# Patient Record
Sex: FEMALE | Race: WHITE | HISPANIC OR LATINO | Employment: FULL TIME | URBAN - METROPOLITAN AREA
[De-identification: names, ages, dates, MRNs, and addresses within clinical notes are randomized per-mention and may not be internally consistent; named-entity substitution may affect disease eponyms.]

---

## 2019-06-07 ENCOUNTER — HOSPITAL ENCOUNTER (INPATIENT)
Facility: HOSPITAL | Age: 24
LOS: 4 days | Discharge: HOME | End: 2019-06-12
Attending: EMERGENCY MEDICINE | Admitting: INTERNAL MEDICINE

## 2019-06-07 DIAGNOSIS — J45.902 SEVERE ASTHMA WITH STATUS ASTHMATICUS, UNSPECIFIED WHETHER PERSISTENT: Primary | ICD-10-CM

## 2019-06-07 DIAGNOSIS — J96.01 ACUTE RESPIRATORY FAILURE WITH HYPOXIA (HCC): ICD-10-CM

## 2019-06-07 DIAGNOSIS — R09.02 HYPOXIA: ICD-10-CM

## 2019-06-07 PROCEDURE — 99285 EMERGENCY DEPT VISIT HI MDM: CPT

## 2019-06-07 RX ORDER — SODIUM CHLORIDE 0.9 % (FLUSH) 0.9 %
10 SYRINGE (ML) INJECTION AS NEEDED
Status: DISCONTINUED | OUTPATIENT
Start: 2019-06-07 | End: 2019-06-12 | Stop reason: HOSPADM

## 2019-06-08 ENCOUNTER — APPOINTMENT (OUTPATIENT)
Dept: GENERAL RADIOLOGY | Facility: HOSPITAL | Age: 24
End: 2019-06-08

## 2019-06-08 PROBLEM — J45.902 SEVERE ASTHMA WITH STATUS ASTHMATICUS: Status: ACTIVE | Noted: 2019-06-08

## 2019-06-08 PROBLEM — Z91.09 ENVIRONMENTAL ALLERGIES: Status: ACTIVE | Noted: 2019-06-08

## 2019-06-08 PROBLEM — J96.01 ACUTE RESPIRATORY FAILURE WITH HYPOXIA: Status: ACTIVE | Noted: 2019-06-08

## 2019-06-08 PROBLEM — J45.901 ASTHMA EXACERBATION: Status: ACTIVE | Noted: 2019-06-08

## 2019-06-08 LAB
ALBUMIN SERPL-MCNC: 4.4 G/DL (ref 3.5–5.2)
ALBUMIN/GLOB SERPL: 1.5 G/DL
ALP SERPL-CCNC: 59 U/L (ref 39–117)
ALT SERPL W P-5'-P-CCNC: 35 U/L (ref 1–33)
ANION GAP SERPL CALCULATED.3IONS-SCNC: 13 MMOL/L
ANION GAP SERPL CALCULATED.3IONS-SCNC: 14 MMOL/L
ARTERIAL PATENCY WRIST A: POSITIVE
AST SERPL-CCNC: 35 U/L (ref 1–32)
ATMOSPHERIC PRESS: ABNORMAL MMHG
BASE EXCESS BLDA CALC-SCNC: -0.2 MMOL/L (ref 0–2)
BASOPHILS # BLD AUTO: 0.09 10*3/MM3 (ref 0–0.2)
BASOPHILS NFR BLD AUTO: 1.1 % (ref 0–1.5)
BDY SITE: ABNORMAL
BILIRUB SERPL-MCNC: 0.7 MG/DL (ref 0.2–1.2)
BODY TEMPERATURE: 37 C
BUN BLD-MCNC: 6 MG/DL (ref 6–20)
BUN BLD-MCNC: 8 MG/DL (ref 6–20)
BUN/CREAT SERPL: 10.8 (ref 7–25)
BUN/CREAT SERPL: 9.1 (ref 7–25)
CALCIUM SPEC-SCNC: 8.7 MG/DL (ref 8.6–10.5)
CALCIUM SPEC-SCNC: 9.2 MG/DL (ref 8.6–10.5)
CHLORIDE SERPL-SCNC: 104 MMOL/L (ref 98–107)
CHLORIDE SERPL-SCNC: 104 MMOL/L (ref 98–107)
CO2 BLDA-SCNC: 23.7 MMOL/L (ref 23–27)
CO2 SERPL-SCNC: 22 MMOL/L (ref 22–29)
CO2 SERPL-SCNC: 24 MMOL/L (ref 22–29)
COHGB MFR BLD: 1.2 % (ref 0–2)
CREAT BLD-MCNC: 0.66 MG/DL (ref 0.57–1)
CREAT BLD-MCNC: 0.74 MG/DL (ref 0.57–1)
D DIMER PPP FEU-MCNC: 0.55 MCGFEU/ML (ref 0–0.56)
D-LACTATE SERPL-SCNC: 1.1 MMOL/L (ref 0.5–2)
DEPRECATED RDW RBC AUTO: 39.4 FL (ref 37–54)
DEPRECATED RDW RBC AUTO: 40.8 FL (ref 37–54)
EOSINOPHIL # BLD AUTO: 0.29 10*3/MM3 (ref 0–0.4)
EOSINOPHIL NFR BLD AUTO: 3.6 % (ref 0.3–6.2)
ERYTHROCYTE [DISTWIDTH] IN BLOOD BY AUTOMATED COUNT: 15.1 % (ref 12.3–15.4)
ERYTHROCYTE [DISTWIDTH] IN BLOOD BY AUTOMATED COUNT: 15.3 % (ref 12.3–15.4)
GFR SERPL CREATININE-BSD FRML MDRD: 111 ML/MIN/1.73
GFR SERPL CREATININE-BSD FRML MDRD: 97 ML/MIN/1.73
GLOBULIN UR ELPH-MCNC: 3 GM/DL
GLUCOSE BLD-MCNC: 208 MG/DL (ref 65–99)
GLUCOSE BLD-MCNC: 94 MG/DL (ref 65–99)
HCO3 BLDA-SCNC: 22.8 MMOL/L (ref 20–26)
HCT VFR BLD AUTO: 45.2 % (ref 34–46.6)
HCT VFR BLD AUTO: 46.8 % (ref 34–46.6)
HCT VFR BLD CALC: 43.1 %
HGB BLD-MCNC: 13.6 G/DL (ref 12–15.9)
HGB BLD-MCNC: 14.2 G/DL (ref 12–15.9)
HGB BLDA-MCNC: 14.1 G/DL (ref 14–18)
HOLD SPECIMEN: NORMAL
HOLD SPECIMEN: NORMAL
HOROWITZ INDEX BLD+IHG-RTO: 28 %
IMM GRANULOCYTES # BLD AUTO: 0.03 10*3/MM3 (ref 0–0.05)
IMM GRANULOCYTES NFR BLD AUTO: 0.4 % (ref 0–0.5)
LYMPHOCYTES # BLD AUTO: 1.62 10*3/MM3 (ref 0.7–3.1)
LYMPHOCYTES NFR BLD AUTO: 20 % (ref 19.6–45.3)
MCH RBC QN AUTO: 22.7 PG (ref 26.6–33)
MCH RBC QN AUTO: 22.8 PG (ref 26.6–33)
MCHC RBC AUTO-ENTMCNC: 30.1 G/DL (ref 31.5–35.7)
MCHC RBC AUTO-ENTMCNC: 30.3 G/DL (ref 31.5–35.7)
MCV RBC AUTO: 75 FL (ref 79–97)
MCV RBC AUTO: 75.5 FL (ref 79–97)
METHGB BLD QL: 0.8 % (ref 0–1.5)
MODALITY: ABNORMAL
MONOCYTES # BLD AUTO: 0.91 10*3/MM3 (ref 0.1–0.9)
MONOCYTES NFR BLD AUTO: 11.2 % (ref 5–12)
NEUTROPHILS # BLD AUTO: 5.17 10*3/MM3 (ref 1.7–7)
NEUTROPHILS NFR BLD AUTO: 63.7 % (ref 42.7–76)
NOTE: ABNORMAL
NRBC BLD AUTO-RTO: 0 /100 WBC (ref 0–0.2)
OXYHGB MFR BLDV: 94.4 % (ref 94–99)
PCO2 BLDA: 31.6 MM HG (ref 35–45)
PCO2 TEMP ADJ BLD: 31.6 MM HG (ref 35–45)
PH BLDA: 7.46 PH UNITS (ref 7.35–7.45)
PH, TEMP CORRECTED: 7.46 PH UNITS
PLATELET # BLD AUTO: 311 10*3/MM3 (ref 140–450)
PLATELET # BLD AUTO: 316 10*3/MM3 (ref 140–450)
PMV BLD AUTO: 11.2 FL (ref 6–12)
PMV BLD AUTO: 11.3 FL (ref 6–12)
PO2 BLDA: 70.7 MM HG (ref 83–108)
PO2 TEMP ADJ BLD: 70.7 MM HG (ref 83–108)
POTASSIUM BLD-SCNC: 3.8 MMOL/L (ref 3.5–5.2)
POTASSIUM BLD-SCNC: 4.5 MMOL/L (ref 3.5–5.2)
PROT SERPL-MCNC: 7.4 G/DL (ref 6–8.5)
RBC # BLD AUTO: 5.99 10*6/MM3 (ref 3.77–5.28)
RBC # BLD AUTO: 6.24 10*6/MM3 (ref 3.77–5.28)
SODIUM BLD-SCNC: 140 MMOL/L (ref 136–145)
SODIUM BLD-SCNC: 141 MMOL/L (ref 136–145)
VENTILATOR MODE: ABNORMAL
WBC NRBC COR # BLD: 6.72 10*3/MM3 (ref 3.4–10.8)
WBC NRBC COR # BLD: 8.11 10*3/MM3 (ref 3.4–10.8)
WHOLE BLOOD HOLD SPECIMEN: NORMAL
WHOLE BLOOD HOLD SPECIMEN: NORMAL

## 2019-06-08 PROCEDURE — 87150 DNA/RNA AMPLIFIED PROBE: CPT | Performed by: EMERGENCY MEDICINE

## 2019-06-08 PROCEDURE — 87147 CULTURE TYPE IMMUNOLOGIC: CPT | Performed by: EMERGENCY MEDICINE

## 2019-06-08 PROCEDURE — 94799 UNLISTED PULMONARY SVC/PX: CPT

## 2019-06-08 PROCEDURE — 25010000002 METHYLPREDNISOLONE PER 125 MG: Performed by: EMERGENCY MEDICINE

## 2019-06-08 PROCEDURE — 25010000002 CEFTRIAXONE PER 250 MG: Performed by: EMERGENCY MEDICINE

## 2019-06-08 PROCEDURE — 85025 COMPLETE CBC W/AUTO DIFF WBC: CPT | Performed by: EMERGENCY MEDICINE

## 2019-06-08 PROCEDURE — 36600 WITHDRAWAL OF ARTERIAL BLOOD: CPT

## 2019-06-08 PROCEDURE — 82805 BLOOD GASES W/O2 SATURATION: CPT

## 2019-06-08 PROCEDURE — 93005 ELECTROCARDIOGRAM TRACING: CPT | Performed by: EMERGENCY MEDICINE

## 2019-06-08 PROCEDURE — 85379 FIBRIN DEGRADATION QUANT: CPT | Performed by: EMERGENCY MEDICINE

## 2019-06-08 PROCEDURE — 25010000002 HEPARIN (PORCINE) PER 1000 UNITS: Performed by: PHYSICIAN ASSISTANT

## 2019-06-08 PROCEDURE — 87040 BLOOD CULTURE FOR BACTERIA: CPT | Performed by: EMERGENCY MEDICINE

## 2019-06-08 PROCEDURE — 25010000002 METHYLPREDNISOLONE PER 125 MG: Performed by: PHYSICIAN ASSISTANT

## 2019-06-08 PROCEDURE — 83605 ASSAY OF LACTIC ACID: CPT | Performed by: EMERGENCY MEDICINE

## 2019-06-08 PROCEDURE — 85027 COMPLETE CBC AUTOMATED: CPT | Performed by: PHYSICIAN ASSISTANT

## 2019-06-08 PROCEDURE — 94640 AIRWAY INHALATION TREATMENT: CPT

## 2019-06-08 PROCEDURE — 99223 1ST HOSP IP/OBS HIGH 75: CPT | Performed by: INTERNAL MEDICINE

## 2019-06-08 PROCEDURE — 80053 COMPREHEN METABOLIC PANEL: CPT | Performed by: EMERGENCY MEDICINE

## 2019-06-08 PROCEDURE — 25010000002 ONDANSETRON PER 1 MG

## 2019-06-08 PROCEDURE — 71045 X-RAY EXAM CHEST 1 VIEW: CPT

## 2019-06-08 PROCEDURE — 25010000002 AZITHROMYCIN PER 500 MG: Performed by: EMERGENCY MEDICINE

## 2019-06-08 RX ORDER — MONTELUKAST SODIUM 10 MG/1
10 TABLET ORAL NIGHTLY
COMMUNITY
End: 2021-05-24 | Stop reason: SDUPTHER

## 2019-06-08 RX ORDER — HEPARIN SODIUM 5000 [USP'U]/ML
5000 INJECTION, SOLUTION INTRAVENOUS; SUBCUTANEOUS EVERY 8 HOURS SCHEDULED
Status: DISCONTINUED | OUTPATIENT
Start: 2019-06-08 | End: 2019-06-12 | Stop reason: HOSPADM

## 2019-06-08 RX ORDER — METHYLPREDNISOLONE SODIUM SUCCINATE 125 MG/2ML
125 INJECTION, POWDER, LYOPHILIZED, FOR SOLUTION INTRAMUSCULAR; INTRAVENOUS ONCE
Status: COMPLETED | OUTPATIENT
Start: 2019-06-08 | End: 2019-06-08

## 2019-06-08 RX ORDER — ONDANSETRON 2 MG/ML
INJECTION INTRAMUSCULAR; INTRAVENOUS
Status: COMPLETED
Start: 2019-06-08 | End: 2019-06-08

## 2019-06-08 RX ORDER — METHYLPREDNISOLONE SODIUM SUCCINATE 125 MG/2ML
60 INJECTION, POWDER, LYOPHILIZED, FOR SOLUTION INTRAMUSCULAR; INTRAVENOUS EVERY 8 HOURS
Status: DISCONTINUED | OUTPATIENT
Start: 2019-06-08 | End: 2019-06-10

## 2019-06-08 RX ORDER — IPRATROPIUM BROMIDE AND ALBUTEROL SULFATE 2.5; .5 MG/3ML; MG/3ML
3 SOLUTION RESPIRATORY (INHALATION)
Status: DISCONTINUED | OUTPATIENT
Start: 2019-06-08 | End: 2019-06-12 | Stop reason: HOSPADM

## 2019-06-08 RX ORDER — IPRATROPIUM BROMIDE AND ALBUTEROL SULFATE 2.5; .5 MG/3ML; MG/3ML
3 SOLUTION RESPIRATORY (INHALATION) ONCE
Status: COMPLETED | OUTPATIENT
Start: 2019-06-08 | End: 2019-06-08

## 2019-06-08 RX ORDER — CEFTRIAXONE SODIUM 1 G/50ML
1 INJECTION, SOLUTION INTRAVENOUS ONCE
Status: DISCONTINUED | OUTPATIENT
Start: 2019-06-08 | End: 2019-06-08 | Stop reason: SDUPTHER

## 2019-06-08 RX ORDER — ALBUTEROL SULFATE 2.5 MG/3ML
2.5 SOLUTION RESPIRATORY (INHALATION) EVERY 4 HOURS PRN
COMMUNITY
End: 2019-06-12 | Stop reason: HOSPADM

## 2019-06-08 RX ORDER — CHOLECALCIFEROL (VITAMIN D3) 125 MCG
5 CAPSULE ORAL NIGHTLY PRN
Status: DISCONTINUED | OUTPATIENT
Start: 2019-06-08 | End: 2019-06-12 | Stop reason: HOSPADM

## 2019-06-08 RX ORDER — ONDANSETRON 2 MG/ML
4 INJECTION INTRAMUSCULAR; INTRAVENOUS ONCE
Status: COMPLETED | OUTPATIENT
Start: 2019-06-08 | End: 2019-06-08

## 2019-06-08 RX ORDER — SODIUM CHLORIDE 0.9 % (FLUSH) 0.9 %
3-10 SYRINGE (ML) INJECTION AS NEEDED
Status: DISCONTINUED | OUTPATIENT
Start: 2019-06-08 | End: 2019-06-12 | Stop reason: HOSPADM

## 2019-06-08 RX ORDER — BUDESONIDE AND FORMOTEROL FUMARATE DIHYDRATE 160; 4.5 UG/1; UG/1
2 AEROSOL RESPIRATORY (INHALATION)
Status: DISCONTINUED | OUTPATIENT
Start: 2019-06-08 | End: 2019-06-12 | Stop reason: HOSPADM

## 2019-06-08 RX ORDER — BENZONATATE 100 MG/1
200 CAPSULE ORAL 3 TIMES DAILY PRN
Status: DISCONTINUED | OUTPATIENT
Start: 2019-06-08 | End: 2019-06-12 | Stop reason: HOSPADM

## 2019-06-08 RX ORDER — ACETAMINOPHEN 325 MG/1
650 TABLET ORAL EVERY 4 HOURS PRN
Status: DISCONTINUED | OUTPATIENT
Start: 2019-06-08 | End: 2019-06-12 | Stop reason: HOSPADM

## 2019-06-08 RX ORDER — SODIUM CHLORIDE 0.9 % (FLUSH) 0.9 %
3 SYRINGE (ML) INJECTION EVERY 12 HOURS SCHEDULED
Status: DISCONTINUED | OUTPATIENT
Start: 2019-06-08 | End: 2019-06-12 | Stop reason: HOSPADM

## 2019-06-08 RX ORDER — BENZONATATE 200 MG/1
200 CAPSULE ORAL 3 TIMES DAILY PRN
COMMUNITY
End: 2019-06-18

## 2019-06-08 RX ORDER — MONTELUKAST SODIUM 10 MG/1
10 TABLET ORAL NIGHTLY
Status: DISCONTINUED | OUTPATIENT
Start: 2019-06-08 | End: 2019-06-12 | Stop reason: HOSPADM

## 2019-06-08 RX ADMIN — METHYLPREDNISOLONE SODIUM SUCCINATE 125 MG: 125 INJECTION, POWDER, FOR SOLUTION INTRAMUSCULAR; INTRAVENOUS at 00:09

## 2019-06-08 RX ADMIN — BUDESONIDE AND FORMOTEROL FUMARATE DIHYDRATE 2 PUFF: 160; 4.5 AEROSOL RESPIRATORY (INHALATION) at 07:36

## 2019-06-08 RX ADMIN — SODIUM CHLORIDE, PRESERVATIVE FREE 3 ML: 5 INJECTION INTRAVENOUS at 19:34

## 2019-06-08 RX ADMIN — MONTELUKAST SODIUM 10 MG: 10 TABLET, COATED ORAL at 19:33

## 2019-06-08 RX ADMIN — CEFTRIAXONE SODIUM 1 G: 1 INJECTION, SOLUTION INTRAVENOUS at 03:10

## 2019-06-08 RX ADMIN — IPRATROPIUM BROMIDE AND ALBUTEROL SULFATE 3 ML: 2.5; .5 SOLUTION RESPIRATORY (INHALATION) at 01:57

## 2019-06-08 RX ADMIN — IPRATROPIUM BROMIDE AND ALBUTEROL SULFATE 3 ML: 2.5; .5 SOLUTION RESPIRATORY (INHALATION) at 07:05

## 2019-06-08 RX ADMIN — ONDANSETRON 4 MG: 2 INJECTION INTRAMUSCULAR; INTRAVENOUS at 02:28

## 2019-06-08 RX ADMIN — IPRATROPIUM BROMIDE AND ALBUTEROL SULFATE 3 ML: 2.5; .5 SOLUTION RESPIRATORY (INHALATION) at 00:25

## 2019-06-08 RX ADMIN — AZITHROMYCIN MONOHYDRATE 500 MG: 500 INJECTION, POWDER, LYOPHILIZED, FOR SOLUTION INTRAVENOUS at 02:09

## 2019-06-08 RX ADMIN — IPRATROPIUM BROMIDE AND ALBUTEROL SULFATE 3 ML: 2.5; .5 SOLUTION RESPIRATORY (INHALATION) at 19:05

## 2019-06-08 RX ADMIN — BUDESONIDE AND FORMOTEROL FUMARATE DIHYDRATE 2 PUFF: 160; 4.5 AEROSOL RESPIRATORY (INHALATION) at 19:05

## 2019-06-08 RX ADMIN — ACETAMINOPHEN 650 MG: 325 TABLET ORAL at 19:33

## 2019-06-08 RX ADMIN — METHYLPREDNISOLONE SODIUM SUCCINATE 60 MG: 125 INJECTION, POWDER, FOR SOLUTION INTRAMUSCULAR; INTRAVENOUS at 16:15

## 2019-06-08 RX ADMIN — METHYLPREDNISOLONE SODIUM SUCCINATE 60 MG: 125 INJECTION, POWDER, FOR SOLUTION INTRAMUSCULAR; INTRAVENOUS at 08:10

## 2019-06-08 RX ADMIN — BENZONATATE 200 MG: 100 CAPSULE ORAL at 05:06

## 2019-06-08 RX ADMIN — HEPARIN SODIUM 5000 UNITS: 5000 INJECTION INTRAVENOUS; SUBCUTANEOUS at 14:49

## 2019-06-08 RX ADMIN — IPRATROPIUM BROMIDE AND ALBUTEROL SULFATE 3 ML: 2.5; .5 SOLUTION RESPIRATORY (INHALATION) at 13:48

## 2019-06-08 RX ADMIN — ACETAMINOPHEN 650 MG: 325 TABLET ORAL at 08:10

## 2019-06-08 RX ADMIN — HEPARIN SODIUM 5000 UNITS: 5000 INJECTION INTRAVENOUS; SUBCUTANEOUS at 21:34

## 2019-06-08 RX ADMIN — HEPARIN SODIUM 5000 UNITS: 5000 INJECTION INTRAVENOUS; SUBCUTANEOUS at 05:07

## 2019-06-08 NOTE — PROGRESS NOTES
Patient seen and examined at the bedside.  Patient resting in bed in no acute distress but still has very vigorous cough and has pleuritic chest pain because of the cough.  Overall she feels better compared to when she came in.  No fever or chills.  No nausea, vomiting, diarrhea, abdominal pain.  -We will continue neb treatment, oxygen treatment, steroids  -Medicine will follow.

## 2019-06-08 NOTE — H&P
CARIE/ Medicine History and Physical    Primary Care Physician: Provider, No Known    CHIEF COMPLAIN :    Dyspnea.    History of Present Illness  24-year-old obese female presented to ED with a chief complaint of dyspnea/cough-going on for past 3 weeks.  Approximately 2 weeks back patient went to [facility]- was treated for asthma exacerbation with steroids/antibiotics- in spite of that she continued to have symptoms, 2 days back she went to [facility]-better she tested positive for rhinovirus/parainfluenza virus, as per patient CT PE was done-which was negative.  Again she was diagnosed with asthma exacerbation, was treated symptomatically and sent home.  In spite of continued use of her nebulizer, cough syrup, Dulera- she had worsening of dyspnea now at rest, associated with cough/clear sputum and chest pain whenever she tries to take deep breath sharp in nature.  No fever, but felt warm.  Did complain of nausea but no vomiting.  Patient does work in the hospital as .  States over last 3 years has poorly controlled asthma-last admission was in 2018 at  for 2 weeks-never intubated.  Asthma has been managed by primary care.    Review of Systems   Complete ROS done, which is negative except as above and HPI.  Old records reviewed and summarized in PM hx         Past Medical History:   Diagnosis Date   • Asthma-since childhood        Past Surgical History:   Procedure Laterality Date   • CHOLECYSTECTOMY         Family History:   Reviewed with patient-no family history of CAD.    Social History:    reports that she has never smoked. She has never used smokeless tobacco. She reports that she does not drink alcohol or use drugs.    Medications:    (Not in a hospital admission)  No Known Allergies      PHYSICAL EXAM :    Vitals:    06/08/19 0200   BP: 127/74   Pulse: 96   Resp:  22   Temp:  Afebrile   SpO2: 98% on 2 L nasal cannula.     VITALS-   AS ABOVE.  GENERAL- Not distressed, well nourished,  obese.  RS-decreased air entry at the bases, could not take a deep breath will start coughing.  CVS- s1s2 Regular, tachycardic, no murmur.  ABD- soft, non tender, distended, no organomegaly. BS good.  EXT- no edema. Pulses + .  NEURO- AAO-3, power 5/5 in all ext, no gross sensory deficit, cranial nerves intact.  EYES- Conjunctivae are normal. Pupils are equal, round, and reactive to light. No scleral icterus.   ENT- no external ear nose lesions, mucosa moist.  NECK-  No tracheal deviation present. No thyromegaly present,No cervical lymphadenopathy.  JOINTS/MSK- no deformity, no swelling.  SKIN- no rash , warm to touch.  PSYCHIATRIC-  has a normal mood and affect.Thought content normal.           RESULTS REVIEWED :   Data.    Results from last 7 days   Lab Units 06/08/19  0005   WBC 10*3/mm3 8.11   HEMOGLOBIN g/dL 14.2   HEMATOCRIT % 46.8*   PLATELETS 10*3/mm3 316     Results from last 7 days   Lab Units 06/08/19  0006   SODIUM mmol/L 140   POTASSIUM mmol/L 3.8   CHLORIDE mmol/L 104   CO2 mmol/L 22.0   BUN mg/dL 6   CREATININE mg/dL 0.66   GLUCOSE mg/dL 94   CALCIUM mg/dL 9.2   ALT (SGPT) U/L 35*   AST (SGOT) U/L 35*       Brief Urine Lab Results     None          pH, Arterial   Date Value Ref Range Status   06/08/2019 7.465 (H) 7.350 - 7.450 pH units Final     Comment:     83 Value above reference range     ABG-7.46/PCO2 of 31/PO2 of 70% on 2 L FiO2.  Ekg-sinus rhythm at 106 bpm, QTC of 456, Q waves in lead III with T wave inversion.  Cx-?  Obscuring of right heart border atelectasis versus pneumonia  WBC- 8, hemoglobin of 14, neutrophils of 63, platelet of 316.  D-dimer of 0.55, lactic acid of 1.1, BUN/creatinine 6/0.6.  I have personally reviewed current lab, radiology, and ekg .      Active Hospital Problems    Diagnosis POA   • **Acute respiratory failure with hypoxia (CMS/HCC) [J96.01] Yes   • Asthma exacerbation [J45.901] Yes   • Environmental allergies [Z91.09] Yes         Assessment / Plan     Asthma  exacerbation with hypoxic respiratory failure  -Continued albuterol nebulizer every 6 hourly, IV Solu-Medrol, for now we will continue Rocephin/Zithromax probably can taper the antibiotics soon.  - Peak expiratory flow measurements.  -May need pulmonary outpatient appointment upon discharge.  -We will get records from recent [facility] work-up.    DVT PXL  -michael's/scds  -lovenox-not initiated.      I discussed the patients findings and my recommendations with: patient/family.    I believe this patient meets INPATIENT status due to the need for care which can only be reasonably provided in an hospital setting such as aggressive/expedited ancillary services and/or consultation services, the necessity for IV medications, close physician monitoring and/or the possible need for procedures.  In such, I feel patient’s risk for adverse outcomes and need for care warrant INPATIENT evaluation and predict the patient’s care encounter to likely last beyond 2 midnights.        Denzel Riddle MD  06/08/19  2:08 AM

## 2019-06-08 NOTE — ED PROVIDER NOTES
Subjective   24-year-old female presents for evaluation of asthma exacerbation.  Of note, the patient has a long history of asthma and states that she has had to be hospitalized multiple times in the past.  She has never been intubated.  She states that she has not felt well for the past 3 weeks.  Over that span, she has been to both urgent care and an outside emergency department for her symptoms.  She has completed 2 outpatient courses of oral steroids.  She was diagnosed with rhinovirus by PCR last week.  However, she has continued to be symptomatic and states that over the past 24 hours she has had significantly increased dyspnea when compared to baseline despite using her nebs as directed.  She endorses a nonproductive cough as well as persistent wheezing.  She is unsure as to what may have triggered her symptoms.  No sick contacts.        History provided by:  Patient  Shortness of Breath   Severity:  Severe  Onset quality:  Gradual  Duration:  1 day  Timing:  Constant  Progression:  Worsening  Chronicity:  Recurrent  Relieved by:  Nothing  Worsened by:  Nothing  Ineffective treatments: Steroids.  Associated symptoms: cough and wheezing    Associated symptoms: no fever        Review of Systems   Constitutional: Negative for fever.   Respiratory: Positive for cough, shortness of breath and wheezing.    All other systems reviewed and are negative.      No past medical history on file.    No Known Allergies    No past surgical history on file.    No family history on file.    Social History     Socioeconomic History   • Marital status: Single     Spouse name: Not on file   • Number of children: Not on file   • Years of education: Not on file   • Highest education level: Not on file         Objective   Physical Exam   Constitutional: She is oriented to person, place, and time. She appears well-developed and well-nourished. No distress.   Nontoxic-appearing female, labored breathing   HENT:   Head: Normocephalic  and atraumatic.   Mouth/Throat: Oropharynx is clear and moist.   Neck: Neck supple. No JVD present.   Cardiovascular: Regular rhythm and normal heart sounds. Exam reveals no gallop and no friction rub.   No murmur heard.  Tachycardic   Pulmonary/Chest: She is in respiratory distress. She has wheezes. She has no rales.   Labored breathing, poor air movement noted bilaterally, expiratory wheezing noted bilaterally, speaking in short sentences, no retractions   Abdominal: Soft. Bowel sounds are normal. She exhibits no distension and no mass. There is no tenderness. There is no rebound and no guarding.   Musculoskeletal: Normal range of motion.   Neurological: She is alert and oriented to person, place, and time.   Skin: Skin is warm and dry. No rash noted. She is not diaphoretic. No erythema.   Psychiatric: She has a normal mood and affect. Judgment and thought content normal.   Nursing note and vitals reviewed.      Critical Care  Performed by: Jose G Roberson MD  Authorized by: Jose G Roberson MD     Critical care provider statement:     Critical care time (minutes):  35    Critical care was necessary to treat or prevent imminent or life-threatening deterioration of the following conditions:  Respiratory failure    Critical care was time spent personally by me on the following activities:  Development of treatment plan with patient or surrogate, evaluation of patient's response to treatment, examination of patient, obtaining history from patient or surrogate, ordering and performing treatments and interventions, ordering and review of laboratory studies, ordering and review of radiographic studies, pulse oximetry and re-evaluation of patient's condition               ED Course  ED Course as of Jun 08 0224   Sat Jun 08, 2019   0128 D-Dimer, Quant: 0.55 [DD]   0222 24-year-old female presents for evaluation of asthma exacerbation.  Of note, the patient states that she has been hospitalized for asthma multiple  "times in the past but has never been intubated.  On arrival to the ED, patient with labored breathing and room air oxygen saturations of 90%.  Supplemental oxygen given.  Nebs and steroids given.  Exam remarkable for poor air movement bilaterally, audible wheezing, and moderate respiratory distress.  Chest x-ray somewhat equivocal but suggestive of potential pneumonia which does fit the patient's clinical picture.  Rocephin and azithromycin given.  Blood cultures pending.  Low risk Wells and d-dimer negative.  Following multiple nebs, patient with persistently labored breathing and oxygen saturations in the upper 80s/low 90s.  At this point, I feel that she warrants admission to the hospital for further evaluation and treatment.  I discussed her case with Dr. Riddle, and she will be admitted under her care.    [DD]      ED Course User Index  [DD] Jose G Roberson MD      No results found for this or any previous visit (from the past 24 hour(s)).  Note: In addition to lab results from this visit, the labs listed above may include labs taken at another facility or during a different encounter within the last 24 hours. Please correlate lab times with ED admission and discharge times for further clarification of the services performed during this visit.    XR Chest 1 View    (Results Pending)     Vitals:    06/07/19 2349   BP: 135/71   BP Location: Left arm   Patient Position: Sitting   Pulse: 120   Resp: 26   Temp: 98.8 °F (37.1 °C)   TempSrc: Oral   SpO2: 90%   Weight: 111 kg (245 lb)   Height: 152.4 cm (60\")     Medications   sodium chloride 0.9 % flush 10 mL (not administered)   ipratropium-albuterol (DUO-NEB) nebulizer solution 3 mL (not administered)   methylPREDNISolone sodium succinate (SOLU-Medrol) injection 125 mg (not administered)     ECG/EMG Results (last 24 hours)     ** No results found for the last 24 hours. **        No orders to display                     Recent Results (from the past 24 hour(s)) "   Lactic Acid, Plasma    Collection Time: 06/08/19 12:05 AM   Result Value Ref Range    Lactate 1.1 0.5 - 2.0 mmol/L   CBC Auto Differential    Collection Time: 06/08/19 12:05 AM   Result Value Ref Range    WBC 8.11 3.40 - 10.80 10*3/mm3    RBC 6.24 (H) 3.77 - 5.28 10*6/mm3    Hemoglobin 14.2 12.0 - 15.9 g/dL    Hematocrit 46.8 (H) 34.0 - 46.6 %    MCV 75.0 (L) 79.0 - 97.0 fL    MCH 22.8 (L) 26.6 - 33.0 pg    MCHC 30.3 (L) 31.5 - 35.7 g/dL    RDW 15.1 12.3 - 15.4 %    RDW-SD 39.4 37.0 - 54.0 fl    MPV 11.2 6.0 - 12.0 fL    Platelets 316 140 - 450 10*3/mm3    Neutrophil % 63.7 42.7 - 76.0 %    Lymphocyte % 20.0 19.6 - 45.3 %    Monocyte % 11.2 5.0 - 12.0 %    Eosinophil % 3.6 0.3 - 6.2 %    Basophil % 1.1 0.0 - 1.5 %    Immature Grans % 0.4 0.0 - 0.5 %    Neutrophils, Absolute 5.17 1.70 - 7.00 10*3/mm3    Lymphocytes, Absolute 1.62 0.70 - 3.10 10*3/mm3    Monocytes, Absolute 0.91 (H) 0.10 - 0.90 10*3/mm3    Eosinophils, Absolute 0.29 0.00 - 0.40 10*3/mm3    Basophils, Absolute 0.09 0.00 - 0.20 10*3/mm3    Immature Grans, Absolute 0.03 0.00 - 0.05 10*3/mm3    nRBC 0.0 0.0 - 0.2 /100 WBC   Lavender Top    Collection Time: 06/08/19 12:05 AM   Result Value Ref Range    Extra Tube hold for add-on    Comprehensive Metabolic Panel    Collection Time: 06/08/19 12:06 AM   Result Value Ref Range    Glucose 94 65 - 99 mg/dL    BUN 6 6 - 20 mg/dL    Creatinine 0.66 0.57 - 1.00 mg/dL    Sodium 140 136 - 145 mmol/L    Potassium 3.8 3.5 - 5.2 mmol/L    Chloride 104 98 - 107 mmol/L    CO2 22.0 22.0 - 29.0 mmol/L    Calcium 9.2 8.6 - 10.5 mg/dL    Total Protein 7.4 6.0 - 8.5 g/dL    Albumin 4.40 3.50 - 5.20 g/dL    ALT (SGPT) 35 (H) 1 - 33 U/L    AST (SGOT) 35 (H) 1 - 32 U/L    Alkaline Phosphatase 59 39 - 117 U/L    Total Bilirubin 0.7 0.2 - 1.2 mg/dL    eGFR Non African Amer 111 >60 mL/min/1.73    Globulin 3.0 gm/dL    A/G Ratio 1.5 g/dL    BUN/Creatinine Ratio 9.1 7.0 - 25.0    Anion Gap 14.0 mmol/L   Light Blue Top    Collection  Time: 06/08/19 12:06 AM   Result Value Ref Range    Extra Tube hold for add-on    Green Top (Gel)    Collection Time: 06/08/19 12:06 AM   Result Value Ref Range    Extra Tube Hold for add-ons.    Gold Top - SST    Collection Time: 06/08/19 12:06 AM   Result Value Ref Range    Extra Tube Hold for add-ons.    D-dimer, Quantitative    Collection Time: 06/08/19 12:06 AM   Result Value Ref Range    D-Dimer, Quantitative 0.55 0.00 - 0.56 MCGFEU/mL   Blood Gas, Arterial With Co-Ox    Collection Time: 06/08/19  1:56 AM   Result Value Ref Range    Site Right Radial     Ismael's Test Positive     pH, Arterial 7.465 (H) 7.350 - 7.450 pH units    pCO2, Arterial 31.6 (L) 35.0 - 45.0 mm Hg    pO2, Arterial 70.7 (L) 83.0 - 108.0 mm Hg    HCO3, Arterial 22.8 20.0 - 26.0 mmol/L    Base Excess, Arterial -0.2 (L) 0.0 - 2.0 mmol/L    Hemoglobin, Blood Gas 14.1 14 - 18 g/dL    Hematocrit, Blood Gas 43.1 %    Oxyhemoglobin 94.4 94 - 99 %    Methemoglobin 0.80 0.00 - 1.50 %    Carboxyhemoglobin 1.2 0 - 2 %    CO2 Content 23.7 23 - 27 mmol/L    Temperature 37.0 C    Barometric Pressure for Blood Gas  mmHg    Modality Nasal Cannula     FIO2 28 %    Ventilator Mode       Note      pH, Temp Corrected 7.465 pH Units    pCO2, Temperature Corrected 31.6 (L) 35 - 45 mm Hg    pO2, Temperature Corrected 70.7 (L) 83 - 108 mm Hg     Note: In addition to lab results from this visit, the labs listed above may include labs taken at another facility or during a different encounter within the last 24 hours. Please correlate lab times with ED admission and discharge times for further clarification of the services performed during this visit.    XR Chest 1 View   Final Result      1.  Opacity obscuring the right heart border most likely reflective of atelectasis in light of patient's clinical history though pneumonia not excluded.      Signer Name: Porfirio Maharaj MD    Signed: 6/8/2019 12:35 AM    Workstation Name: [id number]            Vitals:     06/08/19 0100 06/08/19 0130 06/08/19 0156 06/08/19 0200   BP: 131/82 132/58  127/74   Pulse: 110 98 88 96   Resp:       Temp:       TempSrc:       SpO2: 92% 94% 97% 98%   Weight:       Height:         Medications   sodium chloride 0.9 % flush 10 mL (not administered)   cefTRIAXone (ROCEPHIN) IVPB 1 g (not administered)   azithromycin 500 MG/250 ML 0.9% NS IVPB (MBP) (500 mg Intravenous New Bag 6/8/19 0209)   ipratropium-albuterol (DUO-NEB) nebulizer solution 3 mL (3 mL Nebulization Given 6/8/19 0025)   methylPREDNISolone sodium succinate (SOLU-Medrol) injection 125 mg (125 mg Intravenous Given 6/8/19 0009)   ipratropium-albuterol (DUO-NEB) nebulizer solution 3 mL (3 mL Nebulization Given 6/8/19 0157)     ECG/EMG Results (last 24 hours)     Procedure Component Value Units Date/Time    ECG 12 Lead [[id number]] Collected:  06/08/19 0036     Updated:  06/08/19 0034        ECG 12 Lead                 MDM    Final diagnoses:   Severe asthma with status asthmaticus, unspecified whether persistent   Hypoxia       Documentation assistance provided by rock Aguilar.  Information recorded by the scribe was done at my direction and has been verified and validated by me.     Edi Aguilar  06/08/19 0003       Jose G Roberson MD  06/08/19 0224

## 2019-06-08 NOTE — PLAN OF CARE
Problem: Patient Care Overview  Goal: Plan of Care Review  Outcome: Ongoing (interventions implemented as appropriate)   06/08/19 2996   Coping/Psychosocial   Plan of Care Reviewed With patient   Plan of Care Review   Progress improving   OTHER   Outcome Summary VSS, Abx dc'd, continuing to receive IV steroids.      Goal: Individualization and Mutuality  Outcome: Ongoing (interventions implemented as appropriate)    Goal: Discharge Needs Assessment  Outcome: Ongoing (interventions implemented as appropriate)    Goal: Interprofessional Rounds/Family Conf  Outcome: Ongoing (interventions implemented as appropriate)      Problem: ARDS (Acute Resp Distress Syndrome) (Adult)  Goal: Signs and Symptoms of Listed Potential Problems Will be Absent, Minimized or Managed (ARDS)  Outcome: Ongoing (interventions implemented as appropriate)

## 2019-06-08 NOTE — PLAN OF CARE
Problem: Patient Care Overview  Goal: Plan of Care Review  Outcome: Ongoing (interventions implemented as appropriate)   06/08/19 8925   Coping/Psychosocial   Plan of Care Reviewed With patient   Plan of Care Review   Progress improving   OTHER   Outcome Summary VSS. Pt recieving IV ABX, solu-medrol, and duo-nebs. On 2 L NC.

## 2019-06-08 NOTE — PAYOR COMM NOTE
"Yoli Regan (24 y.o. Female)  Initial notification and clinicals. Thanks. Corrine Severino, RN      Date of Birth Social Security Number Address Home Phone MRN    [id number]  [address] [address] [phone] [id number]    Bahai Marital Status          Sikh Single       Admission Date Admission Type Admitting Provider Attending Provider Department, Room/Bed    6/7/19 Emergency Denzel Riddle MD Kalantar, Masoud, MD [facility] 4G, S456/1    Discharge Date Discharge Disposition Discharge Destination                       Attending Provider:  Jason Pete MD    Allergies:  No Known Allergies    Isolation:  None   Infection:  None   Code Status:  CPR    Ht:  152.4 cm (60\")   Wt:  108 kg (238 lb)    Admission Cmt:  None   Principal Problem:  Acute respiratory failure with hypoxia (CMS/HCC) [J96.01]                 Active Insurance as of 6/7/2019     Primary Coverage     Payor Plan Insurance Group Employer/Plan Group    [employer] [place] [employer] [place]ICA[place]      Payor Plan Address Payor Plan Phone Number Payor Plan Fax Number Effective Dates    [address] [address] [phone]  6/7/2019 - None Entered    [facility] [id number]       Subscriber Name Subscriber Birth Date Member ID       YOLI REGAN 1995 [id number]                 Emergency Contacts      (Rel.) Home Phone Work Phone Mobile Phone    Rahat Regan (Father) [phone] -- --            Insurance Information                [facility]/WELLCARE MEDICAID Phone: [phone]    Subscriber: Yoli Regan Subscriber#: [id number]    Group#:  Precert#:           Problem List           Codes Noted - Resolved       Hospital    * (Principal) Acute respiratory failure with hypoxia (CMS/HCC) ICD-10-CM: J96.01  ICD-9-CM: 518.81 6/8/2019 - Present    Asthma exacerbation ICD-10-CM: J45.901  ICD-9-CM: 493.92 6/8/2019 - Present    Environmental allergies ICD-10-CM: Z91.09  ICD-9-CM: V15.09 6/8/2019 " - Present    Severe asthma with status asthmaticus ICD-10-CM: J45.902  ICD-9-CM: 493.91 6/8/2019 - Present             History & Physical      Denzel Riddle MD at 6/8/2019  2:08 AM          CARIE/ Medicine History and Physical    Primary Care Physician: Provider, No Known    CHIEF COMPLAIN :    Dyspnea.    History of Present Illness  24-year-old obese female presented to ED with a chief complaint of dyspnea/cough-going on for past 3 weeks.  Approximately 2 weeks back patient went to [facility]- was treated for asthma exacerbation with steroids/antibiotics- in spite of that she continued to have symptoms, 2 days back she went to [facility]-better she tested positive for rhinovirus/parainfluenza virus, as per patient CT PE was done-which was negative.  Again she was diagnosed with asthma exacerbation, was treated symptomatically and sent home.  In spite of continued use of her nebulizer, cough syrup, Dulera- she had worsening of dyspnea now at rest, associated with cough/clear sputum and chest pain whenever she tries to take deep breath sharp in nature.  No fever, but felt warm.  Did complain of nausea but no vomiting.  Patient does work in the hospital as .  States over last 3 years has poorly controlled asthma-last admission was in 2018 at  for 2 weeks-never intubated.  Asthma has been managed by primary care.    Review of Systems   Complete ROS done, which is negative except as above and HPI.  Old records reviewed and summarized in PM hx         Past Medical History:   Diagnosis Date   • Asthma-since childhood        Past Surgical History:   Procedure Laterality Date   • CHOLECYSTECTOMY         Family History:   Reviewed with patient-no family history of CAD.    Social History:    reports that she has never smoked. She has never used smokeless tobacco. She reports that she does not drink alcohol or use drugs.    Medications:    (Not in a hospital admission)  No Known Allergies      PHYSICAL EXAM  :    Vitals:    06/08/19 0200   BP: 127/74   Pulse: 96   Resp:  22   Temp:  Afebrile   SpO2: 98% on 2 L nasal cannula.     VITALS-   AS ABOVE.  GENERAL- Not distressed, well nourished, obese.  RS-decreased air entry at the bases, could not take a deep breath will start coughing.  CVS- s1s2 Regular, tachycardic, no murmur.  ABD- soft, non tender, distended, no organomegaly. BS good.  EXT- no edema. Pulses + .  NEURO- AAO-3, power 5/5 in all ext, no gross sensory deficit, cranial nerves intact.  EYES- Conjunctivae are normal. Pupils are equal, round, and reactive to light. No scleral icterus.   ENT- no external ear nose lesions, mucosa moist.  NECK-  No tracheal deviation present. No thyromegaly present,No cervical lymphadenopathy.  JOINTS/MSK- no deformity, no swelling.  SKIN- no rash , warm to touch.  PSYCHIATRIC-  has a normal mood and affect.Thought content normal.           RESULTS REVIEWED :   Data.    Results from last 7 days   Lab Units 06/08/19  0005   WBC 10*3/mm3 8.11   HEMOGLOBIN g/dL 14.2   HEMATOCRIT % 46.8*   PLATELETS 10*3/mm3 316     Results from last 7 days   Lab Units 06/08/19  0006   SODIUM mmol/L 140   POTASSIUM mmol/L 3.8   CHLORIDE mmol/L 104   CO2 mmol/L 22.0   BUN mg/dL 6   CREATININE mg/dL 0.66   GLUCOSE mg/dL 94   CALCIUM mg/dL 9.2   ALT (SGPT) U/L 35*   AST (SGOT) U/L 35*       Brief Urine Lab Results     None          pH, Arterial   Date Value Ref Range Status   06/08/2019 7.465 (H) 7.350 - 7.450 pH units Final     Comment:     83 Value above reference range     ABG-7.46/PCO2 of 31/PO2 of 70% on 2 L FiO2.  Ekg-sinus rhythm at 106 bpm, QTC of 456, Q waves in lead III with T wave inversion.  Cx-?  Obscuring of right heart border atelectasis versus pneumonia  WBC- 8, hemoglobin of 14, neutrophils of 63, platelet of 316.  D-dimer of 0.55, lactic acid of 1.1, BUN/creatinine 6/0.6.  I have personally reviewed current lab, radiology, and ekg .      Active Hospital Problems    Diagnosis POA   •  **Acute respiratory failure with hypoxia (CMS/[facility]) [J96.01] Yes   • Asthma exacerbation [J45.901] Yes   • Environmental allergies [Z91.09] Yes         Assessment / Plan     Asthma exacerbation with hypoxic respiratory failure  -Continued albuterol nebulizer every 6 hourly, IV Solu-Medrol, for now we will continue Rocephin/Zithromax probably can taper the antibiotics soon.  - Peak expiratory flow measurements.  -May need pulmonary outpatient appointment upon discharge.  -We will get records from recent [facility] work-up.    DVT PXL  -michael's/scds  -lovenox-not initiated.      I discussed the patients findings and my recommendations with: patient/family.    I believe this patient meets INPATIENT status due to the need for care which can only be reasonably provided in an hospital setting such as aggressive/expedited ancillary services and/or consultation services, the necessity for IV medications, close physician monitoring and/or the possible need for procedures.  In such, I feel patient’s risk for adverse outcomes and need for care warrant INPATIENT evaluation and predict the patient’s care encounter to likely last beyond 2 midnights.        Denzel Riddle MD  06/08/19  2:08 AM            Electronically signed by Denzel Riddle MD at 6/8/2019  2:24 AM          Emergency Department Notes      Jose G Roberson MD at 6/7/2019 11:58 PM      Procedure Orders    1. Critical Care [[id number]] ordered by Jose G Roberson MD at 06/08/19 0141                Subjective   24-year-old female presents for evaluation of asthma exacerbation.  Of note, the patient has a long history of asthma and states that she has had to be hospitalized multiple times in the past.  She has never been intubated.  She states that she has not felt well for the past 3 weeks.  Over that span, she has been to both urgent care and an outside emergency department for her symptoms.  She has completed 2 outpatient courses of oral steroids.  She was  diagnosed with rhinovirus by PCR last week.  However, she has continued to be symptomatic and states that over the past 24 hours she has had significantly increased dyspnea when compared to baseline despite using her nebs as directed.  She endorses a nonproductive cough as well as persistent wheezing.  She is unsure as to what may have triggered her symptoms.  No sick contacts.        History provided by:  Patient  Shortness of Breath   Severity:  Severe  Onset quality:  Gradual  Duration:  1 day  Timing:  Constant  Progression:  Worsening  Chronicity:  Recurrent  Relieved by:  Nothing  Worsened by:  Nothing  Ineffective treatments: Steroids.  Associated symptoms: cough and wheezing    Associated symptoms: no fever        Review of Systems   Constitutional: Negative for fever.   Respiratory: Positive for cough, shortness of breath and wheezing.    All other systems reviewed and are negative.      No past medical history on file.    No Known Allergies    No past surgical history on file.    No family history on file.    Social History     Socioeconomic History   • Marital status: Single     Spouse name: Not on file   • Number of children: Not on file   • Years of education: Not on file   • Highest education level: Not on file         Objective   Physical Exam   Constitutional: She is oriented to person, place, and time. She appears well-developed and well-nourished. No distress.   Nontoxic-appearing female, labored breathing   HENT:   Head: Normocephalic and atraumatic.   Mouth/Throat: Oropharynx is clear and moist.   Neck: Neck supple. No JVD present.   Cardiovascular: Regular rhythm and normal heart sounds. Exam reveals no gallop and no friction rub.   No murmur heard.  Tachycardic   Pulmonary/Chest: She is in respiratory distress. She has wheezes. She has no rales.   Labored breathing, poor air movement noted bilaterally, expiratory wheezing noted bilaterally, speaking in short sentences, no retractions    Abdominal: Soft. Bowel sounds are normal. She exhibits no distension and no mass. There is no tenderness. There is no rebound and no guarding.   Musculoskeletal: Normal range of motion.   Neurological: She is alert and oriented to person, place, and time.   Skin: Skin is warm and dry. No rash noted. She is not diaphoretic. No erythema.   Psychiatric: She has a normal mood and affect. Judgment and thought content normal.   Nursing note and vitals reviewed.      Critical Care  Performed by: Jose G Roberson MD  Authorized by: Jose G Roberson MD     Critical care provider statement:     Critical care time (minutes):  35    Critical care was necessary to treat or prevent imminent or life-threatening deterioration of the following conditions:  Respiratory failure    Critical care was time spent personally by me on the following activities:  Development of treatment plan with patient or surrogate, evaluation of patient's response to treatment, examination of patient, obtaining history from patient or surrogate, ordering and performing treatments and interventions, ordering and review of laboratory studies, ordering and review of radiographic studies, pulse oximetry and re-evaluation of patient's condition              ED Course  ED Course as of Jun 08 0224   Sat Jun 08, 2019   0128 D-Dimer, Quant: 0.55 [DD]   0222 24-year-old female presents for evaluation of asthma exacerbation.  Of note, the patient states that she has been hospitalized for asthma multiple times in the past but has never been intubated.  On arrival to the ED, patient with labored breathing and room air oxygen saturations of 90%.  Supplemental oxygen given.  Nebs and steroids given.  Exam remarkable for poor air movement bilaterally, audible wheezing, and moderate respiratory distress.  Chest x-ray somewhat equivocal but suggestive of potential pneumonia which does fit the patient's clinical picture.  Rocephin and azithromycin given.  Blood  "cultures pending.  Low risk Wells and d-dimer negative.  Following multiple nebs, patient with persistently labored breathing and oxygen saturations in the upper 80s/low 90s.  At this point, I feel that she warrants admission to the hospital for further evaluation and treatment.  I discussed her case with Dr. Riddle, and she will be admitted under her care.    [DD]      ED Course User Index  [DD] Jose G Roberson MD      No results found for this or any previous visit (from the past 24 hour(s)).  Note: In addition to lab results from this visit, the labs listed above may include labs taken at another facility or during a different encounter within the last 24 hours. Please correlate lab times with ED admission and discharge times for further clarification of the services performed during this visit.    XR Chest 1 View    (Results Pending)     Vitals:    06/07/19 2349   BP: 135/71   BP Location: Left arm   Patient Position: Sitting   Pulse: 120   Resp: 26   Temp: 98.8 °F (37.1 °C)   TempSrc: Oral   SpO2: 90%   Weight: 111 kg (245 lb)   Height: 152.4 cm (60\")     Medications   sodium chloride 0.9 % flush 10 mL (not administered)   ipratropium-albuterol (DUO-NEB) nebulizer solution 3 mL (not administered)   methylPREDNISolone sodium succinate (SOLU-Medrol) injection 125 mg (not administered)     ECG/EMG Results (last 24 hours)     ** No results found for the last 24 hours. **        No orders to display                     Recent Results (from the past 24 hour(s))   Lactic Acid, Plasma    Collection Time: 06/08/19 12:05 AM   Result Value Ref Range    Lactate 1.1 0.5 - 2.0 mmol/L   CBC Auto Differential    Collection Time: 06/08/19 12:05 AM   Result Value Ref Range    WBC 8.11 3.40 - 10.80 10*3/mm3    RBC 6.24 (H) 3.77 - 5.28 10*6/mm3    Hemoglobin 14.2 12.0 - 15.9 g/dL    Hematocrit 46.8 (H) 34.0 - 46.6 %    MCV 75.0 (L) 79.0 - 97.0 fL    MCH 22.8 (L) 26.6 - 33.0 pg    MCHC 30.3 (L) 31.5 - 35.7 g/dL    RDW 15.1 12.3 " - 15.4 %    RDW-SD 39.4 37.0 - 54.0 fl    MPV 11.2 6.0 - 12.0 fL    Platelets 316 140 - 450 10*3/mm3    Neutrophil % 63.7 42.7 - 76.0 %    Lymphocyte % 20.0 19.6 - 45.3 %    Monocyte % 11.2 5.0 - 12.0 %    Eosinophil % 3.6 0.3 - 6.2 %    Basophil % 1.1 0.0 - 1.5 %    Immature Grans % 0.4 0.0 - 0.5 %    Neutrophils, Absolute 5.17 1.70 - 7.00 10*3/mm3    Lymphocytes, Absolute 1.62 0.70 - 3.10 10*3/mm3    Monocytes, Absolute 0.91 (H) 0.10 - 0.90 10*3/mm3    Eosinophils, Absolute 0.29 0.00 - 0.40 10*3/mm3    Basophils, Absolute 0.09 0.00 - 0.20 10*3/mm3    Immature Grans, Absolute 0.03 0.00 - 0.05 10*3/mm3    nRBC 0.0 0.0 - 0.2 /100 WBC   Lavender Top    Collection Time: 06/08/19 12:05 AM   Result Value Ref Range    Extra Tube hold for add-on    Comprehensive Metabolic Panel    Collection Time: 06/08/19 12:06 AM   Result Value Ref Range    Glucose 94 65 - 99 mg/dL    BUN 6 6 - 20 mg/dL    Creatinine 0.66 0.57 - 1.00 mg/dL    Sodium 140 136 - 145 mmol/L    Potassium 3.8 3.5 - 5.2 mmol/L    Chloride 104 98 - 107 mmol/L    CO2 22.0 22.0 - 29.0 mmol/L    Calcium 9.2 8.6 - 10.5 mg/dL    Total Protein 7.4 6.0 - 8.5 g/dL    Albumin 4.40 3.50 - 5.20 g/dL    ALT (SGPT) 35 (H) 1 - 33 U/L    AST (SGOT) 35 (H) 1 - 32 U/L    Alkaline Phosphatase 59 39 - 117 U/L    Total Bilirubin 0.7 0.2 - 1.2 mg/dL    eGFR Non African Amer 111 >60 mL/min/1.73    Globulin 3.0 gm/dL    A/G Ratio 1.5 g/dL    BUN/Creatinine Ratio 9.1 7.0 - 25.0    Anion Gap 14.0 mmol/L   Light Blue Top    Collection Time: 06/08/19 12:06 AM   Result Value Ref Range    Extra Tube hold for add-on    Green Top (Gel)    Collection Time: 06/08/19 12:06 AM   Result Value Ref Range    Extra Tube Hold for add-ons.    Gold Top - SST    Collection Time: 06/08/19 12:06 AM   Result Value Ref Range    Extra Tube Hold for add-ons.    D-dimer, Quantitative    Collection Time: 06/08/19 12:06 AM   Result Value Ref Range    D-Dimer, Quantitative 0.55 0.00 - 0.56 MCGFEU/mL   Blood Gas,  Arterial With Co-Ox    Collection Time: 06/08/19  1:56 AM   Result Value Ref Range    Site Right Radial     Ismael's Test Positive     pH, Arterial 7.465 (H) 7.350 - 7.450 pH units    pCO2, Arterial 31.6 (L) 35.0 - 45.0 mm Hg    pO2, Arterial 70.7 (L) 83.0 - 108.0 mm Hg    HCO3, Arterial 22.8 20.0 - 26.0 mmol/L    Base Excess, Arterial -0.2 (L) 0.0 - 2.0 mmol/L    Hemoglobin, Blood Gas 14.1 14 - 18 g/dL    Hematocrit, Blood Gas 43.1 %    Oxyhemoglobin 94.4 94 - 99 %    Methemoglobin 0.80 0.00 - 1.50 %    Carboxyhemoglobin 1.2 0 - 2 %    CO2 Content 23.7 23 - 27 mmol/L    Temperature 37.0 C    Barometric Pressure for Blood Gas  mmHg    Modality Nasal Cannula     FIO2 28 %    Ventilator Mode       Note      pH, Temp Corrected 7.465 pH Units    pCO2, Temperature Corrected 31.6 (L) 35 - 45 mm Hg    pO2, Temperature Corrected 70.7 (L) 83 - 108 mm Hg     Note: In addition to lab results from this visit, the labs listed above may include labs taken at another facility or during a different encounter within the last 24 hours. Please correlate lab times with ED admission and discharge times for further clarification of the services performed during this visit.    XR Chest 1 View   Final Result      1.  Opacity obscuring the right heart border most likely reflective of atelectasis in light of patient's clinical history though pneumonia not excluded.      Signer Name: Porfirio Maharaj MD    Signed: 6/8/2019 12:35 AM    Workstation Name: [id number]            Vitals:    06/08/19 0100 06/08/19 0130 06/08/19 0156 06/08/19 0200   BP: 131/82 132/58  127/74   Pulse: 110 98 88 96   Resp:       Temp:       TempSrc:       SpO2: 92% 94% 97% 98%   Weight:       Height:         Medications   sodium chloride 0.9 % flush 10 mL (not administered)   cefTRIAXone (ROCEPHIN) IVPB 1 g (not administered)   azithromycin 500 MG/250 ML 0.9% NS IVPB (MBP) (500 mg Intravenous New Bag 6/8/19 0209)   ipratropium-albuterol (DUO-NEB) nebulizer solution 3  "mL (3 mL Nebulization Given 6/8/19 0025)   methylPREDNISolone sodium succinate (SOLU-Medrol) injection 125 mg (125 mg Intravenous Given 6/8/19 0009)   ipratropium-albuterol (DUO-NEB) nebulizer solution 3 mL (3 mL Nebulization Given 6/8/19 0157)     ECG/EMG Results (last 24 hours)     Procedure Component Value Units Date/Time    ECG 12 Lead [[id number]] Collected:  06/08/19 0036     Updated:  06/08/19 0034        ECG 12 Lead                 MDM    Final diagnoses:   Severe asthma with status asthmaticus, unspecified whether persistent   Hypoxia       Documentation assistance provided by scribe Edi Aguilar.  Information recorded by the scribe was done at my direction and has been verified and validated by me.     Edi Aguilar  06/08/19 0003       Jose G Roberson MD  06/08/19 0224      Electronically signed by Jose G Roberson MD at 6/8/2019  2:24 AM       ICU Vital Signs     Row Name 06/08/19 1348 06/08/19 1121 06/08/19 0800 06/08/19 0705 06/08/19 0600       Vitals    Temp  --  98 °F (36.7 °C)  --  --  --    Temp src  --  Oral  --  --  --    Pulse  102  98  --  88  --    Heart Rate Source  Monitor  Monitor  --  Monitor  --    Resp  16  18  --  18  --    Resp Rate Source  Visual  Visual  --  Visual  --    BP  --  118/69  --  --  --    BP Location  --  Right arm  --  --  --    BP Method  --  Automatic  --  --  --    Patient Position  --  Lying  --  --  --       Oxygen Therapy    SpO2  --  92 %  --  93 %  --    Pulse Oximetry Type  --  --  --  Continuous  --    Device (Oxygen Therapy)  humidified;nasal cannula  --  humidified;nasal cannula  humidified;nasal cannula  nasal cannula;humidified    Flow (L/min)  --  --  3  3  3    Row Name 06/08/19 0523 06/08/19 0319 06/08/19 0315 06/08/19 0300 06/08/19 0230       Height and Weight    Height  --  152.4 cm (60\")  --  --  --    Height Method  --  Stated  --  --  --    Weight  --  108 kg (238 lb)  --  --  --    Weight Method  --  Standing scale  --  --  --    " "Ideal Body Weight (IBW) (kg)  --  45.86  --  --  --    BSA (Calculated - sq m)  --  2.01 sq meters  --  --  --    BMI (Calculated)  --  46.5  --  --  --    Weight in (lb) to have BMI = 25  --  127.7  --  --  --       Vitals    Temp  --  98.2 °F (36.8 °C)  --  --  --    Temp src  --  Oral  --  --  --    Pulse  --  109  --  102  100    Heart Rate Source  --  Monitor  --  --  --    Resp  --  20  --  --  --    Resp Rate Source  --  Visual  --  --  --    BP  --  133/75  --  125/49  117/57    Noninvasive MAP (mmHg)  --  92  --  62  76    BP Location  --  Left arm  --  --  --    BP Method  --  Automatic  --  --  --    Patient Position  --  Lying  --  --  --       Oxygen Therapy    SpO2  --  96 %  --  92 %  95 %    Pulse Oximetry Type  Continuous  --  --  --  --    Device (Oxygen Therapy)  nasal cannula  --  nasal cannula  --  --    Flow (L/min)  3  --  2  --  --    Row Name 06/08/19 0200 06/08/19 0156 06/08/19 0130 06/08/19 0100 06/08/19 0043       Vitals    Pulse  96  88  98  110  107    BP  127/74  --  132/58  131/82  --    Noninvasive MAP (mmHg)  93  --  82  101  --       Oxygen Therapy    SpO2  98 %  97 %  94 %  92 %  88 %  (Abnormal)     Row Name 06/08/19 0038 06/08/19 0025 06/08/19 0002 06/08/19 0000 06/07/19 2349       Height and Weight    Height  --  --  --  --  152.4 cm (60\")    Height Method  --  --  --  --  Stated    Weight  --  --  --  --  111 kg (245 lb)    Weight Method  --  --  --  --  Stated    Ideal Body Weight (IBW) (kg)  --  --  --  --  45.86    BSA (Calculated - sq m)  --  --  --  --  2.03 sq meters    BMI (Calculated)  --  --  --  --  47.8    Weight in (lb) to have BMI = 25  --  --  --  --  127.7       Vitals    Temp  --  --  --  --  98.8 °F (37.1 °C)    Temp src  --  --  --  --  Oral    Pulse  110  96  108  --  120    Heart Rate Source  --  Monitor  --  --  Monitor    Resp  --  22  --  --  26    Resp Rate Source  --  Visual  --  --  Visual    BP  --  --  --  133/72  135/71    Noninvasive MAP (mmHg)  " --  --  --  100  --    BP Location  --  --  --  --  Left arm    BP Method  --  --  --  --  Automatic    Patient Position  --  --  --  --  Sitting       Oxygen Therapy    SpO2  87 %  (Abnormal)   95 %  93 %  95 %  90 % Simultaneous filing. User may be unaware of other data.    Pulse Oximetry Type  --  Continuous  --  --  Intermittent    Device (Oxygen Therapy)  --  room air  --  --  room air Simultaneous filing. User may be unaware of other data.        Hospital Medications (all)       Dose Frequency Start End    acetaminophen (TYLENOL) tablet 650 mg 650 mg Every 4 Hours PRN 6/8/2019     Sig - Route: Take 2 tablets by mouth Every 4 (Four) Hours As Needed for Mild Pain . - Oral    azithromycin 500 MG/250 ML 0.9% NS IVPB (MBP) 500 mg Once 6/8/2019 6/8/2019    Sig - Route: Infuse 250 mL into a venous catheter 1 (One) Time. - Intravenous    benzonatate (TESSALON) capsule 200 mg 200 mg 3 Times Daily PRN 6/8/2019     Sig - Route: Take 2 capsules by mouth 3 (Three) Times a Day As Needed for Cough. - Oral    budesonide-formoterol (SYMBICORT) 160-4.5 MCG/ACT inhaler 2 puff 2 puff 2 Times Daily - RT 6/8/2019     Sig - Route: Inhale 2 puffs 2 (Two) Times a Day. - Inhalation    heparin (porcine) 5000 UNIT/ML injection 5,000 Units 5,000 Units Every 8 Hours Scheduled 6/8/2019     Sig - Route: Inject 1 mL under the skin into the appropriate area as directed Every 8 (Eight) Hours. - Subcutaneous    ipratropium-albuterol (DUO-NEB) nebulizer solution 3 mL 3 mL Once 6/8/2019 6/8/2019    Sig - Route: Take 3 mL by nebulization 1 (One) Time. - Nebulization    ipratropium-albuterol (DUO-NEB) nebulizer solution 3 mL 3 mL Once 6/8/2019 6/8/2019    Sig - Route: Take 3 mL by nebulization 1 (One) Time. - Nebulization    ipratropium-albuterol (DUO-NEB) nebulizer solution 3 mL 3 mL Every 6 Hours - RT 6/8/2019     Sig - Route: Take 3 mL by nebulization Every 6 (Six) Hours. - Nebulization    melatonin tablet 5 mg 5 mg Nightly PRN 6/8/2019     Sig -  Route: Take 1 tablet by mouth At Night As Needed for Sleep. - Oral    methylPREDNISolone sodium succinate (SOLU-Medrol) injection 125 mg 125 mg Once 6/8/2019 6/8/2019    Sig - Route: Infuse 2 mL into a venous catheter 1 (One) Time. - Intravenous    methylPREDNISolone sodium succinate (SOLU-Medrol) injection 60 mg 60 mg Every 8 Hours 6/8/2019     Sig - Route: Infuse 0.96 mL into a venous catheter Every 8 (Eight) Hours. - Intravenous    montelukast (SINGULAIR) tablet 10 mg 10 mg Nightly 6/8/2019     Sig - Route: Take 1 tablet by mouth Every Night. - Oral    ondansetron (ZOFRAN) injection 4 mg 4 mg Once 6/8/2019 6/8/2019    Sig - Route: Infuse 2 mL into a venous catheter 1 (One) Time. - Intravenous    sodium chloride 0.9 % flush 10 mL 10 mL As Needed 6/7/2019     Sig - Route: Infuse 10 mL into a venous catheter As Needed for Line Care. - Intravenous    Cosign for Ordering: Accepted by Jose G Roberson MD on 6/8/2019  3:54 AM    sodium chloride 0.9 % flush 3 mL 3 mL Every 12 Hours Scheduled 6/8/2019     Sig - Route: Infuse 3 mL into a venous catheter Every 12 (Twelve) Hours. - Intravenous    sodium chloride 0.9 % flush 3-10 mL 3-10 mL As Needed 6/8/2019     Sig - Route: Infuse 3-10 mL into a venous catheter As Needed for Line Care. - Intravenous    azithromycin 500 MG/250 ML 0.9% NS IVPB (MBP) (Discontinued) 500 mg Every 24 Hours 6/9/2019 6/8/2019    Sig - Route: Infuse 250 mL into a venous catheter Daily. - Intravenous    cefTRIAXone (ROCEPHIN) 1 g/100 mL 0.9% NS (MBP) (Discontinued) 1 g Every 24 Hours Scheduled 6/8/2019 6/8/2019    Sig - Route: Infuse 100 mL into a venous catheter Daily. - Intravenous    cefTRIAXone (ROCEPHIN) 1 g/100 mL 0.9% NS (MBP) (Discontinued) 1 g Every 24 Hours Scheduled 6/8/2019 6/8/2019    Sig - Route: Infuse 100 mL into a venous catheter Daily. - Intravenous    cefTRIAXone (ROCEPHIN) IVPB 1 g (Discontinued) 1 g Once 6/8/2019 6/8/2019    Sig - Route: Infuse 50 mL into a venous catheter 1  (One) Time. - Intravenous    Reason for Discontinue: Duplicate order            Lab Results (last 24 hours)     Procedure Component Value Units Date/Time    [facility] [[id number]] Collected:  06/08/19 0005    Specimen:  Blood Updated:  06/08/19 0801    Narrative:       The following orders were created for panel order [facility].  Procedure                               Abnormality         Status                     ---------                               -----------         ------                     Light Blue Top[[id number]]                                   Final result               Green Top (Gel)[[id number]]                                  Final result               Lavender Top[[id number]]                                     Final result               Gold Top - SST[[id number]]                                   Final result               Green Top (No Gel)[[id number]]                                                            Please view results for these tests on the individual orders.    CBC (No Diff) [[id number]]  (Abnormal) Collected:  06/08/19 0609    Specimen:  Blood Updated:  06/08/19 0722     WBC 6.72 10*3/mm3      RBC 5.99 10*6/mm3      Hemoglobin 13.6 g/dL      Hematocrit 45.2 %      MCV 75.5 fL      MCH 22.7 pg      MCHC 30.1 g/dL      RDW 15.3 %      RDW-SD 40.8 fl      MPV 11.3 fL      Platelets 311 10*3/mm3     Basic Metabolic Panel [[id number]]  (Abnormal) Collected:  06/08/19 0609    Specimen:  Blood Updated:  06/08/19 0717     Glucose 208 mg/dL      BUN 8 mg/dL      Creatinine 0.74 mg/dL      Sodium 141 mmol/L      Potassium 4.5 mmol/L      Chloride 104 mmol/L      CO2 24.0 mmol/L      Calcium 8.7 mg/dL      eGFR Non African Amer 97 mL/min/1.73      BUN/Creatinine Ratio 10.8     Anion Gap 13.0 mmol/L     Narrative:       GFR Normal >60  Chronic Kidney Disease <60  Kidney Failure <15    Blood Culture - Blood, Arm, Left [[id number]] Collected:  06/08/19 0023    Specimen:  Blood from Arm, Left  Updated:  06/08/19 0508    Blood Culture - Blood, Hand, Right [[id number]] Collected:  06/08/19 0029    Specimen:  Blood from Hand, Right Updated:  06/08/19 0508    Blood Gas, Arterial With Co-Ox [[id number]]  (Abnormal) Collected:  06/08/19 0156    Specimen:  Arterial Blood Updated:  06/08/19 0203     Site Right Radial     Ismael's Test Positive     pH, Arterial 7.465 pH units      Comment: 83 Value above reference range        pCO2, Arterial 31.6 mm Hg      Comment: 84 Value below reference range        pO2, Arterial 70.7 mm Hg      Comment: 84 Value below reference range        HCO3, Arterial 22.8 mmol/L      Base Excess, Arterial -0.2 mmol/L      Hemoglobin, Blood Gas 14.1 g/dL      Hematocrit, Blood Gas 43.1 %      Oxyhemoglobin 94.4 %      Methemoglobin 0.80 %      Carboxyhemoglobin 1.2 %      CO2 Content 23.7 mmol/L      Temperature 37.0 C      Barometric Pressure for Blood Gas -- mmHg      Comment: N/A        Modality Nasal Cannula     FIO2 28 %      Ventilator Mode       Comment: Meter: [id number]     :  [id number]        Note --     pH, Temp Corrected 7.465 pH Units      pCO2, Temperature Corrected 31.6 mm Hg      pO2, Temperature Corrected 70.7 mm Hg     Light Blue Top [[id number]] Collected:  06/08/19 0006    Specimen:  Blood Updated:  06/08/19 0115     Extra Tube hold for add-on     Comment: Auto resulted       Green Top (Gel) [[id number]] Collected:  06/08/19 0006    Specimen:  Blood Updated:  06/08/19 0115     Extra Tube Hold for add-ons.     Comment: Auto resulted.       Lavender Top [[id number]] Collected:  06/08/19 0005    Specimen:  Blood Updated:  06/08/19 0115     Extra Tube hold for add-on     Comment: Auto resulted       Gold Top - SST [[id number]] Collected:  06/08/19 0006    Specimen:  Blood Updated:  06/08/19 0115     Extra Tube Hold for add-ons.     Comment: Auto resulted.       D-dimer, Quantitative [[id number]]  (Normal) Collected:  06/08/19 0006    Specimen:  Blood Updated:   06/08/19 0114     D-Dimer, Quantitative 0.55 MCGFEU/mL     Comprehensive Metabolic Panel [[id number]]  (Abnormal) Collected:  06/08/19 0006    Specimen:  Blood Updated:  06/08/19 0043     Glucose 94 mg/dL      BUN 6 mg/dL      Creatinine 0.66 mg/dL      Sodium 140 mmol/L      Potassium 3.8 mmol/L      Chloride 104 mmol/L      CO2 22.0 mmol/L      Calcium 9.2 mg/dL      Total Protein 7.4 g/dL      Albumin 4.40 g/dL      ALT (SGPT) 35 U/L      AST (SGOT) 35 U/L      Alkaline Phosphatase 59 U/L      Total Bilirubin 0.7 mg/dL      eGFR Non African Amer 111 mL/min/1.73      Globulin 3.0 gm/dL      A/G Ratio 1.5 g/dL      BUN/Creatinine Ratio 9.1     Anion Gap 14.0 mmol/L     Narrative:       GFR Normal >60  Chronic Kidney Disease <60  Kidney Failure <15    Lactic Acid, Plasma [[id number]]  (Normal) Collected:  06/08/19 0005    Specimen:  Blood Updated:  06/08/19 0039     Lactate 1.1 mmol/L      Comment: Falsely depressed results may occur on samples drawn from patients receiving N-Acetylcysteine (NAC) or Metamizole.       CBC & Differential [[id number]] Collected:  06/08/19 0005    Specimen:  Blood Updated:  06/08/19 0038    Narrative:       The following orders were created for panel order CBC & Differential.  Procedure                               Abnormality         Status                     ---------                               -----------         ------                     CBC Auto Differential[[id number]]        Abnormal            Final result                 Please view results for these tests on the individual orders.    CBC Auto Differential [[id number]]  (Abnormal) Collected:  06/08/19 0005    Specimen:  Blood Updated:  06/08/19 0038     WBC 8.11 10*3/mm3      RBC 6.24 10*6/mm3      Hemoglobin 14.2 g/dL      Hematocrit 46.8 %      MCV 75.0 fL      MCH 22.8 pg      MCHC 30.3 g/dL      RDW 15.1 %      RDW-SD 39.4 fl      MPV 11.2 fL      Platelets 316 10*3/mm3      Neutrophil % 63.7 %      Lymphocyte % 20.0 %       Monocyte % 11.2 %      Eosinophil % 3.6 %      Basophil % 1.1 %      Immature Grans % 0.4 %      Neutrophils, Absolute 5.17 10*3/mm3      Lymphocytes, Absolute 1.62 10*3/mm3      Monocytes, Absolute 0.91 10*3/mm3      Eosinophils, Absolute 0.29 10*3/mm3      Basophils, Absolute 0.09 10*3/mm3      Immature Grans, Absolute 0.03 10*3/mm3      nRBC 0.0 /100 WBC         Imaging Results (last 24 hours)     Procedure Component Value Units Date/Time    XR Chest 1 View [[id number]] Collected:  06/08/19 0035     Updated:  06/08/19 0037    Narrative:       CR Chest 1 Vw    SIGNS AND SYMPTOMS:  soa, cough Trouble breathing onset 1 day ago. Hx of asthma     COMPARISONS:  None    FINDINGS:    A portable AP view of the chest was obtained  Fully upright.    There is opacity within the right lung which obscures the right heart border. This raises the possibility of atelectasis given patient history though pneumonia could perhaps also have this appearance. The cardiomediastinal silhouette and pulmonary  vascularity are normal. No pneumothorax or pleural effusion is identified. The bones are normal for age.      Impression:         1.  Opacity obscuring the right heart border most likely reflective of atelectasis in light of patient's clinical history though pneumonia not excluded.    Signer Name: Porfirio Maharaj MD   Signed: 6/8/2019 12:35 AM   Workstation Name: [id number]           ECG/EMG Results (last 24 hours)     Procedure Component Value Units Date/Time    ECG 12 Lead [[id number]] Collected:  06/08/19 0036     Updated:  06/08/19 0034          Orders (last 24 hrs)     Start     Ordered    06/09/19 0600  azithromycin 500 MG/250 ML 0.9% NS IVPB (MBP)  Every 24 Hours,   Status:  Discontinued      06/08/19 0320 06/08/19 2100  montelukast (SINGULAIR) tablet 10 mg  Nightly      06/08/19 0320 06/08/19 2100  cefTRIAXone (ROCEPHIN) 1 g/100 mL 0.9% NS (MBP)  Every 24 Hours Scheduled,   Status:  Discontinued      06/08/19 0428     06/08/19 1442  Inpatient Admission  Once      06/08/19 1443    06/08/19 0930  budesonide-formoterol (SYMBICORT) 160-4.5 MCG/ACT inhaler 2 puff  2 Times Daily - RT      06/08/19 0320    06/08/19 0900  sodium chloride 0.9 % flush 3 mL  Every 12 Hours Scheduled      06/08/19 0320    06/08/19 0800  methylPREDNISolone sodium succinate (SOLU-Medrol) injection 60 mg  Every 8 Hours      06/08/19 0320    06/08/19 0700  Peak Flow  Every 8 Hours - RT      06/08/19 0142    06/08/19 0700  ipratropium-albuterol (DUO-NEB) nebulizer solution 3 mL  Every 6 Hours - RT      06/08/19 0320    06/08/19 0600  Incentive Spirometry  Every 4 Hours While Awake      06/08/19 0320    06/08/19 0600  heparin (porcine) 5000 UNIT/ML injection 5,000 Units  Every 8 Hours Scheduled      06/08/19 0320 06/08/19 0600  Basic Metabolic Panel  Morning Draw      06/08/19 0320    06/08/19 0600  CBC (No Diff)  Morning Draw      06/08/19 0320    06/08/19 0400  Vital Signs  Every 4 Hours      06/08/19 0320    06/08/19 0345  cefTRIAXone (ROCEPHIN) 1 g/100 mL 0.9% NS (MBP)  Every 24 Hours Scheduled,   Status:  Discontinued      06/08/19 0320    06/08/19 0321  Intake & Output  Every Shift      06/08/19 0320    06/08/19 0321  Weigh Patient  Once      06/08/19 0320    06/08/19 0321  Oxygen Therapy- Nasal Cannula; Titrate for SPO2: 90% - 95%  Continuous      06/08/19 0320    06/08/19 0321  Insert Peripheral IV  Once      06/08/19 0320    06/08/19 0321  Saline Lock & Maintain IV Access  Continuous      06/08/19 0320    06/08/19 0321  Diet Regular  Diet Effective Now      06/08/19 0320    06/08/19 0320  sodium chloride 0.9 % flush 3-10 mL  As Needed      06/08/19 0320    06/08/19 0320  acetaminophen (TYLENOL) tablet 650 mg  Every 4 Hours PRN      06/08/19 0320    06/08/19 0320  melatonin tablet 5 mg  Nightly PRN      06/08/19 0320    06/08/19 0320  benzonatate (TESSALON) capsule 200 mg  3 Times Daily PRN      06/08/19 0320    06/08/19 0234  ondansetron (ZOFRAN)  injection 4 mg  Once      06/08/19 0232    06/08/19 0224  Obtain Medical Records  Until Discontinued     Comments:  [facility] recent ed visit for asthma    06/08/19 0223    06/08/19 0204  Blood Gas, Arterial With Co-Ox  Once      06/08/19 0156    06/08/19 0143  Code Status and Medical Interventions:  Continuous      06/08/19 0145    06/08/19 0142  Critical Care  Once     Comments:  This order was created via procedure documentation    06/08/19 0141    06/08/19 0140  Blood Gas, Arterial  STAT      06/08/19 0139    06/08/19 0140  Tele Bed Request  Once      06/08/19 0139    06/08/19 0131  cefTRIAXone (ROCEPHIN) IVPB 1 g  Once,   Status:  Discontinued      06/08/19 0129    06/08/19 0131  azithromycin 500 MG/250 ML 0.9% NS IVPB (MBP)  Once      06/08/19 0129    06/08/19 0113  ipratropium-albuterol (DUO-NEB) nebulizer solution 3 mL  Once      06/08/19 0111    06/08/19 0005  ECG 12 Lead  Once      06/08/19 0004    06/08/19 0004  ipratropium-albuterol (DUO-NEB) nebulizer solution 3 mL  Once      06/08/19 0002    06/08/19 0004  methylPREDNISolone sodium succinate (SOLU-Medrol) injection 125 mg  Once      06/08/19 0002    06/08/19 0003  D-dimer, Quantitative  Once      06/08/19 0002    06/07/19 2353  XR Chest 1 View  1 Time Imaging      06/07/19 2352    06/07/19 2352  Undress and Gown  Once      06/07/19 2351    06/07/19 2352  Continuous Pulse Oximetry  Continuous      06/07/19 2351    06/07/19 2352  Vital Signs  Every 30 Minutes      06/07/19 2351    06/07/19 2352  Insert Peripheral IV  Once      06/07/19 2351    06/07/19 2352  CBC & Differential  Once      06/07/19 2351    06/07/19 2352  Comprehensive Metabolic Panel  Once      06/07/19 2351    06/07/19 2352  Lactic Acid, Plasma  Once      06/07/19 2351    06/07/19 2352  [place] [facility] Draw  Once      06/07/19 2351 06/07/19 2352  Blood Culture - Blood,  Once      06/07/19 2351    06/07/19 2352  Blood Culture - Blood,  Once      06/07/19 2351 06/07/19 2352  CBC Auto Differential   PROCEDURE ONCE      06/07/19 2351 06/07/19 2352  Light Blue Top  PROCEDURE ONCE      06/07/19 2351 06/07/19 2352  Green Top (Gel)  PROCEDURE ONCE      06/07/19 2351 06/07/19 2352  Lavender Top  PROCEDURE ONCE      06/07/19 2351 06/07/19 2352  Gold Top - SST  PROCEDURE ONCE      06/07/19 2351 06/07/19 2352  Green Top (No Gel)  PROCEDURE ONCE,   Status:  Canceled      06/07/19 2351 06/07/19 2351  Oxygen Therapy- Nasal Cannula; 2 LPM; Titrate for SPO2: 92%, Greater Than or Equal To  Continuous PRN,   Status:  Canceled      06/07/19 2351 06/07/19 2351  sodium chloride 0.9 % flush 10 mL  As Needed      06/07/19 2351    --  albuterol (PROVENTIL) (2.5 MG/3ML) 0.083% nebulizer solution  Every 4 Hours PRN      06/08/19 0012    --  mometasone-formoterol (DULERA 200) 200-5 MCG/ACT inhaler  2 Times Daily - RT      06/08/19 0012    --  montelukast (SINGULAIR) 10 MG tablet  Nightly      06/08/19 0012    --  benzonatate (TESSALON) 200 MG capsule  3 Times Daily PRN      06/08/19 0012               Physician Progress Notes (last 24 hours) (Notes from 6/7/2019  2:47 PM through 6/8/2019  2:47 PM)      Jason Pete MD at 6/8/2019  2:43 PM        Patient seen and examined at the bedside.  Patient resting in bed in no acute distress but still has very vigorous cough and has pleuritic chest pain because of the cough.  Overall she feels better compared to when she came in.  No fever or chills.  No nausea, vomiting, diarrhea, abdominal pain.  -We will continue neb treatment, oxygen treatment, steroids  -Medicine will follow.    Electronically signed by Jason Pete MD at 6/8/2019  2:44 PM       Consult Notes (last 24 hours) (Notes from 6/7/2019  2:47 PM through 6/8/2019  2:47 PM)     No notes of this type exist for this encounter.        Respiratory Therapy Notes (last 24 hours) (Notes from 6/7/2019  2:47 PM through 6/8/2019  2:47 PM)     No notes of this type exist for this encounter.           Nursing  Assessments (last 24 hours)      Adult PCS Body System     Row Name 06/08/19 1000 06/08/19 0800 06/08/19 0600 06/08/19 0400 06/08/19 0315       Pain/Comfort/Sleep    Presence of Pain  complains of pain/discomfort  complains of pain/discomfort  non-verbal indicators absent  --  complains of pain/discomfort    Preferred Pain Scale  number (Numeric Rating Pain Scale)  number (Numeric Rating Pain Scale)  --  --  number (Numeric Rating Pain Scale)    Pain Body Location - Orientation  generalized  generalized  --  --  generalized    Pain Rating (0-10): Rest  --  0  --  --  6    Pain Rating (0-10): Activity  --  0  --  --  --    Sleep/Rest/Relaxation  awake  awake  appears asleep  --  awake       Coping/Psychosocial    Observed Emotional State  accepting;calm;cooperative  accepting;calm;cooperative  --  --  accepting;calm;cooperative    Verbalized Emotional State  acceptance  acceptance  --  --  acceptance    Plan of Care Reviewed With  --  --  --  --  patient       Psychosocial Support    Trust Relationship/Rapport  --  --  --  --  care explained;choices provided;questions answered;questions encouraged;thoughts/feelings acknowledged    Diversional Activities  --  --  --  --  smartphone    Family/Support System Care  --  --  --  --  self-care encouraged       Involvement in Care    Family/Support System, Persons  family  family  family  --  family    Involvement in Care  not present at bedside  not present at bedside  not present at bedside  --  not present at bedside       HEENT    HEENT WDL  WDL  WDL  --  --  WDL       Mouth/Teeth WDL    Mouth/Teeth WDL  WDL  WDL  --  --  WDL       Cognitive    Cognitive/Neuro/Behavioral WDL  WDL  WDL  WDL sleeping   --  WDL    Level of Consciousness  Alert  Alert  --  --  Alert    Orientation  oriented x 4  oriented x 4  --  --  oriented x 4    Mood/Behavior  calm;cooperative  calm;cooperative  --  --  calm;cooperative       Respiratory    Respiratory WDL  WDL except;breath  sounds;effort/expansion;cough;rhythm/pattern  WDL except;breath sounds;effort/expansion;cough;rhythm/pattern  WDL except;cough;rhythm/pattern  --  WDL except;breath sounds;effort/expansion;cough;rhythm/pattern    Rhythm/Pattern, Respiratory  tachypneic;shortness of breath  tachypneic;shortness of breath  depth regular;pattern regular;unlabored  --  tachypneic;shortness of breath    Expansion/Accessory Muscles/Retractions  expansion symmetric;no retractions;no use of accessory muscles  expansion symmetric;no retractions;no use of accessory muscles  expansion symmetric;no retractions;no use of accessory muscles  --  expansion symmetric;no retractions;no use of accessory muscles    Cough Frequency  frequent  frequent  frequent  --  frequent    Cough Type  good;productive  good;productive  good;productive  --  good;productive       Breath Sounds    Breath Sounds  All Fields  All Fields  --  --  All Fields    All Lung Fields Breath Sounds  Anterior:;Lateral:;diminished  Anterior:;Lateral:;diminished  --  --  Anterior:;Lateral:;diminished       Oxygen Therapy    Flow (L/min)  --  3  3  --  2    Device (Oxygen Therapy)  --  humidified;nasal cannula  nasal cannula;humidified  --  nasal cannula       Cardiac    Cardiac WDL  WDL  WDL  WDL  --  WDL    Additional Documentation  --  --  --  --  ECG (Group)       ECG    Lead Monitored  Lead II  Lead II  Lead II  --  Lead II    Rhythm  normal sinus rhythm  normal sinus rhythm  normal sinus rhythm  --  normal sinus rhythm       Peripheral Neurovascular    Peripheral Neurovascular WDL  WDL  WDL  --  --  WDL    VTE Prevention/Management  --  --  --  --  bilateral;dorsiflexion/plantar flexion performed       Neurovascular Assessment    Neurovascular Assessment  General  General  --  --  General    All Extremities Temperature  warm  warm  --  --  warm    All Extremities Color  no discoloration  no discoloration  --  --  no discoloration    All Extremities Sensation  tingling  present;numbness present In fingers and toes   tingling present;numbness present In fingers and toes   --  --  tingling present;numbness present In fingers and toes        Gastrointestinal    GI WDL  WDL  WDL  --  --  WDL    Last Bowel Movement  --  --  --  --  06/07/19       Genitourinary    Genitourinary WDL  WDL  WDL  --  --  WDL       Skin    Skin WDL  WDL;all  WDL;all  --  --  WDL;all    Skin Color/Characteristics  without discoloration  without discoloration  --  --  without discoloration    Skin Temperature  warm  warm  --  --  warm    Skin Moisture  dry;flaky  dry;flaky  --  --  dry;flaky    Skin Elasticity  quick return to original state  quick return to original state  --  --  quick return to original state    Skin Integrity  rash;intact  rash;intact  --  --  rash;intact    Additional Documentation  --  --  --  --  Rash (LDA)       Osman Risk Assessment (If Osman score </= 18, add the appropriate CPG to the care plan)    Sensory Perception  --  4-->no impairment  --  --  4-->no impairment    Moisture  --  4-->rarely moist  --  --  4-->rarely moist    Activity  --  3-->walks occasionally  --  --  4-->walks frequently    Mobility  --  3-->slightly limited  --  --  4-->no limitation    Nutrition  --  3-->adequate  --  --  3-->adequate    Friction and Shear  --  3-->no apparent problem  --  --  3-->no apparent problem    Osman Score  --  20  --  --  22       Rash 06/08/19 0315 Left wrist plaque    Rash - Properties Group Date first assessed: 06/08/19 Time first assessed: 0315 Side: Left Location: wrist Type: plaque    Distribution  localized  localized  --  --  localized    Characteristics  dry;itching;scaly  dry;itching;scaly  --  --  dry;itching;scaly    Color  white;pink  white;pink  --  --  white;pink    Care, Rash  --  --  --  --  open to air       Musculoskeletal    Musculoskeletal WDL  WDL  WDL  --  --  WDL       Functional Screen (every 3 days/change)    Ambulation  --  --  --  --  0 - independent     Transferring  --  --  --  --  0 - independent    Toileting  --  --  --  --  0 - independent    Bathing  --  --  --  --  0 - independent    Dressing  --  --  --  --  0 - independent    Eating  --  --  --  --  0 - independent    Communication  --  --  --  --  0 - understands/communicates without difficulty    Swallowing  --  --  --  --  0 - swallows foods/liquids without difficulty       Nutrition    Diet/Nutrition Received  regular  regular  --  --  regular    Fluid Intake  --  --  --  --  adequate       Peripheral IV 06/08/19 0400 Left Hand    IV Properties Placement Date: 06/08/19 Placement Time: 0400 Hand Hygiene Completed: Yes IV Change Due: 06/12/19 Size (Gauge): 20 G Orientation: Left Location: Hand Technique: Anatomical landmarks Inserted by: Lorena Miguel RN Total insertion attempts: 2 Patient Tolerance: Tolerated well    Site Assessment  --  --  --  Clean;Dry;Intact  --    Dressing Type  --  --  --  Transparent  --    Line Status  --  --  --  Blood return noted;Flushed;Saline locked  --    Dressing Status  --  --  --  Clean;Dry;Intact  --    Reason Not Rotated  --  --  --  Not due  --    Phlebitis  --  --  --  0-->no symptoms  --       Adult Sepsis Screening Tool    Previous adult screen positive?  --  no  --  --  yes: do not screen    Are 2 or > of the above criteria present?  --  no: STOP/negative screen  --  --  --       Safety    Safety WDL  WDL;safety factors  WDL;safety factors  WDL  --  WDL    Safety Factors  bed in low position;wheels locked;call light in reach;ID band on  bed in low position;wheels locked;call light in reach;ID band on  ID band on;upper side rails raised x 2;call light in reach;wheels locked;bed in low position  --  ID band on;upper side rails raised x 2;call light in reach;wheels locked;bed in low position    All Alarms  --  --  none present  --  none present       [facility] High Risk Falls Assessment (If Fall score is >/=13, add the Fall Risk CPG to the care plan)      Fallen in past 6 months  --  0--> No  --  --  0--> No    Mental Status  --  0--> no mental status change  --  --  0--> no mental status change    Elimination  --  0--> No elimination issues  --  --  0--> No elimination issues    Mobility  --  2--> Requires assistance- transfer, walker, etc.  --  --  0--> No mobility issues    Medications  --  0--> No meds  --  --  0--> No meds    Nurses' Clinical Judgement  --  3  --  --  3    Total Fall Risk Score  --  5  --  --  3       Safety Management    Safety Promotion/Fall Prevention  safety round/check completed;toileting scheduled  safety round/check completed;toileting scheduled  fall prevention program maintained;activity supervised;nonskid shoes/slippers when out of bed;safety round/check completed  --  activity supervised;fall prevention program maintained;nonskid shoes/slippers when out of bed;safety round/check completed    Medication Review/Management  --  --  --  --  medications reviewed    Environmental Safety Modification  --  --  assistive device/personal items within reach;clutter free environment maintained;lighting adjusted;room near unit station;room organization consistent  --  assistive device/personal items within reach;clutter free environment maintained;lighting adjusted;room near unit station;room organization consistent       Daily Care    Activity Management  --  --  activity adjusted per tolerance  --  activity adjusted per tolerance    Activity Assistance Provided  --  --  independent  --  independent    Highest level of mobility  --  --  --  --  7 --> Walked 25 feet or more    Additional Documentation  --  --  --  --  AM-PAC 6 clicks (Group);Highest Level of mobility (Row)       Positioning    Body Position  --  --  side-lying, right  --  supine    Head of Bed (HOB)  --  --  HOB at 30-45 degrees  --  HOB at 60-90 degrees    Positioning/Transfer Devices  --  --  pillows;in use  --  pillows;in use       How much help from another person do you  currently need...    Turning from your back to your side while in flat bed without using bedrails?  --  none  --  --  none    Moving from lying on back to sitting on the side of a flat bed without bedrails?  --  none  --  --  none    Moving to and from a bed to a chair (including a wheelchair)?  --  none  --  --  none    Standing up from a chair using your arms (e.g., wheelchair, bedside chair)?  --  none  --  --  none    Climbing 3-5 steps with a railing?  --  none  --  --  none    To walk in hospital room?  --  none  --  --  none    AM-PAC 6 Clicks Score  --  24  --  --  24

## 2019-06-09 LAB
ANION GAP SERPL CALCULATED.3IONS-SCNC: 10 MMOL/L
BACTERIA BLD CULT: ABNORMAL
BASOPHILS # BLD AUTO: 0.01 10*3/MM3 (ref 0–0.2)
BASOPHILS NFR BLD AUTO: 0.1 % (ref 0–1.5)
BUN BLD-MCNC: 11 MG/DL (ref 6–20)
BUN/CREAT SERPL: 22 (ref 7–25)
CALCIUM SPEC-SCNC: 9.2 MG/DL (ref 8.6–10.5)
CHLORIDE SERPL-SCNC: 105 MMOL/L (ref 98–107)
CO2 SERPL-SCNC: 25 MMOL/L (ref 22–29)
CREAT BLD-MCNC: 0.5 MG/DL (ref 0.57–1)
DEPRECATED RDW RBC AUTO: 41.6 FL (ref 37–54)
EOSINOPHIL # BLD AUTO: 0 10*3/MM3 (ref 0–0.4)
EOSINOPHIL NFR BLD AUTO: 0 % (ref 0.3–6.2)
ERYTHROCYTE [DISTWIDTH] IN BLOOD BY AUTOMATED COUNT: 15.4 % (ref 12.3–15.4)
GFR SERPL CREATININE-BSD FRML MDRD: >150 ML/MIN/1.73
GLUCOSE BLD-MCNC: 229 MG/DL (ref 65–99)
HCT VFR BLD AUTO: 44.1 % (ref 34–46.6)
HGB BLD-MCNC: 13.2 G/DL (ref 12–15.9)
IMM GRANULOCYTES # BLD AUTO: 0.25 10*3/MM3 (ref 0–0.05)
IMM GRANULOCYTES NFR BLD AUTO: 1.9 % (ref 0–0.5)
LYMPHOCYTES # BLD AUTO: 1.45 10*3/MM3 (ref 0.7–3.1)
LYMPHOCYTES NFR BLD AUTO: 11.2 % (ref 19.6–45.3)
MCH RBC QN AUTO: 22.8 PG (ref 26.6–33)
MCHC RBC AUTO-ENTMCNC: 29.9 G/DL (ref 31.5–35.7)
MCV RBC AUTO: 76.2 FL (ref 79–97)
MONOCYTES # BLD AUTO: 0.92 10*3/MM3 (ref 0.1–0.9)
MONOCYTES NFR BLD AUTO: 7.1 % (ref 5–12)
NEUTROPHILS # BLD AUTO: 10.26 10*3/MM3 (ref 1.7–7)
NEUTROPHILS NFR BLD AUTO: 79.7 % (ref 42.7–76)
NRBC BLD AUTO-RTO: 0 /100 WBC (ref 0–0.2)
PLATELET # BLD AUTO: 329 10*3/MM3 (ref 140–450)
PMV BLD AUTO: 11.7 FL (ref 6–12)
POTASSIUM BLD-SCNC: 4.1 MMOL/L (ref 3.5–5.2)
RBC # BLD AUTO: 5.79 10*6/MM3 (ref 3.77–5.28)
SODIUM BLD-SCNC: 140 MMOL/L (ref 136–145)
WBC NRBC COR # BLD: 12.89 10*3/MM3 (ref 3.4–10.8)

## 2019-06-09 PROCEDURE — 85025 COMPLETE CBC W/AUTO DIFF WBC: CPT | Performed by: INTERNAL MEDICINE

## 2019-06-09 PROCEDURE — 25010000002 VANCOMYCIN

## 2019-06-09 PROCEDURE — 25010000002 METHYLPREDNISOLONE PER 125 MG: Performed by: PHYSICIAN ASSISTANT

## 2019-06-09 PROCEDURE — 25010000002 VANCOMYCIN PER 500 MG

## 2019-06-09 PROCEDURE — 94799 UNLISTED PULMONARY SVC/PX: CPT

## 2019-06-09 PROCEDURE — 25010000002 HEPARIN (PORCINE) PER 1000 UNITS: Performed by: PHYSICIAN ASSISTANT

## 2019-06-09 PROCEDURE — 80048 BASIC METABOLIC PNL TOTAL CA: CPT | Performed by: INTERNAL MEDICINE

## 2019-06-09 PROCEDURE — 87040 BLOOD CULTURE FOR BACTERIA: CPT | Performed by: INTERNAL MEDICINE

## 2019-06-09 PROCEDURE — 99233 SBSQ HOSP IP/OBS HIGH 50: CPT | Performed by: INTERNAL MEDICINE

## 2019-06-09 RX ORDER — VANCOMYCIN HYDROCHLORIDE 1 G/200ML
10 INJECTION, SOLUTION INTRAVENOUS EVERY 8 HOURS SCHEDULED
Status: DISCONTINUED | OUTPATIENT
Start: 2019-06-09 | End: 2019-06-10

## 2019-06-09 RX ADMIN — IPRATROPIUM BROMIDE AND ALBUTEROL SULFATE 3 ML: 2.5; .5 SOLUTION RESPIRATORY (INHALATION) at 19:31

## 2019-06-09 RX ADMIN — METHYLPREDNISOLONE SODIUM SUCCINATE 60 MG: 125 INJECTION, POWDER, FOR SOLUTION INTRAMUSCULAR; INTRAVENOUS at 09:14

## 2019-06-09 RX ADMIN — ACETAMINOPHEN 650 MG: 325 TABLET ORAL at 19:55

## 2019-06-09 RX ADMIN — BUDESONIDE AND FORMOTEROL FUMARATE DIHYDRATE 2 PUFF: 160; 4.5 AEROSOL RESPIRATORY (INHALATION) at 07:17

## 2019-06-09 RX ADMIN — METHYLPREDNISOLONE SODIUM SUCCINATE 60 MG: 125 INJECTION, POWDER, FOR SOLUTION INTRAMUSCULAR; INTRAVENOUS at 00:04

## 2019-06-09 RX ADMIN — IPRATROPIUM BROMIDE AND ALBUTEROL SULFATE 3 ML: 2.5; .5 SOLUTION RESPIRATORY (INHALATION) at 07:17

## 2019-06-09 RX ADMIN — SODIUM CHLORIDE, PRESERVATIVE FREE 3 ML: 5 INJECTION INTRAVENOUS at 09:14

## 2019-06-09 RX ADMIN — BENZONATATE 200 MG: 100 CAPSULE ORAL at 14:29

## 2019-06-09 RX ADMIN — METHYLPREDNISOLONE SODIUM SUCCINATE 60 MG: 125 INJECTION, POWDER, FOR SOLUTION INTRAMUSCULAR; INTRAVENOUS at 16:21

## 2019-06-09 RX ADMIN — VANCOMYCIN HYDROCHLORIDE 2750 MG: 1 INJECTION, POWDER, LYOPHILIZED, FOR SOLUTION INTRAVENOUS at 09:15

## 2019-06-09 RX ADMIN — ACETAMINOPHEN 650 MG: 325 TABLET ORAL at 14:29

## 2019-06-09 RX ADMIN — HEPARIN SODIUM 5000 UNITS: 5000 INJECTION INTRAVENOUS; SUBCUTANEOUS at 06:08

## 2019-06-09 RX ADMIN — BUDESONIDE AND FORMOTEROL FUMARATE DIHYDRATE 2 PUFF: 160; 4.5 AEROSOL RESPIRATORY (INHALATION) at 19:31

## 2019-06-09 RX ADMIN — MONTELUKAST SODIUM 10 MG: 10 TABLET, COATED ORAL at 19:56

## 2019-06-09 RX ADMIN — IPRATROPIUM BROMIDE AND ALBUTEROL SULFATE 3 ML: 2.5; .5 SOLUTION RESPIRATORY (INHALATION) at 00:49

## 2019-06-09 RX ADMIN — BENZONATATE 200 MG: 100 CAPSULE ORAL at 00:04

## 2019-06-09 RX ADMIN — ACETAMINOPHEN 650 MG: 325 TABLET ORAL at 09:20

## 2019-06-09 RX ADMIN — ACETAMINOPHEN 650 MG: 325 TABLET ORAL at 03:42

## 2019-06-09 RX ADMIN — IPRATROPIUM BROMIDE AND ALBUTEROL SULFATE 3 ML: 2.5; .5 SOLUTION RESPIRATORY (INHALATION) at 13:45

## 2019-06-09 RX ADMIN — HEPARIN SODIUM 5000 UNITS: 5000 INJECTION INTRAVENOUS; SUBCUTANEOUS at 14:29

## 2019-06-09 RX ADMIN — BENZONATATE 200 MG: 100 CAPSULE ORAL at 06:11

## 2019-06-09 RX ADMIN — ACETAMINOPHEN 650 MG: 325 TABLET ORAL at 00:03

## 2019-06-09 RX ADMIN — HEPARIN SODIUM 5000 UNITS: 5000 INJECTION INTRAVENOUS; SUBCUTANEOUS at 19:56

## 2019-06-09 RX ADMIN — VANCOMYCIN HYDROCHLORIDE 1000 MG: 1 INJECTION, SOLUTION INTRAVENOUS at 16:22

## 2019-06-09 NOTE — PLAN OF CARE
Problem: Patient Care Overview  Goal: Plan of Care Review  Outcome: Ongoing (interventions implemented as appropriate)   06/09/19 0640   Coping/Psychosocial   Plan of Care Reviewed With patient   Plan of Care Review   Progress improving   OTHER   Outcome Summary Resting in bed. VSS. ST. 3L NC. C/o of generalized abdominal, headaches from persistant cough. Prn tessalon pearls administered with tylenol. IV steriods. Breathing tx. Plan of care reviewed with pt. Verbalized understanding. Voiced no questions or concerns at this time.       Problem: ARDS (Acute Resp Distress Syndrome) (Adult)  Goal: Signs and Symptoms of Listed Potential Problems Will be Absent, Minimized or Managed (ARDS)  Outcome: Ongoing (interventions implemented as appropriate)   06/09/19 0640   Goal/Outcome Evaluation   Problems Assessed (Acute Respiratory Distress Syndrome) all   Problems Present (ARDS) hypoxia/hypoxemia;situational response

## 2019-06-09 NOTE — PROGRESS NOTES
[facility] Medicine Services  PROGRESS NOTE    Patient Name: Yoli Regan  : 1995  MRN: [id number]    Date of Admission: 2019  Length of Stay: 1  Primary Care Physician: Provider, No Known    Subjective   Subjective     CC:  SOB, severe cough    HPI:  Resting in bed in no acute distress and feels better than yesterday.  However, still has vigorous cough which causes subcostal pleuritic pain and also back pain.  Denies any fever or chills.  No chest pain or palpitation.  No nausea, vomiting, diarrhea, abdominal pain.    Review of Systems      Otherwise ROS is negative except as mentioned in the HPI.    Objective   Objective     Vital Signs:   Temp:  [97.9 °F (36.6 °C)-98.6 °F (37 °C)] 97.9 °F (36.6 °C)  Heart Rate:  [] 91  Resp:  [16-22] 18  BP: (117-125)/(68-81) 117/75        Physical Exam:  Constitutional: Awake, alert  Eyes: PERRLA, sclerae anicteric, no conjunctival injection  HENT: NCAT, mucous membranes moist  Neck: Supple, no thyromegaly, no lymphadenopathy, trachea midline  Respiratory: Clear to auscultation bilaterally, severe cough on deep inspiration, nonlabored respirations   Cardiovascular: RRR, no murmurs, rubs, or gallops, palpable pedal pulses bilaterally  Gastrointestinal: Obese abdomen.  Positive bowel sounds, soft, nontender, nondistended  Musculoskeletal: Morbidly obese.  No bilateral ankle edema, no clubbing or cyanosis to extremities  Psychiatric: Appropriate affect, cooperative  Neurologic: Oriented x 3, strength symmetric in all extremities, Cranial Nerves grossly intact to confrontation, speech clear  Skin: No rashes    Results Reviewed:  I have personally reviewed current lab, radiology, and data and agree.    Results from last 7 days   Lab Units 19  0802 19  0609 19  0005   WBC 10*3/mm3 12.89* 6.72 8.11   HEMOGLOBIN g/dL 13.2 13.6 14.2   HEMATOCRIT % 44.1 45.2 46.8*   PLATELETS 10*3/mm3 329 311 316     Results from last 7 days    Lab Units 06/09/19  0802 06/08/19  0609 06/08/19  0006   SODIUM mmol/L 140 141 140   POTASSIUM mmol/L 4.1 4.5 3.8   CHLORIDE mmol/L 105 104 104   CO2 mmol/L 25.0 24.0 22.0   BUN mg/dL 11 8 6   CREATININE mg/dL 0.50* 0.74 0.66   GLUCOSE mg/dL 229* 208* 94   CALCIUM mg/dL 9.2 8.7 9.2   ALT (SGPT) U/L  --   --  35*   AST (SGOT) U/L  --   --  35*     Estimated Creatinine Clearance: 194.8 mL/min (A) (by C-G formula based on SCr of 0.5 mg/dL (L)).    Microbiology Results Abnormal     Procedure Component Value - Date/Time    Blood Culture ID, PCR - Blood, Arm, Left [[id number]]  (Abnormal) Collected:  06/08/19 0023    Lab Status:  Final result Specimen:  Blood from Arm, Left Updated:  06/09/19 0839     BCID, PCR Staphylococcus spp, not aureus. Identification by BCID PCR.    Blood Culture - Blood, Arm, Left [[id number]]  (Abnormal) Collected:  06/08/19 0023    Lab Status:  Preliminary result Specimen:  Blood from Arm, Left Updated:  06/09/19 0725     Blood Culture Abnormal Stain     Gram Stain Anaerobic Bottle Gram positive cocci in pairs and clusters    Blood Culture - Blood, Hand, Right [[id number]] Collected:  06/08/19 0029    Lab Status:  Preliminary result Specimen:  Blood from Hand, Right Updated:  06/09/19 0516     Blood Culture No growth at 24 hours          Imaging Results (last 24 hours)     ** No results found for the last 24 hours. **               I have reviewed the medications:  Scheduled Meds:  budesonide-formoterol 2 puff Inhalation BID - RT   heparin (porcine) 5,000 Units Subcutaneous Q8H   ipratropium-albuterol 3 mL Nebulization Q6H - RT   methylPREDNISolone sodium succinate 60 mg Intravenous Q8H   montelukast 10 mg Oral Nightly   sodium chloride 3 mL Intravenous Q12H   vancomycin 10 mg/kg Intravenous Q8H     Continuous Infusions:  Pharmacy to dose vancomycin      PRN Meds:.•  acetaminophen  •  benzonatate  •  melatonin  •  Pharmacy to dose vancomycin  •  sodium chloride  •  sodium  chloride      Assessment/Plan   Assessment / Plan     Active Hospital Problems    Diagnosis POA   • **Acute respiratory failure with hypoxia (CMS/[facility]) [J96.01] Yes   • Asthma exacerbation [J45.901] Yes   • Environmental allergies [Z91.09] Yes   • Severe asthma with status asthmaticus [J45.902] Yes          Brief Hospital Course to date:  Yoli Regan is a 24 y.o. female with past medical history of asthma, morbid obesity.  Patient has been treated for respiratory symptoms including cough and severe shortness of breath with antibiotics recently.  As symptoms got worse patient was brought to the emergency room here at [facility].    Assessment and plan:  *Severe asthma exacerbation with expiratory wheezing and severe cough.  Patient also may have upper respiratory tract viral infection.  Improving with Solu-Medrol and neb treatment.  Patient required 4 L of oxygen but today she has improved and is saturating around 90% on room air.    *Acute hypoxic respiratory failure secondary to asthma exacerbation, improving.    *1 set of blood culture tending positive for gram-positive cocci.  Quite possibly this is contamination however patient was started on vancomycin.    *Morbid obesity.    PLAN:  -Start on vancomycin and follow-up blood cultures.  Also get a new set of blood cultures.  -Ambulate  -Continue current medication.    DVT Prophylaxis:      Disposition: I expect the patient to be discharged home in a few days.    CODE STATUS:   Code Status and Medical Interventions:   Ordered at: 06/08/19 0145     Level Of Support Discussed With:    Patient     Code Status:    CPR     Medical Interventions (Level of Support Prior to Arrest):    Full         Electronically signed by Jason Pete MD, 06/09/19, 1:42 PM.

## 2019-06-09 NOTE — PROGRESS NOTES
"Pharmacy Consult-Vancomycin Dosing  Yoli Regan is a  24 y.o. female receiving vancomycin therapy.     Indication: r/o Bacteremia  Consulting Provider: Dr Pete  ID Consult: no  Goal Trough: 15-20    Current Antimicrobial Therapy  Anti-Infectives (From admission, onward)      Ordered     Dose/Rate Route Frequency Start Stop    06/09/19 0749  vancomycin (VANCOCIN) in iso-osmotic dextrose IVPB 1 g (premix) 200 mL     Ordering Provider:  Alysha Claros, RPH    10 mg/kg × 108 kg Intravenous Every 8 Hours Scheduled 06/09/19 1700 06/23/19 1559    06/09/19 0746  vancomycin 2750 mg/500 mL 0.9% NS IVPB ([facility])     Ordering Provider:  Alysha Claros RPH    25 mg/kg × 108 kg  over 210 Minutes Intravenous Once 06/09/19 0845      06/09/19 0740  Pharmacy to dose vancomycin     Ordering Provider:  Jason Pete MD     Does not apply Continuous PRN 06/09/19 0740 06/16/19 0739    06/08/19 0129  azithromycin 500 MG/250 ML 0.9% NS IVPB ([facility])     Ordering Provider:  Jose G Roberson MD    500 mg  over 60 Minutes Intravenous Once 06/08/19 0131 06/08/19 0309          Allergies  Allergies as of 06/07/2019   • (No Known Allergies)     Labs  Results from last 7 days   Lab Units 06/08/19  0609 06/08/19  0006   BUN mg/dL 8 6   CREATININE mg/dL 0.74 0.66   Serum creatinine: 0.74 mg/dL 06/08/19 0609  Estimated creatinine clearance: 131.6 mL/min  Results from last 7 days   Lab Units 06/08/19  0609 06/08/19  0005   WBC 10*3/mm3 6.72 8.11     Evaluation of Dosing     Last Dose Received in the ED/Outside Facility: no  Is Patient on Dialysis or Renal Replacement: no    Ht - 152.4 cm (60\")  Wt - 108 kg (238 lb)    Estimated Creatinine Clearance: 131.6 mL/min (by C-G formula based on SCr of 0.74 mg/dL).    I/O last 3 completed shifts:  In: 960 [P.O.:960]  Out: 500 [Urine:500] + UNMEASURED    Microbiology and Radiology  Microbiology Results (last 10 days)       Procedure Component Value - Date/Time    Blood Culture - Blood, Hand, Right " [[id number]] Collected:  06/08/19 0029    Lab Status:  Preliminary result Specimen:  Blood from Hand, Right Updated:  06/09/19 0516     Blood Culture No growth at 24 hours    Blood Culture - Blood, Arm, Left [[id number]]  (Abnormal) Collected:  06/08/19 0023    Lab Status:  Preliminary result Specimen:  Blood from Arm, Left Updated:  06/09/19 0725     Blood Culture Abnormal Stain     Gram Stain Anaerobic Bottle Gram positive cocci in pairs and clusters          Evaluation of Level    6/10 0700     Assessment/Plan:    1. Pharmacy consulted to dose vancomycin for possible bacteremia.        Dw Dr. Pete, will cover for now, r/o contaminant  2. Recommend 25 mg/kg IV load, then 10 mg/kg IV q8h  3. Check vancomycin trough 6/10 at 0700 prior to 4th dose  4. Pharmacy will continue to follow and adjust dose based on renal function, drug levels, and clinical status.      Thank you for consult,  Alysha Claros, PharmD

## 2019-06-09 NOTE — PLAN OF CARE
Problem: Patient Care Overview  Goal: Plan of Care Review  Outcome: Ongoing (interventions implemented as appropriate)   06/09/19 1520   Coping/Psychosocial   Plan of Care Reviewed With patient   Plan of Care Review   Progress improving   OTHER   Outcome Summary O2 weaned from Pt, tolerating RA with sats >90%. Pt reports some abdominal and back soreness thought to be d/t frequent coughing with headaches.Tylenol given with Tessalon pearls. Pt also complains of some fatigue with activity. VSS. PT receiving IV abx or IV steroids. Will cont to monitor.

## 2019-06-10 LAB
BACTERIA SPEC AEROBE CULT: ABNORMAL
GRAM STN SPEC: ABNORMAL
VANCOMYCIN TROUGH SERPL-MCNC: 9 MCG/ML (ref 5–20)

## 2019-06-10 PROCEDURE — 25010000002 METHYLPREDNISOLONE PER 125 MG: Performed by: PHYSICIAN ASSISTANT

## 2019-06-10 PROCEDURE — 99233 SBSQ HOSP IP/OBS HIGH 50: CPT | Performed by: INTERNAL MEDICINE

## 2019-06-10 PROCEDURE — 80202 ASSAY OF VANCOMYCIN: CPT

## 2019-06-10 PROCEDURE — 25010000002 VANCOMYCIN 10 G RECONSTITUTED SOLUTION

## 2019-06-10 PROCEDURE — 25010000002 HEPARIN (PORCINE) PER 1000 UNITS: Performed by: PHYSICIAN ASSISTANT

## 2019-06-10 PROCEDURE — 25010000002 VANCOMYCIN PER 500 MG

## 2019-06-10 PROCEDURE — 94799 UNLISTED PULMONARY SVC/PX: CPT

## 2019-06-10 RX ORDER — CALCIUM CARBONATE 200(500)MG
2 TABLET,CHEWABLE ORAL 3 TIMES DAILY PRN
Status: DISCONTINUED | OUTPATIENT
Start: 2019-06-10 | End: 2019-06-12 | Stop reason: HOSPADM

## 2019-06-10 RX ADMIN — ACETAMINOPHEN 650 MG: 325 TABLET ORAL at 08:57

## 2019-06-10 RX ADMIN — IPRATROPIUM BROMIDE AND ALBUTEROL SULFATE 3 ML: 2.5; .5 SOLUTION RESPIRATORY (INHALATION) at 18:58

## 2019-06-10 RX ADMIN — METHYLPREDNISOLONE SODIUM SUCCINATE 60 MG: 125 INJECTION, POWDER, FOR SOLUTION INTRAMUSCULAR; INTRAVENOUS at 08:56

## 2019-06-10 RX ADMIN — BUDESONIDE AND FORMOTEROL FUMARATE DIHYDRATE 2 PUFF: 160; 4.5 AEROSOL RESPIRATORY (INHALATION) at 18:58

## 2019-06-10 RX ADMIN — HEPARIN SODIUM 5000 UNITS: 5000 INJECTION INTRAVENOUS; SUBCUTANEOUS at 13:10

## 2019-06-10 RX ADMIN — BENZONATATE 200 MG: 100 CAPSULE ORAL at 21:02

## 2019-06-10 RX ADMIN — IPRATROPIUM BROMIDE AND ALBUTEROL SULFATE 3 ML: 2.5; .5 SOLUTION RESPIRATORY (INHALATION) at 13:26

## 2019-06-10 RX ADMIN — METHYLPREDNISOLONE SODIUM SUCCINATE 60 MG: 125 INJECTION, POWDER, FOR SOLUTION INTRAMUSCULAR; INTRAVENOUS at 15:44

## 2019-06-10 RX ADMIN — VANCOMYCIN HYDROCHLORIDE 1000 MG: 1 INJECTION, SOLUTION INTRAVENOUS at 09:54

## 2019-06-10 RX ADMIN — IPRATROPIUM BROMIDE AND ALBUTEROL SULFATE 3 ML: 2.5; .5 SOLUTION RESPIRATORY (INHALATION) at 07:09

## 2019-06-10 RX ADMIN — ACETAMINOPHEN 650 MG: 325 TABLET ORAL at 21:02

## 2019-06-10 RX ADMIN — BENZONATATE 200 MG: 100 CAPSULE ORAL at 00:30

## 2019-06-10 RX ADMIN — HEPARIN SODIUM 5000 UNITS: 5000 INJECTION INTRAVENOUS; SUBCUTANEOUS at 21:02

## 2019-06-10 RX ADMIN — BUDESONIDE AND FORMOTEROL FUMARATE DIHYDRATE 2 PUFF: 160; 4.5 AEROSOL RESPIRATORY (INHALATION) at 07:19

## 2019-06-10 RX ADMIN — MONTELUKAST SODIUM 10 MG: 10 TABLET, COATED ORAL at 21:02

## 2019-06-10 RX ADMIN — METHYLPREDNISOLONE SODIUM SUCCINATE 60 MG: 125 INJECTION, POWDER, FOR SOLUTION INTRAMUSCULAR; INTRAVENOUS at 00:29

## 2019-06-10 RX ADMIN — VANCOMYCIN HYDROCHLORIDE 1250 MG: 10 INJECTION, POWDER, LYOPHILIZED, FOR SOLUTION INTRAVENOUS at 23:50

## 2019-06-10 RX ADMIN — SODIUM CHLORIDE, PRESERVATIVE FREE 3 ML: 5 INJECTION INTRAVENOUS at 08:57

## 2019-06-10 RX ADMIN — VANCOMYCIN HYDROCHLORIDE 1250 MG: 10 INJECTION, POWDER, LYOPHILIZED, FOR SOLUTION INTRAVENOUS at 15:45

## 2019-06-10 RX ADMIN — MELATONIN TAB 5 MG 5 MG: 5 TAB at 00:30

## 2019-06-10 RX ADMIN — MELATONIN TAB 5 MG 5 MG: 5 TAB at 21:02

## 2019-06-10 RX ADMIN — HEPARIN SODIUM 5000 UNITS: 5000 INJECTION INTRAVENOUS; SUBCUTANEOUS at 07:04

## 2019-06-10 RX ADMIN — IPRATROPIUM BROMIDE AND ALBUTEROL SULFATE 3 ML: 2.5; .5 SOLUTION RESPIRATORY (INHALATION) at 01:59

## 2019-06-10 RX ADMIN — Medication: at 08:58

## 2019-06-10 RX ADMIN — IPRATROPIUM BROMIDE AND ALBUTEROL SULFATE 3 ML: 2.5; .5 SOLUTION RESPIRATORY (INHALATION) at 23:59

## 2019-06-10 RX ADMIN — VANCOMYCIN HYDROCHLORIDE 1000 MG: 1 INJECTION, SOLUTION INTRAVENOUS at 00:30

## 2019-06-10 NOTE — PROGRESS NOTES
"Pharmacy Consult-Vancomycin Dosing  Yoli Regan is a  24 y.o. female receiving vancomycin therapy.     Indication: r/o Bacteremia  Consulting Provider: Dr Pete  ID Consult: no  Goal Trough: 15-20    Current Antimicrobial Therapy  Anti-Infectives (From admission, onward)      Ordered     Dose/Rate Route Frequency Start Stop    06/10/19 1132  vancomycin 1250 mg/250 mL 0.9% NS IVPB ([facility])     Ordering Provider:  Ruth Roach RPH    1,250 mg  over 90 Minutes Intravenous Every 8 Hours Scheduled 06/10/19 1600 06/23/19 1559    06/09/19 0746  vancomycin 2750 mg/500 mL 0.9% NS IVPB ([facility])     Ordering Provider:  Alysha Claros RPH    25 mg/kg × 108 kg  over 210 Minutes Intravenous Once 06/09/19 0845 06/09/19 1245    06/09/19 0740  Pharmacy to dose vancomycin     Ordering Provider:  Jason Pete MD     Does not apply Continuous PRN 06/09/19 0740 06/16/19 0739    06/08/19 0129  azithromycin 500 MG/250 ML 0.9% NS IVPB ([facility])     Ordering Provider:  Jose G Roberson MD    500 mg  over 60 Minutes Intravenous Once 06/08/19 0131 06/08/19 0309          Allergies  Allergies as of 06/07/2019   • (No Known Allergies)     Labs  Results from last 7 days   Lab Units 06/09/19  0802 06/08/19  0609 06/08/19  0006   BUN mg/dL 11 8 6   CREATININE mg/dL 0.50* 0.74 0.66   Serum creatinine: 0.5 mg/dL (L) 06/09/19 0802  Estimated creatinine clearance: 194.8 mL/min (A)  Results from last 7 days   Lab Units 06/09/19  0802 06/08/19  0609 06/08/19  0005   WBC 10*3/mm3 12.89* 6.72 8.11     Evaluation of Dosing     Last Dose Received in the ED/Outside Facility: no  Is Patient on Dialysis or Renal Replacement: no    Ht - 152.4 cm (60\")  Wt - 108 kg (238 lb)    Estimated Creatinine Clearance: 194.8 mL/min (A) (by C-G formula based on SCr of 0.5 mg/dL (L)).    I/O last 3 completed shifts:  In: 1380 [P.O.:480; IV Piggyback:900]  Out: 500 [Urine:500] + UNMEASURED    Microbiology and Radiology  Microbiology Results (last 10 days)       " Procedure Component Value - Date/Time    Blood Culture - Blood, Wrist, Left [[id number]] Collected:  06/09/19 0807    Lab Status:  Preliminary result Specimen:  Blood from Wrist, Left Updated:  06/10/19 0831     Blood Culture No growth at 24 hours    Blood Culture - Blood, Arm, Left [[id number]] Collected:  06/09/19 0802    Lab Status:  Preliminary result Specimen:  Blood from Arm, Left Updated:  06/10/19 0831     Blood Culture No growth at 24 hours    Blood Culture - Blood, Hand, Right [[id number]] Collected:  06/08/19 0029    Lab Status:  Preliminary result Specimen:  Blood from Hand, Right Updated:  06/10/19 0516     Blood Culture No growth at 2 days    Blood Culture - Blood, Arm, Left [[id number]]  (Abnormal) Collected:  06/08/19 0023    Lab Status:  Final result Specimen:  Blood from Arm, Left Updated:  06/10/19 1023     Blood Culture Staphylococcus, coagulase negative     Comment: Probable contaminant requires clinical correlation, susceptibility not performed unless requested by physician.          Gram Stain Anaerobic Bottle Gram positive cocci in pairs and clusters    Blood Culture ID, PCR - Blood, Arm, Left [[id number]]  (Abnormal) Collected:  06/08/19 0023    Lab Status:  Final result Specimen:  Blood from Arm, Left Updated:  06/09/19 0839     BCID, PCR Staphylococcus spp, not aureus. Identification by BCID PCR.          Evaluation of Level    Lab Results   Component Value Date    [facility] 9.00 06/10/2019        Assessment/Plan:    1. Pharmacy consulted to dose vancomycin for possible bacteremia, goal trough 15-20mcg/mL.        D/w Dr. Pete, will continue to cover for now, r/o contaminant  2. Vancomycin trough this morning prior the 4th dose was 9mcg/mL at 0859, previous dose administered at 0030. ~9 hour level  3. Will increase to Vancomycin 1250mg IV q8h, expect true trough to be slightly higher than 9mcg/mL as trough was drawn late and steady state has not yet been achieved. If vancomycin continued,  consider assessing a trough on 6/12.  4. Pharmacy will continue to follow and adjust dose based on renal function, drug levels, and clinical status.      Thank you for consult,    Ruth Roach, PharmD  6/10/2019  11:38 AM

## 2019-06-10 NOTE — PLAN OF CARE
Problem: Patient Care Overview  Goal: Plan of Care Review  Outcome: Ongoing (interventions implemented as appropriate)   06/10/19 1581   Coping/Psychosocial   Plan of Care Reviewed With patient   Plan of Care Review   Progress improving   OTHER   Outcome Summary Pt reports some improvement in breathing and abdominal/back soreness. Pt reports still feeling some fatigue and SOA with walking to the BR. O2 removed this AM and during the day and while sleeping in between care pt's O2 Sats have been >90% other VSS. Tylenol given PRN for soreness d/t to coughing. IV steroids switched to PO. Will continue to monitor.        Problem: Asthma (Adult)  Goal: Signs and Symptoms of Listed Potential Problems Will be Absent, Minimized or Managed (Asthma)  Outcome: Ongoing (interventions implemented as appropriate)

## 2019-06-10 NOTE — PAYOR COMM NOTE
"Sandra Ryan RN  Utilization Review  P: [phone]  F: [phone]    Pending inpatient auth # [id number]  Updated clinicals     Yoli Regan (24 y.o. Female)     Date of Birth Social Security Number Address Home Phone MRN    1995  [id number] [facility] [phone] [id number]    Shinto Marital Status          Gnosticism Single       Admission Date Admission Type Admitting Provider Attending Provider Department, Room/Bed    6/7/19 Emergency Denzel Riddle MD [place], [place], MD [facility] 4G, S456/1    Discharge Date Discharge Disposition Discharge Destination                       Attending Provider:  Jason Pete MD    Allergies:  No Known Allergies    Isolation:  None   Infection:  None   Code Status:  CPR    Ht:  152.4 cm (60\")   Wt:  108 kg (238 lb)    Admission Cmt:  None   Principal Problem:  Acute respiratory failure with hypoxia (CMS/HCC) [J96.01]                 Active Insurance as of 6/7/2019     Primary Coverage     Payor Plan Insurance Group Employer/Plan Group    [employer] [place] [employer] [place]ICA[place]      Payor Plan Address Payor Plan Phone Number Payor Plan Fax Number Effective Dates    [address] [address] [phone]  6/7/2019 - None Entered    [facility] [id number]       Subscriber Name Subscriber Birth Date Member ID       YOLI REGAN 1995 [id number]                 Emergency Contacts      (Rel.) Home Phone Work Phone Mobile Phone    Rahat Regan (Father) [phone] -- --               History & Physical      Denzel Riddle MD at 6/8/2019  2:08 AM          RIDDLE/ Medicine History and Physical    Primary Care Physician: Provider, No Known    CHIEF COMPLAIN :    Dyspnea.    History of Present Illness  24-year-old obese female presented to ED with a chief complaint of dyspnea/cough-going on for past 3 weeks.  Approximately 2 weeks back patient went to [facility]- was treated for asthma exacerbation with steroids/antibiotics- in " spite of that she continued to have symptoms, 2 days back she went to [facility]-better she tested positive for rhinovirus/parainfluenza virus, as per patient CT PE was done-which was negative.  Again she was diagnosed with asthma exacerbation, was treated symptomatically and sent home.  In spite of continued use of her nebulizer, cough syrup, Dulera- she had worsening of dyspnea now at rest, associated with cough/clear sputum and chest pain whenever she tries to take deep breath sharp in nature.  No fever, but felt warm.  Did complain of nausea but no vomiting.  Patient does work in the hospital as .  States over last 3 years has poorly controlled asthma-last admission was in 2018 at  for 2 weeks-never intubated.  Asthma has been managed by primary care.    Review of Systems   Complete ROS done, which is negative except as above and HPI.  Old records reviewed and summarized in PM hx         Past Medical History:   Diagnosis Date   • Asthma-since childhood        Past Surgical History:   Procedure Laterality Date   • CHOLECYSTECTOMY         Family History:   Reviewed with patient-no family history of CAD.    Social History:    reports that she has never smoked. She has never used smokeless tobacco. She reports that she does not drink alcohol or use drugs.    Medications:    (Not in a hospital admission)  No Known Allergies      PHYSICAL EXAM :    Vitals:    06/08/19 0200   BP: 127/74   Pulse: 96   Resp:  22   Temp:  Afebrile   SpO2: 98% on 2 L nasal cannula.     VITALS-   AS ABOVE.  GENERAL- Not distressed, well nourished, obese.  RS-decreased air entry at the bases, could not take a deep breath will start coughing.  CVS- s1s2 Regular, tachycardic, no murmur.  ABD- soft, non tender, distended, no organomegaly. BS good.  EXT- no edema. Pulses + .  NEURO- AAO-3, power 5/5 in all ext, no gross sensory deficit, cranial nerves intact.  EYES- Conjunctivae are normal. Pupils are equal, round, and reactive to  light. No scleral icterus.   ENT- no external ear nose lesions, mucosa moist.  NECK-  No tracheal deviation present. No thyromegaly present,No cervical lymphadenopathy.  JOINTS/MSK- no deformity, no swelling.  SKIN- no rash , warm to touch.  PSYCHIATRIC-  has a normal mood and affect.Thought content normal.           RESULTS REVIEWED :   Data.    Results from last 7 days   Lab Units 06/08/19  0005   WBC 10*3/mm3 8.11   HEMOGLOBIN g/dL 14.2   HEMATOCRIT % 46.8*   PLATELETS 10*3/mm3 316     Results from last 7 days   Lab Units 06/08/19  0006   SODIUM mmol/L 140   POTASSIUM mmol/L 3.8   CHLORIDE mmol/L 104   CO2 mmol/L 22.0   BUN mg/dL 6   CREATININE mg/dL 0.66   GLUCOSE mg/dL 94   CALCIUM mg/dL 9.2   ALT (SGPT) U/L 35*   AST (SGOT) U/L 35*       Brief Urine Lab Results     None          pH, Arterial   Date Value Ref Range Status   06/08/2019 7.465 (H) 7.350 - 7.450 pH units Final     Comment:     83 Value above reference range     ABG-7.46/PCO2 of 31/PO2 of 70% on 2 L FiO2.  Ekg-sinus rhythm at 106 bpm, QTC of 456, Q waves in lead III with T wave inversion.  Cx-?  Obscuring of right heart border atelectasis versus pneumonia  WBC- 8, hemoglobin of 14, neutrophils of 63, platelet of 316.  D-dimer of 0.55, lactic acid of 1.1, BUN/creatinine 6/0.6.  I have personally reviewed current lab, radiology, and ekg .      Active Hospital Problems    Diagnosis POA   • **Acute respiratory failure with hypoxia (CMS/[facility]) [J96.01] Yes   • Asthma exacerbation [J45.901] Yes   • Environmental allergies [Z91.09] Yes         Assessment / Plan     Asthma exacerbation with hypoxic respiratory failure  -Continued albuterol nebulizer every 6 hourly, IV Solu-Medrol, for now we will continue Rocephin/Zithromax probably can taper the antibiotics soon.  - Peak expiratory flow measurements.  -May need pulmonary outpatient appointment upon discharge.  -We will get records from recent [facility] work-up.    DVT PXL  -michael's/scds  -lovenox-not  initiated.      I discussed the patients findings and my recommendations with: patient/family.    I believe this patient meets INPATIENT status due to the need for care which can only be reasonably provided in an hospital setting such as aggressive/expedited ancillary services and/or consultation services, the necessity for IV medications, close physician monitoring and/or the possible need for procedures.  In such, I feel patient’s risk for adverse outcomes and need for care warrant INPATIENT evaluation and predict the patient’s care encounter to likely last beyond 2 midnights.        Denzel Riddle MD  06/08/19  2:08 AM            Electronically signed by Denzel Riddle MD at 6/8/2019  2:24 AM       ICU Vital Signs     Row Name 06/10/19 0856 06/10/19 0719 06/10/19 0709 06/10/19 0600 06/10/19 0400       Vitals    Temp  98.1 °F (36.7 °C)  --  --  --  --    Temp src  Oral  --  --  --  --    Pulse  112  69  70  --  --    Heart Rate Source  Monitor  Monitor  Monitor  --  --    Resp  18  16  16  --  --    Resp Rate Source  Visual  Visual  Visual  --  --    BP  124/56  --  --  --  --    Noninvasive MAP (mmHg)  74  --  --  --  --    BP Location  Left arm  --  --  --  --    BP Method  Automatic  --  --  --  --    Patient Position  Lying  --  --  --  --       Oxygen Therapy    SpO2  90 %  97 %  97 %  --  --    Device (Oxygen Therapy)  room air  room air  room air  room air  room air    Row Name 06/10/19 0000 06/09/19 2200 06/09/19 2000 06/09/19 1931 06/09/19 1600       Vitals    Pulse  --  --  --  103  --    Heart Rate Source  --  --  --  Monitor  --    Resp  --  --  --  16  --    Resp Rate Source  --  --  --  Visual  --       Oxygen Therapy    SpO2  --  --  --  92 %  --    Device (Oxygen Therapy)  room air  room air  room air  room air  room air    Row Name 06/09/19 1429 06/09/19 1345 06/09/19 1200 06/09/19 1000 06/09/19 0916       Vitals    Temp  --  --  --  --  97.9 °F (36.6 °C)    Temp src  --  --  --  --  Oral     Pulse  --  89  --  --  91    Heart Rate Source  --  Monitor  --  --  Monitor    Resp  --  18  --  --  18    Resp Rate Source  --  Visual  --  --  Visual    BP  --  --  --  --  117/75    Noninvasive MAP (mmHg)  --  --  --  --  90    BP Location  --  --  --  --  Left arm    BP Method  --  --  --  --  Automatic    Patient Position  --  --  --  --  Lying       Oxygen Therapy    SpO2  --  93 %  --  --  91 %    Pulse Oximetry Type  --  Continuous  --  --  Continuous    Device (Oxygen Therapy)  room air  room air  room air  room air  room air    Row Name 06/09/19 0800 06/09/19 0717 06/09/19 0610 06/09/19 0430 06/09/19 0409       Vitals    Temp  --  --  --  --  98.1 °F (36.7 °C)    Temp src  --  --  --  --  Oral    Pulse  --  77  --  --  79    Heart Rate Source  --  Monitor  --  --  Monitor    Resp  --  18  --  --  18    Resp Rate Source  --  Visual  --  --  Visual    BP  --  --  --  --  117/68    BP Location  --  --  --  --  Left arm    BP Method  --  --  --  --  Automatic    Patient Position  --  --  --  --  Lying       Oxygen Therapy    SpO2  --  94 %  --  --  --    Pulse Oximetry Type  --  Continuous  --  --  --    Device (Oxygen Therapy)  room air  humidified;nasal cannula  humidified;nasal cannula  humidified;nasal cannula  --    Flow (L/min)  --  3  3  3  --    Row Name 06/09/19 0200 06/09/19 0049 06/09/19 0003 06/08/19 2200 06/08/19 2030       Vitals    Pulse  --  75  --  --  --    Resp  --  18  --  --  --       Oxygen Therapy    SpO2  --  92 %  --  --  --    Device (Oxygen Therapy)  humidified;nasal cannula  humidified;nasal cannula  humidified;nasal cannula  humidified;nasal cannula  humidified;nasal cannula    Flow (L/min)  3  3  3  3  3    Row Name 06/08/19 1933 06/08/19 1905 06/08/19 1623 06/08/19 1348 06/08/19 1121       Vitals    Temp  --  --  98.6 °F (37 °C)  --  98 °F (36.7 °C)    Temp src  --  --  Oral  --  Oral    Pulse  --  117  114  102  98    Heart Rate Source  --  --  Monitor  Monitor  Monitor    Resp  " --  22 18  16  18    Resp Rate Source  --  --  Visual  Visual  Visual    BP  --  --  125/81  --  118/69    BP Location  --  --  Left arm  --  Right arm    BP Method  --  --  Automatic  --  Automatic    Patient Position  --  --  Sitting  --  Lying       Oxygen Therapy    SpO2  --  93 %  94 %  --  92 %    Device (Oxygen Therapy)  humidified;nasal cannula  humidified;nasal cannula  --  humidified;nasal cannula  --    Flow (L/min)  3  3  --  --  --    Row Name 06/08/19 0800 06/08/19 0705 06/08/19 0600 06/08/19 0523 06/08/19 0319       Height and Weight    Height  --  --  --  --  152.4 cm (60\")    Height Method  --  --  --  --  Stated    Weight  --  --  --  --  108 kg (238 lb)    Weight Method  --  --  --  --  Standing scale    Ideal Body Weight (IBW) (kg)  --  --  --  --  45.86    BSA (Calculated - sq m)  --  --  --  --  2.01 sq meters    BMI (Calculated)  --  --  --  --  46.5    Weight in (lb) to have BMI = 25  --  --  --  --  127.7       Vitals    Temp  --  --  --  --  98.2 °F (36.8 °C)    Temp src  --  --  --  --  Oral    Pulse  --  88  --  --  109    Heart Rate Source  --  Monitor  --  --  Monitor    Resp  --  18  --  --  20    Resp Rate Source  --  Visual  --  --  Visual    BP  --  --  --  --  133/75    Noninvasive MAP (mmHg)  --  --  --  --  92    BP Location  --  --  --  --  Left arm    BP Method  --  --  --  --  Automatic    Patient Position  --  --  --  --  Lying       Oxygen Therapy    SpO2  --  93 %  --  --  96 %    Pulse Oximetry Type  --  Continuous  --  Continuous  --    Device (Oxygen Therapy)  humidified;nasal cannula  humidified;nasal cannula  nasal cannula;humidified  nasal cannula  --    Flow (L/min)  3  3  3  3  --    Row Name 06/08/19 0315 06/08/19 0300 06/08/19 0230 06/08/19 0200 06/08/19 0156       Vitals    Pulse  --  102  100  96  88    BP  --  125/49  117/57  127/74  --    Noninvasive MAP (mmHg)  --  62  76  93  --       Oxygen Therapy    SpO2  --  92 %  95 %  98 %  97 %    Device (Oxygen " "Therapy)  nasal cannula  --  --  --  --    Flow (L/min)  2  --  --  --  --    Row Name 06/08/19 0130 06/08/19 0100 06/08/19 0043 06/08/19 0038 06/08/19 0025       Vitals    Pulse  98  110  107  110  96    Heart Rate Source  --  --  --  --  Monitor    Resp  --  --  --  --  22    Resp Rate Source  --  --  --  --  Visual    BP  132/58  131/82  --  --  --    Noninvasive MAP (mmHg)  82  101  --  --  --       Oxygen Therapy    SpO2  94 %  92 %  88 %  (Abnormal)   87 %  (Abnormal)   95 %    Pulse Oximetry Type  --  --  --  --  Continuous    Device (Oxygen Therapy)  --  --  --  --  room air    Row Name 06/08/19 0002 06/08/19 0000 06/07/19 2349             Height and Weight    Height  --  --  152.4 cm (60\")      Height Method  --  --  Stated      Weight  --  --  111 kg (245 lb)      Weight Method  --  --  Stated      Ideal Body Weight (IBW) (kg)  --  --  45.86      BSA (Calculated - sq m)  --  --  2.03 sq meters      BMI (Calculated)  --  --  47.8      Weight in (lb) to have BMI = 25  --  --  127.7         Vitals    Temp  --  --  98.8 °F (37.1 °C)      Temp src  --  --  Oral      Pulse  108  --  120      Heart Rate Source  --  --  Monitor      Resp  --  --  26      Resp Rate Source  --  --  Visual      BP  --  133/72  135/71      Noninvasive MAP (mmHg)  --  100  --      BP Location  --  --  Left arm      BP Method  --  --  Automatic      Patient Position  --  --  Sitting         Oxygen Therapy    SpO2  93 %  95 %  90 % Simultaneous filing. User may be unaware of other data.      Pulse Oximetry Type  --  --  Intermittent      Device (Oxygen Therapy)  --  --  room air Simultaneous filing. User may be unaware of other data.          Hospital Medications (all)       Dose Frequency Start End    ! Vancomycin trough on 6/10 at 0800. HOLD 0800 dose.  Once 6/10/2019 6/10/2019    Sig - Route: 1 (One) Time. - Does not apply    acetaminophen (TYLENOL) tablet 650 mg 650 mg Every 4 Hours PRN 6/8/2019     Sig - Route: Take 2 tablets by " mouth Every 4 (Four) Hours As Needed for Mild Pain . - Oral    azithromycin 500 MG/250 ML 0.9% NS IVPB (MBP) 500 mg Once 6/8/2019 6/8/2019    Sig - Route: Infuse 250 mL into a venous catheter 1 (One) Time. - Intravenous    benzonatate (TESSALON) capsule 200 mg 200 mg 3 Times Daily PRN 6/8/2019     Sig - Route: Take 2 capsules by mouth 3 (Three) Times a Day As Needed for Cough. - Oral    budesonide-formoterol (SYMBICORT) 160-4.5 MCG/ACT inhaler 2 puff 2 puff 2 Times Daily - RT 6/8/2019     Sig - Route: Inhale 2 puffs 2 (Two) Times a Day. - Inhalation    calcium carbonate (TUMS) chewable tablet 500 mg (200 mg elemental) 2 tablet 3 Times Daily PRN 6/10/2019     Sig - Route: Chew 1,000 mg 3 (Three) Times a Day As Needed for Indigestion or Heartburn. - Oral    heparin (porcine) 5000 UNIT/ML injection 5,000 Units 5,000 Units Every 8 Hours Scheduled 6/8/2019     Sig - Route: Inject 1 mL under the skin into the appropriate area as directed Every 8 (Eight) Hours. - Subcutaneous    ipratropium-albuterol (DUO-NEB) nebulizer solution 3 mL 3 mL Once 6/8/2019 6/8/2019    Sig - Route: Take 3 mL by nebulization 1 (One) Time. - Nebulization    ipratropium-albuterol (DUO-NEB) nebulizer solution 3 mL 3 mL Once 6/8/2019 6/8/2019    Sig - Route: Take 3 mL by nebulization 1 (One) Time. - Nebulization    ipratropium-albuterol (DUO-NEB) nebulizer solution 3 mL 3 mL Every 6 Hours - RT 6/8/2019     Sig - Route: Take 3 mL by nebulization Every 6 (Six) Hours. - Nebulization    melatonin tablet 5 mg 5 mg Nightly PRN 6/8/2019     Sig - Route: Take 1 tablet by mouth At Night As Needed for Sleep. - Oral    methylPREDNISolone sodium succinate (SOLU-Medrol) injection 125 mg 125 mg Once 6/8/2019 6/8/2019    Sig - Route: Infuse 2 mL into a venous catheter 1 (One) Time. - Intravenous    methylPREDNISolone sodium succinate (SOLU-Medrol) injection 60 mg 60 mg Every 8 Hours 6/8/2019     Sig - Route: Infuse 0.96 mL into a venous catheter Every 8 (Eight)  Hours. - Intravenous    montelukast (SINGULAIR) tablet 10 mg 10 mg Nightly 6/8/2019     Sig - Route: Take 1 tablet by mouth Every Night. - Oral    ondansetron (ZOFRAN) injection 4 mg 4 mg Once 6/8/2019 6/8/2019    Sig - Route: Infuse 2 mL into a venous catheter 1 (One) Time. - Intravenous    Pharmacy to dose vancomycin  Continuous PRN 6/9/2019 6/16/2019    Sig - Route: Continuous As Needed for Consult. - Does not apply    sodium chloride 0.9 % flush 10 mL 10 mL As Needed 6/7/2019     Sig - Route: Infuse 10 mL into a venous catheter As Needed for Line Care. - Intravenous    Cosign for Ordering: Accepted by Jose G Roberson MD on 6/8/2019  3:54 AM    sodium chloride 0.9 % flush 3 mL 3 mL Every 12 Hours Scheduled 6/8/2019     Sig - Route: Infuse 3 mL into a venous catheter Every 12 (Twelve) Hours. - Intravenous    sodium chloride 0.9 % flush 3-10 mL 3-10 mL As Needed 6/8/2019     Sig - Route: Infuse 3-10 mL into a venous catheter As Needed for Line Care. - Intravenous    vancomycin (VANCOCIN) in iso-osmotic dextrose IVPB 1 g (premix) 200 mL 10 mg/kg × 108 kg Every 8 Hours Scheduled 6/9/2019 6/23/2019    Sig - Route: Infuse 200 mL into a venous catheter Every 8 (Eight) Hours. - Intravenous    vancomycin 2750 mg/500 mL 0.9% NS IVPB (BHS) 25 mg/kg × 108 kg Once 6/9/2019 6/9/2019    Sig - Route: Infuse 500 mL into a venous catheter 1 (One) Time. - Intravenous    azithromycin 500 MG/250 ML 0.9% NS IVPB (MBP) (Discontinued) 500 mg Every 24 Hours 6/9/2019 6/8/2019    Sig - Route: Infuse 250 mL into a venous catheter Daily. - Intravenous    cefTRIAXone (ROCEPHIN) 1 g/100 mL 0.9% NS (MBP) (Discontinued) 1 g Every 24 Hours Scheduled 6/8/2019 6/8/2019    Sig - Route: Infuse 100 mL into a venous catheter Daily. - Intravenous    cefTRIAXone (ROCEPHIN) 1 g/100 mL 0.9% NS (MBP) (Discontinued) 1 g Every 24 Hours Scheduled 6/8/2019 6/8/2019    Sig - Route: Infuse 100 mL into a venous catheter Daily. - Intravenous    cefTRIAXone  (ROCEPHIN) IVPB 1 g (Discontinued) 1 g Once 6/8/2019 6/8/2019    Sig - Route: Infuse 50 mL into a venous catheter 1 (One) Time. - Intravenous    Reason for Discontinue: Duplicate order          Lab Results (all)     Procedure Component Value Units Date/Time    Blood Culture - Blood, Arm, Left [[id number]]  (Abnormal) Collected:  06/08/19 0023    Specimen:  Blood from Arm, Left Updated:  06/10/19 1023     Blood Culture Staphylococcus, coagulase negative     Comment: Probable contaminant requires clinical correlation, susceptibility not performed unless requested by physician.          Gram Stain Anaerobic Bottle Gram positive cocci in pairs and clusters    Vancomycin, Trough [[id number]]  (Normal) Collected:  06/10/19 0859    Specimen:  Blood Updated:  06/10/19 0942     Vancomycin Trough 9.00 mcg/mL     Blood Culture - Blood, Wrist, Left [[id number]] Collected:  06/09/19 0807    Specimen:  Blood from Wrist, Left Updated:  06/10/19 0831     Blood Culture No growth at 24 hours    Blood Culture - Blood, Arm, Left [[id number]] Collected:  06/09/19 0802    Specimen:  Blood from Arm, Left Updated:  06/10/19 0831     Blood Culture No growth at 24 hours    Blood Culture - Blood, Hand, Right [[id number]] Collected:  06/08/19 0029    Specimen:  Blood from Hand, Right Updated:  06/10/19 0516     Blood Culture No growth at 2 days    Basic Metabolic Panel [[id number]]  (Abnormal) Collected:  06/09/19 0802    Specimen:  Blood Updated:  06/09/19 0900     Glucose 229 mg/dL      BUN 11 mg/dL      Creatinine 0.50 mg/dL      Sodium 140 mmol/L      Potassium 4.1 mmol/L      Chloride 105 mmol/L      CO2 25.0 mmol/L      Calcium 9.2 mg/dL      eGFR Non African Amer >150 mL/min/1.73      BUN/Creatinine Ratio 22.0     Anion Gap 10.0 mmol/L     Narrative:       GFR Normal >60  Chronic Kidney Disease <60  Kidney Failure <15    CBC & Differential [[id number]] Collected:  06/09/19 0802    Specimen:  Blood Updated:  06/09/19 0843    Narrative:        The following orders were created for panel order CBC & Differential.  Procedure                               Abnormality         Status                     ---------                               -----------         ------                     CBC Auto Differential[[id number]]        Abnormal            Final result                 Please view results for these tests on the individual orders.    CBC Auto Differential [[id number]]  (Abnormal) Collected:  06/09/19 0802    Specimen:  Blood Updated:  06/09/19 0843     WBC 12.89 10*3/mm3      RBC 5.79 10*6/mm3      Hemoglobin 13.2 g/dL      Hematocrit 44.1 %      MCV 76.2 fL      MCH 22.8 pg      MCHC 29.9 g/dL      RDW 15.4 %      RDW-SD 41.6 fl      MPV 11.7 fL      Platelets 329 10*3/mm3      Neutrophil % 79.7 %      Lymphocyte % 11.2 %      Monocyte % 7.1 %      Eosinophil % 0.0 %      Basophil % 0.1 %      Immature Grans % 1.9 %      Neutrophils, Absolute 10.26 10*3/mm3      Lymphocytes, Absolute 1.45 10*3/mm3      Monocytes, Absolute 0.92 10*3/mm3      Eosinophils, Absolute 0.00 10*3/mm3      Basophils, Absolute 0.01 10*3/mm3      Immature Grans, Absolute 0.25 10*3/mm3      nRBC 0.0 /100 WBC     Blood Culture ID, PCR - Blood, Arm, Left [[id number]]  (Abnormal) Collected:  06/08/19 0023    Specimen:  Blood from Arm, Left Updated:  06/09/19 0839     BCID, PCR Staphylococcus spp, not aureus. Identification by BCID PCR.    [place] Draw [[id number]] Collected:  06/08/19 0005    Specimen:  Blood Updated:  06/08/19 0801    Narrative:       The following orders were created for panel order [place] Draw.  Procedure                               Abnormality         Status                     ---------                               -----------         ------                     Light Blue Top[[id number]]                                   Final result               Green Top (Gel)[[id number]]                                  Final result               Lavender Top[[id number]]                                      Final result               Gold Top - SST[[id number]]                                   Final result               Green Top (No Gel)[[id number]]                                                            Please view results for these tests on the individual orders.    CBC (No Diff) [[id number]]  (Abnormal) Collected:  06/08/19 0609    Specimen:  Blood Updated:  06/08/19 0722     WBC 6.72 10*3/mm3      RBC 5.99 10*6/mm3      Hemoglobin 13.6 g/dL      Hematocrit 45.2 %      MCV 75.5 fL      MCH 22.7 pg      MCHC 30.1 g/dL      RDW 15.3 %      RDW-SD 40.8 fl      MPV 11.3 fL      Platelets 311 10*3/mm3     Basic Metabolic Panel [[id number]]  (Abnormal) Collected:  06/08/19 0609    Specimen:  Blood Updated:  06/08/19 0717     Glucose 208 mg/dL      BUN 8 mg/dL      Creatinine 0.74 mg/dL      Sodium 141 mmol/L      Potassium 4.5 mmol/L      Chloride 104 mmol/L      CO2 24.0 mmol/L      Calcium 8.7 mg/dL      eGFR Non African Amer 97 mL/min/1.73      BUN/Creatinine Ratio 10.8     Anion Gap 13.0 mmol/L     Narrative:       GFR Normal >60  Chronic Kidney Disease <60  Kidney Failure <15    Blood Gas, Arterial With Co-Ox [[id number]]  (Abnormal) Collected:  06/08/19 0156    Specimen:  Arterial Blood Updated:  06/08/19 0203     Site Right Radial     Ismael's Test Positive     pH, Arterial 7.465 pH units      Comment: 83 Value above reference range        pCO2, Arterial 31.6 mm Hg      Comment: 84 Value below reference range        pO2, Arterial 70.7 mm Hg      Comment: 84 Value below reference range        HCO3, Arterial 22.8 mmol/L      Base Excess, Arterial -0.2 mmol/L      Hemoglobin, Blood Gas 14.1 g/dL      Hematocrit, Blood Gas 43.1 %      Oxyhemoglobin 94.4 %      Methemoglobin 0.80 %      Carboxyhemoglobin 1.2 %      CO2 Content 23.7 mmol/L      Temperature 37.0 C      Barometric Pressure for Blood Gas -- mmHg      Comment: N/A        Modality Nasal Cannula     FIO2 28 %      Ventilator Mode        Comment: Meter: [id number]     :  [id number]        Note --     pH, Temp Corrected 7.465 pH Units      pCO2, Temperature Corrected 31.6 mm Hg      pO2, Temperature Corrected 70.7 mm Hg     Light Blue Top [[id number]] Collected:  06/08/19 0006    Specimen:  Blood Updated:  06/08/19 0115     Extra Tube hold for add-on     Comment: Auto resulted       Green Top (Gel) [[id number]] Collected:  06/08/19 0006    Specimen:  Blood Updated:  06/08/19 0115     Extra Tube Hold for add-ons.     Comment: Auto resulted.       Lavender Top [[id number]] Collected:  06/08/19 0005    Specimen:  Blood Updated:  06/08/19 0115     Extra Tube hold for add-on     Comment: Auto resulted       Gold Top - SST [[id number]] Collected:  06/08/19 0006    Specimen:  Blood Updated:  06/08/19 0115     Extra Tube Hold for add-ons.     Comment: Auto resulted.       D-dimer, Quantitative [[id number]]  (Normal) Collected:  06/08/19 0006    Specimen:  Blood Updated:  06/08/19 0114     D-Dimer, Quantitative 0.55 MCGFEU/mL     Comprehensive Metabolic Panel [[id number]]  (Abnormal) Collected:  06/08/19 0006    Specimen:  Blood Updated:  06/08/19 0043     Glucose 94 mg/dL      BUN 6 mg/dL      Creatinine 0.66 mg/dL      Sodium 140 mmol/L      Potassium 3.8 mmol/L      Chloride 104 mmol/L      CO2 22.0 mmol/L      Calcium 9.2 mg/dL      Total Protein 7.4 g/dL      Albumin 4.40 g/dL      ALT (SGPT) 35 U/L      AST (SGOT) 35 U/L      Alkaline Phosphatase 59 U/L      Total Bilirubin 0.7 mg/dL      eGFR Non African Amer 111 mL/min/1.73      Globulin 3.0 gm/dL      A/G Ratio 1.5 g/dL      BUN/Creatinine Ratio 9.1     Anion Gap 14.0 mmol/L     Narrative:       GFR Normal >60  Chronic Kidney Disease <60  Kidney Failure <15    Lactic Acid, Plasma [[id number]]  (Normal) Collected:  06/08/19 0005    Specimen:  Blood Updated:  06/08/19 0039     Lactate 1.1 mmol/L      Comment: Falsely depressed results may occur on samples drawn from patients receiving  N-Acetylcysteine (NAC) or Metamizole.       CBC & Differential [[id number]] Collected:  06/08/19 0005    Specimen:  Blood Updated:  06/08/19 0038    Narrative:       The following orders were created for panel order CBC & Differential.  Procedure                               Abnormality         Status                     ---------                               -----------         ------                     CBC Auto Differential[[id number]]        Abnormal            Final result                 Please view results for these tests on the individual orders.    CBC Auto Differential [[id number]]  (Abnormal) Collected:  06/08/19 0005    Specimen:  Blood Updated:  06/08/19 0038     WBC 8.11 10*3/mm3      RBC 6.24 10*6/mm3      Hemoglobin 14.2 g/dL      Hematocrit 46.8 %      MCV 75.0 fL      MCH 22.8 pg      MCHC 30.3 g/dL      RDW 15.1 %      RDW-SD 39.4 fl      MPV 11.2 fL      Platelets 316 10*3/mm3      Neutrophil % 63.7 %      Lymphocyte % 20.0 %      Monocyte % 11.2 %      Eosinophil % 3.6 %      Basophil % 1.1 %      Immature Grans % 0.4 %      Neutrophils, Absolute 5.17 10*3/mm3      Lymphocytes, Absolute 1.62 10*3/mm3      Monocytes, Absolute 0.91 10*3/mm3      Eosinophils, Absolute 0.29 10*3/mm3      Basophils, Absolute 0.09 10*3/mm3      Immature Grans, Absolute 0.03 10*3/mm3      nRBC 0.0 /100 WBC           Imaging Results (all)     Procedure Component Value Units Date/Time    XR Chest 1 View [[id number]] Collected:  06/08/19 0035     Updated:  06/08/19 0037    Narrative:       CR Chest 1 Vw    SIGNS AND SYMPTOMS:  soa, cough Trouble breathing onset 1 day ago. Hx of asthma     COMPARISONS:  None    FINDINGS:    A portable AP view of the chest was obtained  Fully upright.    There is opacity within the right lung which obscures the right heart border. This raises the possibility of atelectasis given patient history though pneumonia could perhaps also have this appearance. The cardiomediastinal silhouette and  pulmonary  vascularity are normal. No pneumothorax or pleural effusion is identified. The bones are normal for age.      Impression:         1.  Opacity obscuring the right heart border most likely reflective of atelectasis in light of patient's clinical history though pneumonia not excluded.    Signer Name: Porfirio Maharaj MD   Signed: 2019 12:35 AM   Workstation Name: [id number]             ECG/EMG Results (all)     Procedure Component Value Units Date/Time    ECG 12 Lead [[id number]] Collected:  19 0036     Updated:  06/10/19 0652             Physician Progress Notes (all)      Jason Pete MD at 2019  1:42 PM              [facility] Medicine Services  PROGRESS NOTE    Patient Name: Yoli Regan  : 1995  MRN: [id number]    Date of Admission: 2019  Length of Stay: 1  Primary Care Physician: Provider, No Known    Subjective   Subjective     CC:  SOB, severe cough    HPI:  Resting in bed in no acute distress and feels better than yesterday.  However, still has vigorous cough which causes subcostal pleuritic pain and also back pain.  Denies any fever or chills.  No chest pain or palpitation.  No nausea, vomiting, diarrhea, abdominal pain.    Review of Systems      Otherwise ROS is negative except as mentioned in the HPI.    Objective   Objective     Vital Signs:   Temp:  [97.9 °F (36.6 °C)-98.6 °F (37 °C)] 97.9 °F (36.6 °C)  Heart Rate:  [] 91  Resp:  [16-22] 18  BP: (117-125)/(68-81) 117/75        Physical Exam:  Constitutional: Awake, alert  Eyes: PERRLA, sclerae anicteric, no conjunctival injection  HENT: NCAT, mucous membranes moist  Neck: Supple, no thyromegaly, no lymphadenopathy, trachea midline  Respiratory: Clear to auscultation bilaterally, severe cough on deep inspiration, nonlabored respirations   Cardiovascular: RRR, no murmurs, rubs, or gallops, palpable pedal pulses bilaterally  Gastrointestinal: Obese abdomen.  Positive bowel sounds, soft,  nontender, nondistended  Musculoskeletal: Morbidly obese.  No bilateral ankle edema, no clubbing or cyanosis to extremities  Psychiatric: Appropriate affect, cooperative  Neurologic: Oriented x 3, strength symmetric in all extremities, Cranial Nerves grossly intact to confrontation, speech clear  Skin: No rashes    Results Reviewed:  I have personally reviewed current lab, radiology, and data and agree.    Results from last 7 days   Lab Units 06/09/19  0802 06/08/19  0609 06/08/19  0005   WBC 10*3/mm3 12.89* 6.72 8.11   HEMOGLOBIN g/dL 13.2 13.6 14.2   HEMATOCRIT % 44.1 45.2 46.8*   PLATELETS 10*3/mm3 329 311 316     Results from last 7 days   Lab Units 06/09/19  0802 06/08/19  0609 06/08/19  0006   SODIUM mmol/L 140 141 140   POTASSIUM mmol/L 4.1 4.5 3.8   CHLORIDE mmol/L 105 104 104   CO2 mmol/L 25.0 24.0 22.0   BUN mg/dL 11 8 6   CREATININE mg/dL 0.50* 0.74 0.66   GLUCOSE mg/dL 229* 208* 94   CALCIUM mg/dL 9.2 8.7 9.2   ALT (SGPT) U/L  --   --  35*   AST (SGOT) U/L  --   --  35*     Estimated Creatinine Clearance: 194.8 mL/min (A) (by C-G formula based on SCr of 0.5 mg/dL (L)).    Microbiology Results Abnormal     Procedure Component Value - Date/Time    Blood Culture ID, PCR - Blood, Arm, Left [[id number]]  (Abnormal) Collected:  06/08/19 0023    Lab Status:  Final result Specimen:  Blood from Arm, Left Updated:  06/09/19 0839     BCID, PCR Staphylococcus spp, not aureus. Identification by BCID PCR.    Blood Culture - Blood, Arm, Left [[id number]]  (Abnormal) Collected:  06/08/19 0023    Lab Status:  Preliminary result Specimen:  Blood from Arm, Left Updated:  06/09/19 0725     Blood Culture Abnormal Stain     Gram Stain Anaerobic Bottle Gram positive cocci in pairs and clusters    Blood Culture - Blood, Hand, Right [[id number]] Collected:  06/08/19 0029    Lab Status:  Preliminary result Specimen:  Blood from Hand, Right Updated:  06/09/19 0516     Blood Culture No growth at 24 hours          Imaging Results (last  24 hours)     ** No results found for the last 24 hours. **               I have reviewed the medications:  Scheduled Meds:  budesonide-formoterol 2 puff Inhalation BID - RT   heparin (porcine) 5,000 Units Subcutaneous Q8H   ipratropium-albuterol 3 mL Nebulization Q6H - RT   methylPREDNISolone sodium succinate 60 mg Intravenous Q8H   montelukast 10 mg Oral Nightly   sodium chloride 3 mL Intravenous Q12H   vancomycin 10 mg/kg Intravenous Q8H     Continuous Infusions:  Pharmacy to dose vancomycin      PRN Meds:.•  acetaminophen  •  benzonatate  •  melatonin  •  Pharmacy to dose vancomycin  •  sodium chloride  •  sodium chloride      Assessment/Plan   Assessment / Plan     Active Hospital Problems    Diagnosis POA   • **Acute respiratory failure with hypoxia (CMS/[facility]) [J96.01] Yes   • Asthma exacerbation [J45.901] Yes   • Environmental allergies [Z91.09] Yes   • Severe asthma with status asthmaticus [J45.902] Yes          Brief Hospital Course to date:  Yoli Regan is a 24 y.o. female with past medical history of asthma, morbid obesity.  Patient has been treated for respiratory symptoms including cough and severe shortness of breath with antibiotics recently.  As symptoms got worse patient was brought to the emergency room here at [facility].    Assessment and plan:  *Severe asthma exacerbation with expiratory wheezing and severe cough.  Patient also may have upper respiratory tract viral infection.  Improving with Solu-Medrol and neb treatment.  Patient required 4 L of oxygen but today she has improved and is saturating around 90% on room air.    *Acute hypoxic respiratory failure secondary to asthma exacerbation, improving.    *1 set of blood culture tending positive for gram-positive cocci.  Quite possibly this is contamination however patient was started on vancomycin.    *Morbid obesity.    PLAN:  -Start on vancomycin and follow-up blood cultures.  Also get a new set of blood  cultures.  -Ambulate  -Continue current medication.    DVT Prophylaxis:      Disposition: I expect the patient to be discharged home in a few days.    CODE STATUS:   Code Status and Medical Interventions:   Ordered at: 06/08/19 0145     Level Of Support Discussed With:    Patient     Code Status:    CPR     Medical Interventions (Level of Support Prior to Arrest):    Full         Electronically signed by Jason Pete MD, 06/09/19, 1:42 PM.        Electronically signed by Jason Pete MD at 6/9/2019  1:48 PM     Jason Pete MD at 6/8/2019  2:43 PM        Patient seen and examined at the bedside.  Patient resting in bed in no acute distress but still has very vigorous cough and has pleuritic chest pain because of the cough.  Overall she feels better compared to when she came in.  No fever or chills.  No nausea, vomiting, diarrhea, abdominal pain.  -We will continue neb treatment, oxygen treatment, steroids  -Medicine will follow.    Electronically signed by Jason Pete MD at 6/8/2019  2:44 PM       Consult Notes (all)     No notes of this type exist for this encounter.

## 2019-06-10 NOTE — PROGRESS NOTES
Discharge Planning Assessment  Psychiatric     Patient Name: Yoli Regan  MRN: [id number]  Today's Date: 6/10/2019    Admit Date: 6/7/2019    Discharge Needs Assessment     Row Name 06/10/19 1156       Living Environment    Lives With  significant other;child(jimmie), dependent    Current Living Arrangements  home/apartment/condo    Primary Care Provided by  self    Provides Primary Care For  child(jimmie)    Family Caregiver if Needed  significant other    Quality of Family Relationships  supportive    Able to Return to Prior Arrangements  yes       Resource/Environmental Concerns    Transportation Concerns  car, none       Transition Planning    Patient/Family Anticipates Transition to  home with family    Patient/Family Anticipated Services at Transition      Transportation Anticipated  family or friend will provide       Discharge Needs Assessment    Readmission Within the Last 30 Days  no previous admission in last 30 days    Concerns to be Addressed  discharge planning    Equipment Currently Used at Home  nebulizer    Anticipated Changes Related to Illness  none    Equipment Needed After Discharge  nebulizer        Discharge Plan     Row Name 06/10/19 7572       Plan    Plan  HOME    Patient/Family in Agreement with Plan  yes    Plan Comments  Met with pt at bedside.  She resides with her s/o and her 3 y/o child in [facility].  She is independent with ADLs.  She uses a home nebulizer and has requested a new one as her's is > 6 y/o.  CM will arrange for delivery prior to DC.  Per request, CM will also arrange for a new PCP prior to DC.  No current  services.  Confirmed she has [facility] Medicaid with Rx coverage.  Goal is to return home upon DC.  No immediate needs identified/voiced.  CM will cont to follow.    Final Discharge Disposition Code  01 - home or self-care        Destination      No service coordination in this encounter.      Durable Medical Equipment      No service coordination in this  encounter.      Dialysis/Infusion      No service coordination in this encounter.      Home Medical Care      No service coordination in this encounter.      Therapy      No service coordination in this encounter.      Community Resources      No service coordination in this encounter.          Demographic Summary     Row Name 06/10/19 1156       General Information    Admission Type  inpatient    Referral Source  admission list    Reason for Consult  discharge planning    Preferred Language  English       Contact Information    Permission Granted to Share Info With      Contact Information Obtained for          Functional Status     Row Name 06/10/19 1156       Functional Status    Usual Activity Tolerance  good       Functional Status, IADL    Medications  independent    Meal Preparation  independent    Housekeeping  independent    Laundry  independent    Shopping  independent        Psychosocial    No documentation.       Abuse/Neglect    No documentation.       Legal    No documentation.       Substance Abuse    No documentation.       Patient Forms    No documentation.           Anabell Clarke

## 2019-06-10 NOTE — PROGRESS NOTES
[facility] Medicine Services  PROGRESS NOTE    Patient Name: Yoli Regan  : 1995  MRN: [id number]    Date of Admission: 2019  Length of Stay: 2  Primary Care Physician: Provider, No Known    Subjective   Subjective     CC:  SOB, severe cough    HPI:  Resting in bed in no acute distress and feels better than yesterday.  However, still has vigorous cough. Saturates ok on room air while awake but last night she desaturated and was placed on NC O2.  Denies any fever or chills.  No chest pain or palpitation.  No nausea, vomiting, diarrhea, abdominal pain.    Review of Systems      Otherwise ROS is negative except as mentioned in the HPI.    Objective   Objective     Vital Signs:   Temp:  [98.1 °F (36.7 °C)] 98.1 °F (36.7 °C)  Heart Rate:  [] 108  Resp:  [16-20] 20  BP: (124)/(56) 124/56        Physical Exam:  Constitutional: Awake, alert  Eyes: PERRLA, sclerae anicteric, no conjunctival injection  HENT: NCAT, mucous membranes moist  Neck: Supple, no thyromegaly, no lymphadenopathy, trachea midline  Respiratory: some expiratory wheezing,  cough on deep inspiration, nonlabored respirations   Cardiovascular: RRR, no murmurs, rubs, or gallops, palpable pedal pulses bilaterally  Gastrointestinal: Obese abdomen.  Positive bowel sounds, soft, nontender, nondistended  Musculoskeletal: Morbidly obese.  No bilateral ankle edema, no clubbing or cyanosis to extremities  Psychiatric: Appropriate affect, cooperative  Neurologic: Oriented x 3, strength symmetric in all extremities, Cranial Nerves grossly intact to confrontation, speech clear  Skin: No rashes    Results Reviewed:  I have personally reviewed current lab, radiology, and data and agree.    Results from last 7 days   Lab Units 19  0802 19  0609 19  0005   WBC 10*3/mm3 12.89* 6.72 8.11   HEMOGLOBIN g/dL 13.2 13.6 14.2   HEMATOCRIT % 44.1 45.2 46.8*   PLATELETS 10*3/mm3 329 311 316     Results from last 7 days    Lab Units 06/09/19  0802 06/08/19  0609 06/08/19  0006   SODIUM mmol/L 140 141 140   POTASSIUM mmol/L 4.1 4.5 3.8   CHLORIDE mmol/L 105 104 104   CO2 mmol/L 25.0 24.0 22.0   BUN mg/dL 11 8 6   CREATININE mg/dL 0.50* 0.74 0.66   GLUCOSE mg/dL 229* 208* 94   CALCIUM mg/dL 9.2 8.7 9.2   ALT (SGPT) U/L  --   --  35*   AST (SGOT) U/L  --   --  35*     Estimated Creatinine Clearance: 194.8 mL/min (A) (by C-G formula based on SCr of 0.5 mg/dL (L)).    Microbiology Results Abnormal     Procedure Component Value - Date/Time    Blood Culture - Blood, Arm, Left [[id number]]  (Abnormal) Collected:  06/08/19 0023    Lab Status:  Final result Specimen:  Blood from Arm, Left Updated:  06/10/19 1023     Blood Culture Staphylococcus, coagulase negative     Comment: Probable contaminant requires clinical correlation, susceptibility not performed unless requested by physician.          Gram Stain Anaerobic Bottle Gram positive cocci in pairs and clusters    Blood Culture - Blood, Wrist, Left [[id number]] Collected:  06/09/19 0807    Lab Status:  Preliminary result Specimen:  Blood from Wrist, Left Updated:  06/10/19 0831     Blood Culture No growth at 24 hours    Blood Culture - Blood, Arm, Left [[id number]] Collected:  06/09/19 0802    Lab Status:  Preliminary result Specimen:  Blood from Arm, Left Updated:  06/10/19 0831     Blood Culture No growth at 24 hours    Blood Culture - Blood, Hand, Right [[id number]] Collected:  06/08/19 0029    Lab Status:  Preliminary result Specimen:  Blood from Hand, Right Updated:  06/10/19 0516     Blood Culture No growth at 2 days    Blood Culture ID, PCR - Blood, Arm, Left [[id number]]  (Abnormal) Collected:  06/08/19 0023    Lab Status:  Final result Specimen:  Blood from Arm, Left Updated:  06/09/19 0839     BCID, PCR Staphylococcus spp, not aureus. Identification by BCID PCR.          Imaging Results (last 24 hours)     ** No results found for the last 24 hours. **               I have reviewed the  medications:  Scheduled Meds:    budesonide-formoterol 2 puff Inhalation BID - RT   heparin (porcine) 5,000 Units Subcutaneous Q8H   ipratropium-albuterol 3 mL Nebulization Q6H - RT   methylPREDNISolone sodium succinate 60 mg Intravenous Q8H   montelukast 10 mg Oral Nightly   sodium chloride 3 mL Intravenous Q12H   vancomycin 1,250 mg Intravenous Q8H     Continuous Infusions:    Pharmacy to dose vancomycin      PRN Meds:.•  acetaminophen  •  benzonatate  •  calcium carbonate  •  melatonin  •  Pharmacy to dose vancomycin  •  sodium chloride  •  sodium chloride      Assessment/Plan   Assessment / Plan     Active Hospital Problems    Diagnosis POA   • **Acute respiratory failure with hypoxia (CMS/[facility]) [J96.01] Yes   • Asthma exacerbation [J45.901] Yes   • Environmental allergies [Z91.09] Yes   • Severe asthma with status asthmaticus [J45.902] Yes          Brief Hospital Course to date:  Yoli Regan is a 24 y.o. female with past medical history of asthma, morbid obesity.  Patient has been treated for respiratory symptoms including cough and severe shortness of breath with antibiotics recently.  As symptoms got worse patient was brought to the emergency room here at [facility].    Assessment and plan:  *Severe asthma exacerbation with expiratory wheezing and severe cough.  Patient also may have upper respiratory tract viral infection.  Improving with Solu-Medrol and neb treatment.  Patient required 4 L of oxygen but today she has improved and is saturating around 90% on room air.    *Acute hypoxic respiratory failure secondary to asthma exacerbation, improving.    *1 set of blood culture tending positive for gram-positive cocci.  Quite possibly this is contamination however patient was started on vancomycin.    *Morbid obesity.    PLAN:  -change steroid to oral.  - ambulate  - home when more stable.    DVT Prophylaxis:      Disposition: I expect the patient to be discharged home in a few days.    CODE  STATUS:   Code Status and Medical Interventions:   Ordered at: 06/08/19 0145     Level Of Support Discussed With:    Patient     Code Status:    CPR     Medical Interventions (Level of Support Prior to Arrest):    Full         Electronically signed by Jason Pete MD, 06/10/19, 4:05 PM.

## 2019-06-10 NOTE — PROGRESS NOTES
"Adult Nutrition  Assessment/PES    Patient Name:  Yoli Regan  YOB: 1995  MRN: [id number]  Admit Date:  6/7/2019    Assessment Date:  6/10/2019    Comments:      Reason for Assessment     Row Name 06/10/19 0759          Reason for Assessment    Reason For Assessment  follow-up protocol 15\"                   Nutrition Prescription Ordered     Row Name 06/10/19 0759          Nutrition Prescription PO    Current PO Diet  Regular                 Problem/Interventions:  Problem 1     Row Name 06/10/19 0800          Nutrition Diagnoses Problem 1    Problem 1  Unintended Weight Loss         Problem 2     Row Name 06/10/19 0800          Nutrition Diagnoses Problem 2    Problem 2  Nutrition Appropriate for Condition at this Time     Signs/Symptoms (evidenced by)  PO Intake     Percent (%) intake recorded  67 %     Over number of meals  3                     Education/Evaluation     Row Name 06/10/19 0800          Monitor/Evaluation    Monitor  Per protocol           Electronically signed by:  Janina Peck RD  06/10/19 8:00 AM  "

## 2019-06-11 LAB
ANION GAP SERPL CALCULATED.3IONS-SCNC: 12 MMOL/L
BUN BLD-MCNC: 12 MG/DL (ref 6–20)
BUN/CREAT SERPL: 22.2 (ref 7–25)
CALCIUM SPEC-SCNC: 9 MG/DL (ref 8.6–10.5)
CHLORIDE SERPL-SCNC: 102 MMOL/L (ref 98–107)
CO2 SERPL-SCNC: 24 MMOL/L (ref 22–29)
CREAT BLD-MCNC: 0.54 MG/DL (ref 0.57–1)
GFR SERPL CREATININE-BSD FRML MDRD: 140 ML/MIN/1.73
GLUCOSE BLD-MCNC: 238 MG/DL (ref 65–99)
GLUCOSE BLDC GLUCOMTR-MCNC: 218 MG/DL (ref 70–130)
POTASSIUM BLD-SCNC: 4.1 MMOL/L (ref 3.5–5.2)
SODIUM BLD-SCNC: 138 MMOL/L (ref 136–145)

## 2019-06-11 PROCEDURE — 25010000002 HEPARIN (PORCINE) PER 1000 UNITS: Performed by: PHYSICIAN ASSISTANT

## 2019-06-11 PROCEDURE — 63710000001 PREDNISONE PER 5 MG: Performed by: INTERNAL MEDICINE

## 2019-06-11 PROCEDURE — 99232 SBSQ HOSP IP/OBS MODERATE 35: CPT | Performed by: INTERNAL MEDICINE

## 2019-06-11 PROCEDURE — 25010000002 VANCOMYCIN 10 G RECONSTITUTED SOLUTION

## 2019-06-11 PROCEDURE — 82962 GLUCOSE BLOOD TEST: CPT

## 2019-06-11 PROCEDURE — 80048 BASIC METABOLIC PNL TOTAL CA: CPT

## 2019-06-11 PROCEDURE — 94799 UNLISTED PULMONARY SVC/PX: CPT

## 2019-06-11 RX ORDER — PREDNISONE 50 MG/1
50 TABLET ORAL
Status: DISCONTINUED | OUTPATIENT
Start: 2019-06-11 | End: 2019-06-12 | Stop reason: HOSPADM

## 2019-06-11 RX ADMIN — SODIUM CHLORIDE, PRESERVATIVE FREE 3 ML: 5 INJECTION INTRAVENOUS at 21:10

## 2019-06-11 RX ADMIN — PREDNISONE 50 MG: 50 TABLET ORAL at 08:09

## 2019-06-11 RX ADMIN — HEPARIN SODIUM 5000 UNITS: 5000 INJECTION INTRAVENOUS; SUBCUTANEOUS at 06:11

## 2019-06-11 RX ADMIN — BUDESONIDE AND FORMOTEROL FUMARATE DIHYDRATE 2 PUFF: 160; 4.5 AEROSOL RESPIRATORY (INHALATION) at 20:05

## 2019-06-11 RX ADMIN — CALCIUM CARBONATE 2 TABLET: 500 TABLET ORAL at 07:58

## 2019-06-11 RX ADMIN — BUDESONIDE AND FORMOTEROL FUMARATE DIHYDRATE 2 PUFF: 160; 4.5 AEROSOL RESPIRATORY (INHALATION) at 06:46

## 2019-06-11 RX ADMIN — HEPARIN SODIUM 5000 UNITS: 5000 INJECTION INTRAVENOUS; SUBCUTANEOUS at 15:29

## 2019-06-11 RX ADMIN — VANCOMYCIN HYDROCHLORIDE 1250 MG: 10 INJECTION, POWDER, LYOPHILIZED, FOR SOLUTION INTRAVENOUS at 07:58

## 2019-06-11 RX ADMIN — SODIUM CHLORIDE, PRESERVATIVE FREE 3 ML: 5 INJECTION INTRAVENOUS at 08:05

## 2019-06-11 RX ADMIN — MONTELUKAST SODIUM 10 MG: 10 TABLET, COATED ORAL at 21:10

## 2019-06-11 RX ADMIN — IPRATROPIUM BROMIDE AND ALBUTEROL SULFATE 3 ML: 2.5; .5 SOLUTION RESPIRATORY (INHALATION) at 12:36

## 2019-06-11 RX ADMIN — IPRATROPIUM BROMIDE AND ALBUTEROL SULFATE 3 ML: 2.5; .5 SOLUTION RESPIRATORY (INHALATION) at 20:05

## 2019-06-11 RX ADMIN — IPRATROPIUM BROMIDE AND ALBUTEROL SULFATE 3 ML: 2.5; .5 SOLUTION RESPIRATORY (INHALATION) at 06:46

## 2019-06-11 RX ADMIN — HEPARIN SODIUM 5000 UNITS: 5000 INJECTION INTRAVENOUS; SUBCUTANEOUS at 21:10

## 2019-06-11 NOTE — PROGRESS NOTES
Case Management Discharge Note    Final Note: Arranged new home neb per Henrique Aguayo.  Spoke with Jules.  He will deliver to pt's room today.      Destination      No service has been selected for the patient.      Durable Medical Equipment - Selection Complete      Service Provider Request Status Selected Services Address Phone Number Fax Number    [facility]ENRIQUE [facility] Selected Durable Medical Equipment [address] [facility] [address] [phone]      Dialysis/Infusion      No service has been selected for the patient.      Home Medical Care      No service has been selected for the patient.      Therapy      No service has been selected for the patient.      Community Resources      No service has been selected for the patient.             Final Discharge Disposition Code: 01 - home or self-care

## 2019-06-11 NOTE — PLAN OF CARE
Problem: Patient Care Overview  Goal: Plan of Care Review  Outcome: Ongoing (interventions implemented as appropriate)   06/11/19 1522   Coping/Psychosocial   Plan of Care Reviewed With patient   Plan of Care Review   Progress improving   OTHER   Outcome Summary Pt c/o of abdominal/back soreness with coughing fits however does report an improvement in breathing and overall wellness today. Pt resting in between care. PO steroids administered this AM. VSS on RA.. Poss DC in AM.

## 2019-06-11 NOTE — DISCHARGE PLACEMENT REQUEST
"SHASTA MACDONALD, RN    [phone]              Yoli Regan (24 y.o. Female)     Date of Birth Social Security Number Address Home Phone MRN    1995  [address] [place] [address] [phone] [id number]    Bahai Marital Status          Rastafarian Single       Admission Date Admission Type Admitting Provider Attending Provider Department, Room/Bed    19 Emergency Denzel Riddle MD Kalantar, Masoud, MD [facility] 4G, S456/1    Discharge Date Discharge Disposition Discharge Destination                       Attending Provider:  Jason Pete MD    Allergies:  No Known Allergies    Isolation:  None   Infection:  None   Code Status:  CPR    Ht:  152.4 cm (60\")   Wt:  108 kg (238 lb)    Admission Cmt:  None   Principal Problem:  Acute respiratory failure with hypoxia (CMS/[facility]) [J96.01]                 Active Insurance as of 2019     Primary Coverage     Payor Plan Insurance Group Employer/Plan Group    [employer] [place] [employer] [place]      Payor Plan Address Payor Plan Phone Number Payor Plan Fax Number Effective Dates    [address] [address] [phone]  2019 - None Entered    [facility] [address]       Subscriber Name Subscriber Birth Date Member ID       YOLI REGAN 1995 [id number]                 Emergency Contacts      (Rel.) Home Phone Work Phone Mobile Phone    Rahat Regan (Father) [phone] -- --                        [place] [phone]  Dept. Phone:  [phone]  Dept. Fax:   Date Ordered: Fritz 10, 2019         Patient:  Yoli Regan MRN:  [id number]   [address] [address] :  1995  SSN:    Phone: [phone] Sex:  F     Weight: 108 kg (238 lb)         Ht Readings from Last 1 Encounters:   19 152.4 cm (60\")         Home Nebulizer          (Order ID: [id number])    Diagnosis:  Severe asthma with status " asthmaticus, unspecified whether persistent (J45.902 [ICD-10-CM] 493.91 [ICD-9-CM])  Acute respiratory failure with hypoxia (CMS/HCC) (J96.01 [ICD-10-CM] 518.81 [ICD-9-CM])   Quantity:  1     Nebulizer Equipment:  Nebulizer w/ Compressor  Nebulizer Accessories:  Nebulizer Kit - Administration Set, Non-Disposable  Length of Need (99 Months = Lifetime): 12 Months        Authorizing Provider's Phone: [phone]   Verbal Order Mode: Per protocol: cosign required  Authorizing Provider: Jason Pete MD  Authorizing Provider's NPI: [id number]     Order Entered By: Anabell Clarke 6/10/2019 11:56 AM     Electronically signed by: Jason Pete MD 6/10/2019  4:05 PM                 History & Physical      Denzel Darnell MD at 6/8/2019  2:08 AM          DARNELL/ Medicine History and Physical    Primary Care Physician: Provider, No Known    CHIEF COMPLAIN :    Dyspnea.    History of Present Illness  24-year-old obese female presented to ED with a chief complaint of dyspnea/cough-going on for past 3 weeks.  Approximately 2 weeks back patient went to [facility]- was treated for asthma exacerbation with steroids/antibiotics- in spite of that she continued to have symptoms, 2 days back she went to [facility]-better she tested positive for rhinovirus/parainfluenza virus, as per patient CT PE was done-which was negative.  Again she was diagnosed with asthma exacerbation, was treated symptomatically and sent home.  In spite of continued use of her nebulizer, cough syrup, Dulera- she had worsening of dyspnea now at rest, associated with cough/clear sputum and chest pain whenever she tries to take deep breath sharp in nature.  No fever, but felt warm.  Did complain of nausea but no vomiting.  Patient does work in the hospital as .  States over last 3 years has poorly controlled asthma-last admission was in 2018 at  for 2 weeks-never intubated.  Asthma has been managed by primary care.    Review of Systems    Complete ROS done, which is negative except as above and HPI.  Old records reviewed and summarized in PM hx         Past Medical History:   Diagnosis Date   • Asthma-since childhood        Past Surgical History:   Procedure Laterality Date   • CHOLECYSTECTOMY         Family History:   Reviewed with patient-no family history of CAD.    Social History:    reports that she has never smoked. She has never used smokeless tobacco. She reports that she does not drink alcohol or use drugs.    Medications:    (Not in a hospital admission)  No Known Allergies      PHYSICAL EXAM :    Vitals:    06/08/19 0200   BP: 127/74   Pulse: 96   Resp:  22   Temp:  Afebrile   SpO2: 98% on 2 L nasal cannula.     VITALS-   AS ABOVE.  GENERAL- Not distressed, well nourished, obese.  RS-decreased air entry at the bases, could not take a deep breath will start coughing.  CVS- s1s2 Regular, tachycardic, no murmur.  ABD- soft, non tender, distended, no organomegaly. BS good.  EXT- no edema. Pulses + .  NEURO- AAO-3, power 5/5 in all ext, no gross sensory deficit, cranial nerves intact.  EYES- Conjunctivae are normal. Pupils are equal, round, and reactive to light. No scleral icterus.   ENT- no external ear nose lesions, mucosa moist.  NECK-  No tracheal deviation present. No thyromegaly present,No cervical lymphadenopathy.  JOINTS/MSK- no deformity, no swelling.  SKIN- no rash , warm to touch.  PSYCHIATRIC-  has a normal mood and affect.Thought content normal.           RESULTS REVIEWED :   Data.    Results from last 7 days   Lab Units 06/08/19  0005   WBC 10*3/mm3 8.11   HEMOGLOBIN g/dL 14.2   HEMATOCRIT % 46.8*   PLATELETS 10*3/mm3 316     Results from last 7 days   Lab Units 06/08/19  0006   SODIUM mmol/L 140   POTASSIUM mmol/L 3.8   CHLORIDE mmol/L 104   CO2 mmol/L 22.0   BUN mg/dL 6   CREATININE mg/dL 0.66   GLUCOSE mg/dL 94   CALCIUM mg/dL 9.2   ALT (SGPT) U/L 35*   AST (SGOT) U/L 35*       Brief Urine Lab Results     None          pH,  Arterial   Date Value Ref Range Status   06/08/2019 7.465 (H) 7.350 - 7.450 pH units Final     Comment:     83 Value above reference range     ABG-7.46/PCO2 of 31/PO2 of 70% on 2 L FiO2.  Ekg-sinus rhythm at 106 bpm, QTC of 456, Q waves in lead III with T wave inversion.  Cx-?  Obscuring of right heart border atelectasis versus pneumonia  WBC- 8, hemoglobin of 14, neutrophils of 63, platelet of 316.  D-dimer of 0.55, lactic acid of 1.1, BUN/creatinine 6/0.6.  I have personally reviewed current lab, radiology, and ekg .      Active Hospital Problems    Diagnosis POA   • **Acute respiratory failure with hypoxia (CMS/[facility]) [J96.01] Yes   • Asthma exacerbation [J45.901] Yes   • Environmental allergies [Z91.09] Yes         Assessment / Plan     Asthma exacerbation with hypoxic respiratory failure  -Continued albuterol nebulizer every 6 hourly, IV Solu-Medrol, for now we will continue Rocephin/Zithromax probably can taper the antibiotics soon.  - Peak expiratory flow measurements.  -May need pulmonary outpatient appointment upon discharge.  -We will get records from recent [facility] work-up.    DVT PXL  -michael's/scds  -lovenox-not initiated.      I discussed the patients findings and my recommendations with: patient/family.    I believe this patient meets INPATIENT status due to the need for care which can only be reasonably provided in an hospital setting such as aggressive/expedited ancillary services and/or consultation services, the necessity for IV medications, close physician monitoring and/or the possible need for procedures.  In such, I feel patient’s risk for adverse outcomes and need for care warrant INPATIENT evaluation and predict the patient’s care encounter to likely last beyond 2 midnights.        Denzel Riddle MD  06/08/19  2:08 AM            Electronically signed by Denzel Riddle MD at 6/8/2019  2:24 AM

## 2019-06-11 NOTE — PROGRESS NOTES
[facility] Medicine Services  PROGRESS NOTE    Patient Name: Yoli Regan  : 1995  MRN: [id number]    Date of Admission: 2019  Length of Stay: 3  Primary Care Physician: Provider, No Known    Subjective   Subjective     CC:  SOB, severe cough    HPI:  Resting in bed in no acute distress and feels better than yesterday. Cough has improved. Saturates ok on room air.  Denies any fever or chills.  No chest pain or palpitation.  No nausea, vomiting, diarrhea, abdominal pain.    Review of Systems      Otherwise ROS is negative except as mentioned in the HPI.    Objective   Objective     Vital Signs:   Temp:  [98 °F (36.7 °C)-98.5 °F (36.9 °C)] 98.5 °F (36.9 °C)  Heart Rate:  [66-88] 86  Resp:  [20] 20  BP: (116-135)/(74-88) 116/74        Physical Exam:  Constitutional: Awake, alert  Eyes: PERRLA, sclerae anicteric, no conjunctival injection  HENT: NCAT, mucous membranes moist  Neck: Supple, no thyromegaly, no lymphadenopathy, trachea midline  Respiratory: some expiratory wheezing,  cough on deep inspiration, nonlabored respirations   Cardiovascular: RRR, no murmurs, rubs, or gallops, palpable pedal pulses bilaterally  Gastrointestinal: Obese abdomen.  Positive bowel sounds, soft, nontender, nondistended  Musculoskeletal: Morbidly obese.  No bilateral ankle edema, no clubbing or cyanosis to extremities  Psychiatric: Appropriate affect, cooperative  Neurologic: Oriented x 3, strength symmetric in all extremities, Cranial Nerves grossly intact to confrontation, speech clear  Skin: No rashes    Results Reviewed:  I have personally reviewed current lab, radiology, and data and agree.    Results from last 7 days   Lab Units 19  0802 19  0609 19  0005   WBC 10*3/mm3 12.89* 6.72 8.11   HEMOGLOBIN g/dL 13.2 13.6 14.2   HEMATOCRIT % 44.1 45.2 46.8*   PLATELETS 10*3/mm3 329 311 316     Results from last 7 days   Lab Units 19  0405 19  0802 19  0609  06/08/19  0006   SODIUM mmol/L 138 140 141 140   POTASSIUM mmol/L 4.1 4.1 4.5 3.8   CHLORIDE mmol/L 102 105 104 104   CO2 mmol/L 24.0 25.0 24.0 22.0   BUN mg/dL 12 11 8 6   CREATININE mg/dL 0.54* 0.50* 0.74 0.66   GLUCOSE mg/dL 238* 229* 208* 94   CALCIUM mg/dL 9.0 9.2 8.7 9.2   ALT (SGPT) U/L  --   --   --  35*   AST (SGOT) U/L  --   --   --  35*     Estimated Creatinine Clearance: 180.3 mL/min (A) (by C-G formula based on SCr of 0.54 mg/dL (L)).    Microbiology Results Abnormal     Procedure Component Value - Date/Time    Blood Culture - Blood, Wrist, Left [[id number]] Collected:  06/09/19 0807    Lab Status:  Preliminary result Specimen:  Blood from Wrist, Left Updated:  06/11/19 0831     Blood Culture No growth at 2 days    Blood Culture - Blood, Arm, Left [[id number]] Collected:  06/09/19 0802    Lab Status:  Preliminary result Specimen:  Blood from Arm, Left Updated:  06/11/19 0831     Blood Culture No growth at 2 days    Blood Culture - Blood, Hand, Right [[id number]] Collected:  06/08/19 0029    Lab Status:  Preliminary result Specimen:  Blood from Hand, Right Updated:  06/11/19 0516     Blood Culture No growth at 3 days    Blood Culture - Blood, Arm, Left [[id number]]  (Abnormal) Collected:  06/08/19 0023    Lab Status:  Final result Specimen:  Blood from Arm, Left Updated:  06/10/19 1023     Blood Culture Staphylococcus, coagulase negative     Comment: Probable contaminant requires clinical correlation, susceptibility not performed unless requested by physician.          Gram Stain Anaerobic Bottle Gram positive cocci in pairs and clusters    Blood Culture ID, PCR - Blood, Arm, Left [[id number]]  (Abnormal) Collected:  06/08/19 0023    Lab Status:  Final result Specimen:  Blood from Arm, Left Updated:  06/09/19 0839     BCID, PCR Staphylococcus spp, not aureus. Identification by BCID PCR.          Imaging Results (last 24 hours)     ** No results found for the last 24 hours. **               I have reviewed the  medications:  Scheduled Meds:    budesonide-formoterol 2 puff Inhalation BID - RT   heparin (porcine) 5,000 Units Subcutaneous Q8H   ipratropium-albuterol 3 mL Nebulization Q6H - RT   montelukast 10 mg Oral Nightly   predniSONE 50 mg Oral Daily With Breakfast   sodium chloride 3 mL Intravenous Q12H     Continuous Infusions:     PRN Meds:.•  acetaminophen  •  benzonatate  •  calcium carbonate  •  melatonin  •  sodium chloride  •  sodium chloride      Assessment/Plan   Assessment / Plan     Active Hospital Problems    Diagnosis POA   • **Acute respiratory failure with hypoxia (CMS/[facility]) [J96.01] Yes   • Asthma exacerbation [J45.901] Yes   • Environmental allergies [Z91.09] Yes   • Severe asthma with status asthmaticus [J45.902] Yes          Brief Hospital Course to date:  Yoli Regan is a 24 y.o. female with past medical history of asthma, morbid obesity.  Patient has been treated for respiratory symptoms including cough and severe shortness of breath with antibiotics recently.  As symptoms got worse patient was brought to the emergency room here at [facility].    Assessment and plan:  *Severe asthma exacerbation with expiratory wheezing and severe cough.  Patient also may have upper respiratory tract viral infection.  Improving with Solu-Medrol and neb treatment.  Patient required 4 L of oxygen but today she has improved and is saturating around 90% on room air.    *Acute hypoxic respiratory failure secondary to asthma exacerbation, improving.    *1 set of blood culture tending positive for gram-positive cocci.  Quite possibly this is contamination however patient was started on vancomycin.    *Morbid obesity.    PLAN:  -cont current care  - ambulate  - home when more stable.    DVT Prophylaxis:      Disposition: I expect the patient to be discharged home in a few days.    CODE STATUS:   Code Status and Medical Interventions:   Ordered at: 06/08/19 0145     Level Of Support Discussed With:    Patient      Code Status:    CPR     Medical Interventions (Level of Support Prior to Arrest):    Full         Electronically signed by Jason Pete MD, 06/11/19, 1:28 PM.

## 2019-06-12 VITALS
DIASTOLIC BLOOD PRESSURE: 71 MMHG | HEART RATE: 89 BPM | RESPIRATION RATE: 18 BRPM | OXYGEN SATURATION: 94 % | SYSTOLIC BLOOD PRESSURE: 115 MMHG | HEIGHT: 60 IN | WEIGHT: 238 LBS | BODY MASS INDEX: 46.72 KG/M2 | TEMPERATURE: 98.2 F

## 2019-06-12 PROCEDURE — 63710000001 PREDNISONE PER 5 MG: Performed by: INTERNAL MEDICINE

## 2019-06-12 PROCEDURE — 25010000002 HEPARIN (PORCINE) PER 1000 UNITS: Performed by: PHYSICIAN ASSISTANT

## 2019-06-12 PROCEDURE — 94799 UNLISTED PULMONARY SVC/PX: CPT

## 2019-06-12 PROCEDURE — 99239 HOSP IP/OBS DSCHRG MGMT >30: CPT | Performed by: NURSE PRACTITIONER

## 2019-06-12 RX ORDER — PREDNISONE 10 MG/1
10 TABLET ORAL
Qty: 25 TABLET | Refills: 0 | Status: SHIPPED | OUTPATIENT
Start: 2019-06-13 | End: 2019-07-02

## 2019-06-12 RX ORDER — IPRATROPIUM BROMIDE AND ALBUTEROL SULFATE 2.5; .5 MG/3ML; MG/3ML
3 SOLUTION RESPIRATORY (INHALATION) EVERY 6 HOURS PRN
Qty: 360 ML | Refills: 0 | Status: SHIPPED | OUTPATIENT
Start: 2019-06-12 | End: 2019-06-18 | Stop reason: SDUPTHER

## 2019-06-12 RX ADMIN — HEPARIN SODIUM 5000 UNITS: 5000 INJECTION INTRAVENOUS; SUBCUTANEOUS at 06:15

## 2019-06-12 RX ADMIN — BUDESONIDE AND FORMOTEROL FUMARATE DIHYDRATE 2 PUFF: 160; 4.5 AEROSOL RESPIRATORY (INHALATION) at 06:49

## 2019-06-12 RX ADMIN — PREDNISONE 50 MG: 50 TABLET ORAL at 09:57

## 2019-06-12 RX ADMIN — IPRATROPIUM BROMIDE AND ALBUTEROL SULFATE 3 ML: 2.5; .5 SOLUTION RESPIRATORY (INHALATION) at 06:49

## 2019-06-12 RX ADMIN — IPRATROPIUM BROMIDE AND ALBUTEROL SULFATE 3 ML: 2.5; .5 SOLUTION RESPIRATORY (INHALATION) at 00:21

## 2019-06-12 NOTE — DISCHARGE SUMMARY
[facility] Medicine Services  DISCHARGE SUMMARY    Patient Name: Yoli Regan  : 1995  MRN: [id number]    Date of Admission: 2019  Date of Discharge:  2019  Primary Care Physician: Stas, No Known    Consults     No orders found from 2019 to 2019.          Hospital Course     Presenting Problem:   Severe asthma with status asthmaticus, unspecified whether persistent [J45.902]    Active Hospital Problems    Diagnosis  POA   • **Acute respiratory failure with hypoxia (CMS/HCC) [J96.01]  Yes   • Asthma exacerbation [J45.901]  Yes   • Environmental allergies [Z91.09]  Yes   • Severe asthma with status asthmaticus [J45.902]  Yes      Resolved Hospital Problems   No resolved problems to display.          Hospital Course:  Yoli Regan is a 24 y.o. female with past medical history of asthma, morbid obesity.  Patient has been treated for respiratory symptoms including cough and severe shortness of breath with antibiotics recently.  As symptoms got worse patient was brought to the emergency room here at [facility].     Patient was admitted to hospital medicine for further evaluation. Patient had severe asthma exacerbation with upper respiratory tract viral infection. Patient had blood culture + for staphylococcus which was likely containment. She was started on vancomycin but was dc'd due to likely containment. Repeat blood cultures NGTD. Patient was treated with nebs and steroids. She will be sent home with a neb machine and a steroid taper. She has been stable on room air.     Patient has remained clinically stable and will be discharged home today. She will need to follow up with PCP in one week. Patient would like to establish with another pulmonary group we will defer to PCP on referring patient.       Discharge Follow Up Recommendations for labs/diagnostics:  PCP one week   Patient wants to establish with a new pulmonologist     Day of Discharge      HPI:   Patient sitting up in bed in NAD. She states her breathing feels improved. No acute events overnight per nursing. Plan for discharge today and she is agreeable.     Review of Systems  Gen- No fevers, chills  CV- No chest pain, palpitations  Resp- No cough, dyspnea  GI- No N/V/D, abd pain  Otherwise ROS is negative except as mentioned in the HPI.    Vital Signs:   Temp:  [98.2 °F (36.8 °C)-98.7 °F (37.1 °C)] 98.2 °F (36.8 °C)  Heart Rate:  [63-89] 89  Resp:  [18-20] 18  BP: (115)/(71-77) 115/71     Physical Exam:  Constitutional: Awake, alert  Eyes: PERRLA, sclerae anicteric, no conjunctival injection  HENT: NCAT, mucous membranes moist  Neck: Supple, trachea midline  Respiratory: some expiratory wheezing,  no cough, nonlabored respirations, 98% on room air    Cardiovascular: RRR, no murmurs, rubs, or gallops, palpable pedal pulses bilaterally  Gastrointestinal: Obese abdomen.  Positive bowel sounds, soft, nontender, nondistended  Musculoskeletal: Morbidly obese.  No bilateral ankle edema, no clubbing or cyanosis to extremities  Psychiatric: Appropriate affect, cooperative  Neurologic: Oriented x 3, strength symmetric in all extremities, Cranial Nerves grossly intact to confrontation, speech clear  Skin: No rashes             Pertinent  and/or Most Recent Results     Results from last 7 days   Lab Units 06/11/19  0405 06/09/19  0802 06/08/19  0609 06/08/19  0006 06/08/19  0005   WBC 10*3/mm3  --  12.89* 6.72  --  8.11   HEMOGLOBIN g/dL  --  13.2 13.6  --  14.2   HEMATOCRIT %  --  44.1 45.2  --  46.8*   PLATELETS 10*3/mm3  --  329 311  --  316   SODIUM mmol/L 138 140 141 140  --    POTASSIUM mmol/L 4.1 4.1 4.5 3.8  --    CHLORIDE mmol/L 102 105 104 104  --    CO2 mmol/L 24.0 25.0 24.0 22.0  --    BUN mg/dL 12 11 8 6  --    CREATININE mg/dL 0.54* 0.50* 0.74 0.66  --    GLUCOSE mg/dL 238* 229* 208* 94  --    CALCIUM mg/dL 9.0 9.2 8.7 9.2  --      Results from last 7 days   Lab Units 06/08/19  0006   BILIRUBIN  mg/dL 0.7   ALK PHOS U/L 59   ALT (SGPT) U/L 35*   AST (SGOT) U/L 35*           Invalid input(s): TG, LDLCALC, LDLREALC  Results from last 7 days   Lab Units 06/08/19  0005   LACTATE mmol/L 1.1       Brief Urine Lab Results     None          Microbiology Results Abnormal     Procedure Component Value - Date/Time    Blood Culture - Blood, Arm, Left [[id number]] Collected:  06/09/19 0802    Lab Status:  Preliminary result Specimen:  Blood from Arm, Left Updated:  06/12/19 0830     Blood Culture No growth at 3 days    Blood Culture - Blood, Wrist, Left [[id number]] Collected:  06/09/19 0807    Lab Status:  Preliminary result Specimen:  Blood from Wrist, Left Updated:  06/12/19 0830     Blood Culture No growth at 3 days    Blood Culture - Blood, Hand, Right [[id number]] Collected:  06/08/19 0029    Lab Status:  Preliminary result Specimen:  Blood from Hand, Right Updated:  06/12/19 0516     Blood Culture No growth at 4 days    Blood Culture - Blood, Arm, Left [[id number]]  (Abnormal) Collected:  06/08/19 0023    Lab Status:  Final result Specimen:  Blood from Arm, Left Updated:  06/10/19 1023     Blood Culture Staphylococcus, coagulase negative     Comment: Probable contaminant requires clinical correlation, susceptibility not performed unless requested by physician.          Gram Stain Anaerobic Bottle Gram positive cocci in pairs and clusters    Blood Culture ID, PCR - Blood, Arm, Left [[id number]]  (Abnormal) Collected:  06/08/19 0023    Lab Status:  Final result Specimen:  Blood from Arm, Left Updated:  06/09/19 0839     BCID, PCR Staphylococcus spp, not aureus. Identification by BCID PCR.          Imaging Results (all)     Procedure Component Value Units Date/Time    XR Chest 1 View [[id number]] Collected:  06/08/19 0035     Updated:  06/08/19 0037    Narrative:       CR Chest 1 Vw    SIGNS AND SYMPTOMS:  soa, cough Trouble breathing onset 1 day ago. Hx of asthma     COMPARISONS:  None    FINDINGS:    A portable AP view  of the chest was obtained  Fully upright.    There is opacity within the right lung which obscures the right heart border. This raises the possibility of atelectasis given patient history though pneumonia could perhaps also have this appearance. The cardiomediastinal silhouette and pulmonary  vascularity are normal. No pneumothorax or pleural effusion is identified. The bones are normal for age.      Impression:         1.  Opacity obscuring the right heart border most likely reflective of atelectasis in light of patient's clinical history though pneumonia not excluded.    Signer Name: Porfirio Mhaaraj MD   Signed: 6/8/2019 12:35 AM   Workstation Name: [id number]                             Order Current Status    Blood Culture - Blood, Arm, Left Preliminary result    Blood Culture - Blood, Hand, Right Preliminary result    Blood Culture - Blood, Wrist, Left Preliminary result        Discharge Details        Discharge Medications      New Medications      Instructions Start Date   ipratropium-albuterol 0.5-2.5 mg/3 ml nebulizer  Commonly known as:  DUO-NEB   3 mL, Nebulization, Every 6 Hours PRN      predniSONE 10 MG tablet  Commonly known as:  DELTASONE   10 mg, Oral, Daily With Breakfast, 40 mg for 3 days, 30 mg for 2days, 20 mg for 2 days, 10 mg for 2 days, 5 mg for 1 day then d/c   Start Date:  6/13/2019        Continue These Medications      Instructions Start Date   benzonatate 200 MG capsule  Commonly known as:  TESSALON   200 mg, Oral, 3 Times Daily PRN      mometasone-formoterol 200-5 MCG/ACT inhaler  Commonly known as:  DULERA 200   2 puffs, Inhalation, 2 Times Daily - RT      montelukast 10 MG tablet  Commonly known as:  SINGULAIR   10 mg, Oral, Nightly         Stop These Medications    albuterol (2.5 MG/3ML) 0.083% nebulizer solution  Commonly known as:  PROVENTIL            No Known Allergies      Discharge Disposition:  Home or Self Care    Discharge Diet:  Diet Order   Procedures   • Diet Regular          Discharge Activity:   Activity Instructions     Activity as Tolerated              CODE STATUS:    Code Status and Medical Interventions:   Ordered at: 06/08/19 0145     Level Of Support Discussed With:    Patient     Code Status:    CPR     Medical Interventions (Level of Support Prior to Arrest):    Full         Future Appointments   Date Time Provider Department Center   6/18/2019 11:30 AM Louie Beckwith PA [facility] None       Additional Instructions for the Follow-ups that You Need to Schedule     Discharge Follow-up with PCP   As directed       Currently Documented PCP:    Provider, No Known    PCP Phone Number:    None     Follow Up Details:  pcp one week               Time Spent on Discharge: 35  minutes    Electronically signed by WILLIE Mcclain, 06/12/19, 9:15 AM.

## 2019-06-12 NOTE — PLAN OF CARE
Problem: Patient Care Overview  Goal: Plan of Care Review  Outcome: Ongoing (interventions implemented as appropriate)   06/12/19 3616   Coping/Psychosocial   Plan of Care Reviewed With patient   Plan of Care Review   Progress improving   OTHER   Outcome Summary PT rested throughout the night, voices no concerns or complaints at this time.        Problem: ARDS (Acute Resp Distress Syndrome) (Adult)  Goal: Signs and Symptoms of Listed Potential Problems Will be Absent, Minimized or Managed (ARDS)   06/12/19 5534   Goal/Outcome Evaluation   Problems Assessed (Acute Respiratory Distress Syndrome) all   Problems Present (ARDS) hypoxia/hypoxemia;situational response

## 2019-06-13 ENCOUNTER — TELEPHONE (OUTPATIENT)
Dept: FAMILY MEDICINE CLINIC | Facility: CLINIC | Age: 24
End: 2019-06-13

## 2019-06-13 ENCOUNTER — READMISSION MANAGEMENT (OUTPATIENT)
Dept: CALL CENTER | Facility: HOSPITAL | Age: 24
End: 2019-06-13

## 2019-06-13 LAB — BACTERIA SPEC AEROBE CULT: NORMAL

## 2019-06-13 NOTE — OUTREACH NOTE
Prep Survey      Responses   Facility patient discharged from?  [place] [facility]   Is patient eligible?  Yes   Discharge diagnosis  Severe asthma with status asthmaticus   Does the patient have one of the following disease processes/diagnoses(primary or secondary)?  Other   Does the patient have Home health ordered?  No   Is there a DME ordered?  Yes   What DME was ordered?  home neb per [facility]   Prep survey completed?  Yes          Tori Barlow RN

## 2019-06-13 NOTE — TELEPHONE ENCOUNTER
Called pt to complete hospital f/u call and confirm new PCP appt viri Beckwith 6/18/19 as requested by [facility].  Call answered but was disconnected.   <<-----Click here for Discharge Medication Review

## 2019-06-14 ENCOUNTER — READMISSION MANAGEMENT (OUTPATIENT)
Dept: CALL CENTER | Facility: HOSPITAL | Age: 24
End: 2019-06-14

## 2019-06-14 LAB
BACTERIA SPEC AEROBE CULT: NORMAL
BACTERIA SPEC AEROBE CULT: NORMAL

## 2019-06-14 NOTE — OUTREACH NOTE
Medical Week 1 Survey      Responses   Facility patient discharged from?  [place]   Does the patient have one of the following disease processes/diagnoses(primary or secondary)?  Other   Is there a successful TCM telephone encounter documented?  No   Week 1 attempt successful?  No   Unsuccessful attempts  Attempt 1          Tricia Thomas RN

## 2019-06-17 ENCOUNTER — READMISSION MANAGEMENT (OUTPATIENT)
Dept: CALL CENTER | Facility: HOSPITAL | Age: 24
End: 2019-06-17

## 2019-06-17 NOTE — OUTREACH NOTE
Medical Week 1 Survey      Responses   Facility patient discharged from?  [place]   Does the patient have one of the following disease processes/diagnoses(primary or secondary)?  Other   Is there a successful TCM telephone encounter documented?  No   Week 1 attempt successful?  No   Unsuccessful attempts  Attempt 2          Mendy Pope RN

## 2019-06-18 ENCOUNTER — OFFICE VISIT (OUTPATIENT)
Dept: FAMILY MEDICINE CLINIC | Facility: CLINIC | Age: 24
End: 2019-06-18

## 2019-06-18 VITALS
HEART RATE: 91 BPM | DIASTOLIC BLOOD PRESSURE: 80 MMHG | SYSTOLIC BLOOD PRESSURE: 118 MMHG | BODY MASS INDEX: 47.51 KG/M2 | OXYGEN SATURATION: 99 % | HEIGHT: 60 IN | WEIGHT: 242 LBS

## 2019-06-18 DIAGNOSIS — R06.81 APNEA: ICD-10-CM

## 2019-06-18 DIAGNOSIS — K21.9 GASTROESOPHAGEAL REFLUX DISEASE, ESOPHAGITIS PRESENCE NOT SPECIFIED: ICD-10-CM

## 2019-06-18 DIAGNOSIS — Z91.09 ENVIRONMENTAL ALLERGIES: ICD-10-CM

## 2019-06-18 DIAGNOSIS — J45.901 EXACERBATION OF ASTHMA, UNSPECIFIED ASTHMA SEVERITY, UNSPECIFIED WHETHER PERSISTENT: Primary | ICD-10-CM

## 2019-06-18 PROCEDURE — 99203 OFFICE O/P NEW LOW 30 MIN: CPT | Performed by: PHYSICIAN ASSISTANT

## 2019-06-18 RX ORDER — GUAIFENESIN 600 MG/1
600 TABLET, EXTENDED RELEASE ORAL 2 TIMES DAILY
Qty: 28 TABLET | Refills: 0 | Status: SHIPPED | OUTPATIENT
Start: 2019-06-18 | End: 2019-07-02

## 2019-06-18 RX ORDER — OMEPRAZOLE 20 MG/1
20 TABLET, DELAYED RELEASE ORAL DAILY
Qty: 30 TABLET | Refills: 0 | Status: SHIPPED | OUTPATIENT
Start: 2019-06-18 | End: 2019-07-02 | Stop reason: SDUPTHER

## 2019-06-18 RX ORDER — IPRATROPIUM BROMIDE AND ALBUTEROL SULFATE 2.5; .5 MG/3ML; MG/3ML
3 SOLUTION RESPIRATORY (INHALATION) EVERY 6 HOURS PRN
Qty: 360 ML | Refills: 0 | Status: SHIPPED | OUTPATIENT
Start: 2019-06-18 | End: 2019-07-02 | Stop reason: SDUPTHER

## 2019-06-18 RX ORDER — BUDESONIDE AND FORMOTEROL FUMARATE DIHYDRATE 160; 4.5 UG/1; UG/1
2 AEROSOL RESPIRATORY (INHALATION)
Qty: 1 INHALER | Refills: 3 | Status: SHIPPED | OUTPATIENT
Start: 2019-06-18 | End: 2021-05-24

## 2019-06-18 NOTE — PROGRESS NOTES
Chief Complaint   Patient presents with   • [facility] Care   • Asthma     6/7-6/12 hospital follow up. Went to [facility] 6/5/19 dx rhinovirus, and parainfluenza virus       HPI     Yoli Regan is a pleasant 24 y.o. female with PMH significant for allergies and asthma who presents to establish care. Asthma chronic, diagnosed as child.  She has been admitted 3 times recently for asthma exacerbations. Discharged most recently from [facility] on 6/12 after a 5-day stay. We reviewed her discharge summary. She states symptoms typically begin as a cold and worsen. She is currently on day 4 of a 10-day prednisone taper and feels improved. She does still require duonebs q6h, feels soa if she uses then qh8. Slight cough and wheezing in the mornings. Was previously on Dulera but this was changed to symbicort during admission; professes inhaler compliance. She reports daily GERD since she has been on steroids. There were some overnight oxygen desaturations observed during one of her admissions as well. She admits to apnea. Her allergies have been improved on Singulair. She has itching and sneezing when she goes outside.      Past Medical History:   Diagnosis Date   • Asthma        Past Surgical History:   Procedure Laterality Date   • CHOLECYSTECTOMY         History reviewed. No pertinent family history.    Social History     Socioeconomic History   • Marital status: Single     Spouse name: Not on file   • Number of children: Not on file   • Years of education: Not on file   • Highest education level: Not on file   Tobacco Use   • Smoking status: Never Smoker   • Smokeless tobacco: Never Used   Substance and Sexual Activity   • Alcohol use: No     Frequency: Never   • Drug use: No       No Known Allergies    ROS    Review of Systems   Constitutional: Negative for chills and fever.   Respiratory: Positive for cough (slight), shortness of breath and wheezing.    Cardiovascular: Negative for chest pain.   Gastrointestinal:  Positive for GERD. Negative for abdominal pain and blood in stool.       Vitals:    06/18/19 1151   BP: 118/80   Pulse: 91   SpO2: 99%         Current Outpatient Medications:   •  ipratropium-albuterol (DUO-NEB) 0.5-2.5 mg/3 ml nebulizer, Take 3 mL by nebulization Every 6 (Six) Hours As Needed for Wheezing., Disp: 360 mL, Rfl: 0  •  montelukast (SINGULAIR) 10 MG tablet, Take 10 mg by mouth Every Night., Disp: , Rfl:   •  predniSONE (DELTASONE) 10 MG tablet, Take 1 tablet by mouth Daily With Breakfast. 40 mg for 3 days, 30 mg for 2days, 20 mg for 2 days, 10 mg for 2 days, 5 mg for 1 day then d/c, Disp: 25 tablet, Rfl: 0  •  budesonide-formoterol (SYMBICORT) 160-4.5 MCG/ACT inhaler, Inhale 2 puffs 2 (Two) Times a Day., Disp: 1 inhaler, Rfl: 3  •  guaiFENesin (MUCINEX) 600 MG 12 hr tablet, Take 1 tablet by mouth 2 (Two) Times a Day for 14 days., Disp: 28 tablet, Rfl: 0  •  omeprazole OTC (PRILOSEC OTC) 20 MG EC tablet, Take 1 tablet by mouth Daily., Disp: 30 tablet, Rfl: 0    PE    Physical Exam   Constitutional: She appears well-developed and well-nourished. No distress. She is morbidly obese.  HENT:   Head: Normocephalic.   Right Ear: Tympanic membrane normal.   Left Ear: Tympanic membrane normal.   Nose: Nose normal.   Mouth/Throat: Oropharynx is clear and moist and mucous membranes are normal. Tonsils are 2+ on the right. Tonsils are 2+ on the left.   Cardiovascular: Normal rate, regular rhythm and normal heart sounds.   No murmur heard.  Pulmonary/Chest: Effort normal and breath sounds normal.   Neurological: She is alert.   Psychiatric: She has a normal mood and affect. Her behavior is normal.   Vitals reviewed.       A/P    Problem List Items Addressed This Visit        Respiratory    Asthma exacerbation - Primary  -Improving on prednisone taper, continue this  -She would like to establish care with pulmonology, will refer  -Continue Symbicort, Singulair, refill DuoNebs  -She has worsening symptoms, may need to  extend prednisone taper.  -GERD may be influencing this as well, will start PPI daily for 2 weeks    Other Relevant Orders    Ambulatory Referral to Pulmonology       Other    Environmental allergies  -She has been trying to stay indoors as much as possible  -Encouraged use of Zyrtec and Flonase if needed  -If allergies persist, she may need immunotherapy      Other Visit Diagnoses     Apnea      -Likely sleep apnea, follow-up with pulmonology for sleep study    Relevant Orders    Ambulatory Referral to Pulmonology    Gastroesophageal reflux disease, esophagitis presence not specified     -See above commentary     Relevant Medications    omeprazole OTC (PRILOSEC OTC) 20 MG EC tablet          Plan of care reviewed with patient at the conclusion of today's visit. Education was provided regarding diagnosis, management and any prescribed or recommended OTC medications.  Patient verbalizes understanding of and agreement with management plan.        HAMILTON Laura

## 2019-06-19 ENCOUNTER — READMISSION MANAGEMENT (OUTPATIENT)
Dept: CALL CENTER | Facility: HOSPITAL | Age: 24
End: 2019-06-19

## 2019-06-19 NOTE — OUTREACH NOTE
Medical Week 2 Survey      Responses   Facility patient discharged from?  [place]   Does the patient have one of the following disease processes/diagnoses(primary or secondary)?  Other   Week 2 attempt successful?  No   Unsuccessful attempts  Attempt 1          Miriam Martinez RN

## 2019-06-21 ENCOUNTER — READMISSION MANAGEMENT (OUTPATIENT)
Dept: CALL CENTER | Facility: HOSPITAL | Age: 24
End: 2019-06-21

## 2019-06-21 NOTE — OUTREACH NOTE
Medical Week 2 Survey      Responses   Facility patient discharged from?  [place]   Does the patient have one of the following disease processes/diagnoses(primary or secondary)?  Other   Week 2 attempt successful?  No   Unsuccessful attempts  Attempt 2          Tori Barnes RN

## 2019-07-02 ENCOUNTER — OFFICE VISIT (OUTPATIENT)
Dept: FAMILY MEDICINE CLINIC | Facility: CLINIC | Age: 24
End: 2019-07-02

## 2019-07-02 VITALS
SYSTOLIC BLOOD PRESSURE: 110 MMHG | HEIGHT: 60 IN | BODY MASS INDEX: 47.51 KG/M2 | HEART RATE: 93 BPM | WEIGHT: 242 LBS | DIASTOLIC BLOOD PRESSURE: 80 MMHG | OXYGEN SATURATION: 98 %

## 2019-07-02 DIAGNOSIS — Z91.09 ENVIRONMENTAL ALLERGIES: ICD-10-CM

## 2019-07-02 DIAGNOSIS — Z87.892 PERSONAL HISTORY OF ANAPHYLAXIS: ICD-10-CM

## 2019-07-02 DIAGNOSIS — J45.50 SEVERE PERSISTENT ASTHMA, UNSPECIFIED WHETHER COMPLICATED: Primary | ICD-10-CM

## 2019-07-02 PROBLEM — J45.901 ASTHMA EXACERBATION: Status: RESOLVED | Noted: 2019-06-08 | Resolved: 2019-07-02

## 2019-07-02 PROBLEM — J45.902 SEVERE ASTHMA WITH STATUS ASTHMATICUS: Status: RESOLVED | Noted: 2019-06-08 | Resolved: 2019-07-02

## 2019-07-02 PROBLEM — E66.01 MORBID OBESITY: Status: ACTIVE | Noted: 2019-07-02

## 2019-07-02 PROBLEM — K21.9 GERD (GASTROESOPHAGEAL REFLUX DISEASE): Status: ACTIVE | Noted: 2019-07-02

## 2019-07-02 PROBLEM — J96.01 ACUTE RESPIRATORY FAILURE WITH HYPOXIA: Status: RESOLVED | Noted: 2019-06-08 | Resolved: 2019-07-02

## 2019-07-02 PROCEDURE — 99214 OFFICE O/P EST MOD 30 MIN: CPT | Performed by: PHYSICIAN ASSISTANT

## 2019-07-02 RX ORDER — EPINEPHRINE 0.3 MG/.3ML
0.3 INJECTION SUBCUTANEOUS ONCE
Qty: 1 EACH | Refills: 0 | Status: SHIPPED | OUTPATIENT
Start: 2019-07-02 | End: 2019-07-02

## 2019-07-02 RX ORDER — OMEPRAZOLE 20 MG/1
20 TABLET, DELAYED RELEASE ORAL DAILY
Qty: 30 TABLET | Refills: 1 | Status: ON HOLD | OUTPATIENT
Start: 2019-07-02 | End: 2022-04-25 | Stop reason: SDUPTHER

## 2019-07-02 RX ORDER — IPRATROPIUM BROMIDE AND ALBUTEROL SULFATE 2.5; .5 MG/3ML; MG/3ML
3 SOLUTION RESPIRATORY (INHALATION) EVERY 6 HOURS PRN
Qty: 360 ML | Refills: 0 | Status: SHIPPED | OUTPATIENT
Start: 2019-07-02 | End: 2022-04-21

## 2019-07-02 RX ORDER — LORATADINE 10 MG/1
10 TABLET ORAL DAILY
Qty: 30 TABLET | Refills: 11 | Status: SHIPPED | OUTPATIENT
Start: 2019-07-02 | End: 2021-05-24

## 2019-07-02 NOTE — PROGRESS NOTES
Chief Complaint   Patient presents with   • Follow-up     2 wk follow up asthma 6/18/19 went to the ER after visit went to friends house had a asthma/allergic reaction O2 went to 89 went to [facility]. Just finished steroids yesterday.        HPI     Yoli Regan is a pleasant 24 y.o. female who presents for 2-week follow-up evaluation of asthma exacerbation. After her appointment here on 6/18, the patient reports she had anaphylaxis triggered by scented wax beads at a friend's apartment. Evaluated at [facility], placed back on steroid taper which she completed yesterday. She feels much improved. GERD has resolved on omeprazole. Complaint with symbicort, singulair qd. She does need to use duonebs at night and in the mornings wheezing and shortness of breath. She also uses her albuterol inhaler twice daily while at work. Past exacerbations have been triggered by going outside. Has generic antihistamine but she does not use it daily. No sputum or fever. She was referred to pulmonology but has not heard about this appointment.    Past Medical History:   Diagnosis Date   • Asthma        Past Surgical History:   Procedure Laterality Date   • CHOLECYSTECTOMY         History reviewed. No pertinent family history.    Social History     Socioeconomic History   • Marital status: Single     Spouse name: Not on file   • Number of children: Not on file   • Years of education: Not on file   • Highest education level: Not on file   Tobacco Use   • Smoking status: Never Smoker   • Smokeless tobacco: Never Used   Substance and Sexual Activity   • Alcohol use: No     Frequency: Never   • Drug use: No       No Known Allergies    ROS    Review of Systems   HENT: Negative for sneezing and trouble swallowing.    Respiratory: Positive for shortness of breath and wheezing. Negative for cough.    Cardiovascular: Negative for chest pain.   Gastrointestinal: Negative for GERD.   Allergic/Immunologic: Positive for environmental  allergies.       Vitals:    07/02/19 1212   BP: 110/80   Pulse: 93   SpO2: 98%         Current Outpatient Medications:   •  budesonide-formoterol (SYMBICORT) 160-4.5 MCG/ACT inhaler, Inhale 2 puffs 2 (Two) Times a Day., Disp: 1 inhaler, Rfl: 3  •  guaiFENesin (MUCINEX) 600 MG 12 hr tablet, Take 1 tablet by mouth 2 (Two) Times a Day for 14 days., Disp: 28 tablet, Rfl: 0  •  ipratropium-albuterol (DUO-NEB) 0.5-2.5 mg/3 ml nebulizer, Take 3 mL by nebulization Every 6 (Six) Hours As Needed for Wheezing., Disp: 360 mL, Rfl: 0  •  montelukast (SINGULAIR) 10 MG tablet, Take 10 mg by mouth Every Night., Disp: , Rfl:   •  omeprazole OTC (PRILOSEC OTC) 20 MG EC tablet, Take 1 tablet by mouth Daily., Disp: 30 tablet, Rfl: 1  •  EPINEPHrine (EPIPEN 2-SUZI) 0.3 MG/0.3ML solution auto-injector injection, Inject 0.3 mL into the appropriate muscle as directed by prescriber 1 (One) Time for 1 dose., Disp: 1 each, Rfl: 0  •  loratadine (CLARITIN) 10 MG tablet, Take 1 tablet by mouth Daily., Disp: 30 tablet, Rfl: 11    PE    Physical Exam   Constitutional: She appears well-developed and well-nourished. No distress.   HENT:   Head: Normocephalic.   Cardiovascular: Normal rate, regular rhythm and normal heart sounds.   No murmur heard.  Pulmonary/Chest: Effort normal and breath sounds normal.   Neurological: She is alert.   Psychiatric: She has a normal mood and affect. Her behavior is normal.   Vitals reviewed.       A/P    Problem List Items Addressed This Visit        Other    Environmental allergies  -Daily antihistamine like claritin or zyrtec advised      Other Visit Diagnoses     Severe persistent asthma, unspecified whether complicated    -  Primary  -Refill duo nebs, allergies seems to be a trigger.   -Will check status of her referral to pulmonology. Also recommending follow-up with allergist for further management  -Continue symbicort and singulair    Relevant Medications    ipratropium-albuterol (DUO-NEB) 0.5-2.5 mg/3 ml  nebulizer    Other Relevant Orders    Ambulatory Referral to Allergy    Personal history of anaphylaxis      -Rx Epi pen. Reviewed when to use          Plan of care reviewed with patient at the conclusion of today's visit. Education was provided regarding diagnosis, management and any prescribed or recommended OTC medications.  Patient verbalizes understanding of and agreement with management plan.        HAMILTON Laura

## 2020-01-14 ENCOUNTER — TELEPHONE (OUTPATIENT)
Dept: FAMILY MEDICINE CLINIC | Facility: CLINIC | Age: 25
End: 2020-01-14

## 2020-01-15 NOTE — TELEPHONE ENCOUNTER
2nd attempt to contact pt.  Left message to return call.  TCM not applicable due to nonparticipating insurance.

## 2020-06-22 NOTE — PROGRESS NOTES
Clinical Nutrition   Reason For Visit: Identified at risk by screening criteria, MST score 2+    Patient Name: Yoli Regan  YOB: 1995  MRN: [id number]  Date of Encounter: 06/08/19 10:10 AM  Admission date: 6/7/2019      Nutrition Assessment     Admission Problem List:  Dyspnea/Cough  Nausea  Asthma exacerbation with acute respiratory failure      PMH: She  has a past medical history of Asthma.   PSxH: She  has a past surgical history that includes Cholecystectomy.        Reported/Observed/Food/Nutrition Related History   Patient reports good/normal appetite and PO intake prior to admission. Reports she has unintentionally lost weight within the past 2 weeks from 245 lbs down to 238 lbs. She is unsure how this happened since there have been no changes in her PO intake and she is on steroids which typically makes her gain weight. She struggles to lose weight when trying. Denies food allergies and difficulty chewing/swallowing. Reports she ate almost all of her breakfast this morning - RD verified by observing tray at bedside.      Anthropometrics   Height: 60 in  Weight: 238 lbs (standing weight 238 lbs per nsg doc)  BMI: 46.5  BMI classification: Obese Class III extreme obesity: > or equal to 40kg/m2   UBW: 245 lbs (standing wt at work 2 weeks ago)  IBW: 100 lbs      Weight change: Unintentional weight loss of 7 lbs (2.8%) x 2 weeks      Labs reviewed   Labs reviewed: Yes    Medications reviewed   Medications reviewed: Yes  Pertinent: steroid    Current Nutrition Prescription   PO: Diet Regular    Evaluation of Received Nutrient/Fluid Intake: 75% x 1 meal      Nutrition Diagnosis     Problem Unintended weight loss   Etiology Clinical condition   Signs/Symptoms Unintentional weight loss of 7 lbs (2.8%) x 2 weeks       Intervention   Intervention: Follow treatment progress, Care plan reviewed, Interview for preferences, Encourage intake      Goal:   General: Nutrition support treatment  PO: Establish  US is not passing ABN for insurance.  Please change diagnosis code.  Route to [phone]   PO      Monitoring/Evaluation:       Monitoring/Evaluation: Per protocol, PO intake, Weight  Will Continue to follow per protocol  Rhiannon Santiago RD  Time Spent: 40 min

## 2021-05-24 ENCOUNTER — OFFICE VISIT (OUTPATIENT)
Dept: INTERNAL MEDICINE | Facility: CLINIC | Age: 26
End: 2021-05-24

## 2021-05-24 VITALS
SYSTOLIC BLOOD PRESSURE: 122 MMHG | OXYGEN SATURATION: 98 % | BODY MASS INDEX: 43.59 KG/M2 | DIASTOLIC BLOOD PRESSURE: 78 MMHG | WEIGHT: 246 LBS | HEART RATE: 84 BPM | HEIGHT: 63 IN | RESPIRATION RATE: 16 BRPM | TEMPERATURE: 98 F

## 2021-05-24 DIAGNOSIS — M54.6 CHRONIC BILATERAL THORACIC BACK PAIN: ICD-10-CM

## 2021-05-24 DIAGNOSIS — Z11.59 ENCOUNTER FOR HEPATITIS C SCREENING TEST FOR LOW RISK PATIENT: ICD-10-CM

## 2021-05-24 DIAGNOSIS — R73.09 ELEVATED GLUCOSE: ICD-10-CM

## 2021-05-24 DIAGNOSIS — N64.4 PAINFUL BREASTS: ICD-10-CM

## 2021-05-24 DIAGNOSIS — Z71.89 ENCOUNTER FOR PRE-BARIATRIC SURGERY COUNSELING AND EDUCATION: ICD-10-CM

## 2021-05-24 DIAGNOSIS — Z30.9 ENCOUNTER FOR CONTRACEPTIVE MANAGEMENT, UNSPECIFIED TYPE: ICD-10-CM

## 2021-05-24 DIAGNOSIS — E66.01 MORBID OBESITY WITH BMI OF 40.0-44.9, ADULT (HCC): ICD-10-CM

## 2021-05-24 DIAGNOSIS — Z83.3 FAMILY HISTORY OF DIABETES MELLITUS: ICD-10-CM

## 2021-05-24 DIAGNOSIS — G89.29 CHRONIC BILATERAL THORACIC BACK PAIN: ICD-10-CM

## 2021-05-24 DIAGNOSIS — J45.50 SEVERE PERSISTENT ASTHMA WITHOUT COMPLICATION: ICD-10-CM

## 2021-05-24 DIAGNOSIS — R53.83 FATIGUE, UNSPECIFIED TYPE: ICD-10-CM

## 2021-05-24 DIAGNOSIS — G47.30 SLEEP APNEA, UNSPECIFIED TYPE: Primary | ICD-10-CM

## 2021-05-24 LAB
25(OH)D3 SERPL-MCNC: 11.3 NG/ML (ref 30–100)
ALBUMIN SERPL-MCNC: 4.2 G/DL (ref 3.5–5.2)
ALBUMIN/GLOB SERPL: 1.8 G/DL
ALP SERPL-CCNC: 50 U/L (ref 39–117)
ALT SERPL W P-5'-P-CCNC: 20 U/L (ref 1–33)
ANION GAP SERPL CALCULATED.3IONS-SCNC: 11.2 MMOL/L (ref 5–15)
AST SERPL-CCNC: 12 U/L (ref 1–32)
BILIRUB SERPL-MCNC: 0.3 MG/DL (ref 0–1.2)
BUN SERPL-MCNC: 9 MG/DL (ref 6–20)
BUN/CREAT SERPL: 16.7 (ref 7–25)
CALCIUM SPEC-SCNC: 8.9 MG/DL (ref 8.6–10.5)
CHLORIDE SERPL-SCNC: 106 MMOL/L (ref 98–107)
CO2 SERPL-SCNC: 24.8 MMOL/L (ref 22–29)
CREAT SERPL-MCNC: 0.54 MG/DL (ref 0.57–1)
EXPIRATION DATE: NORMAL
GFR SERPL CREATININE-BSD FRML MDRD: 138 ML/MIN/1.73
GLOBULIN UR ELPH-MCNC: 2.3 GM/DL
GLUCOSE SERPL-MCNC: 67 MG/DL (ref 65–99)
HBA1C MFR BLD: 5.6 %
HCV AB SER DONR QL: NORMAL
Lab: NORMAL
POTASSIUM SERPL-SCNC: 3.5 MMOL/L (ref 3.5–5.2)
PROT SERPL-MCNC: 6.5 G/DL (ref 6–8.5)
SODIUM SERPL-SCNC: 142 MMOL/L (ref 136–145)

## 2021-05-24 PROCEDURE — 99214 OFFICE O/P EST MOD 30 MIN: CPT | Performed by: PHYSICIAN ASSISTANT

## 2021-05-24 PROCEDURE — 80050 GENERAL HEALTH PANEL: CPT | Performed by: PHYSICIAN ASSISTANT

## 2021-05-24 PROCEDURE — 85007 BL SMEAR W/DIFF WBC COUNT: CPT | Performed by: PHYSICIAN ASSISTANT

## 2021-05-24 PROCEDURE — 83036 HEMOGLOBIN GLYCOSYLATED A1C: CPT | Performed by: PHYSICIAN ASSISTANT

## 2021-05-24 PROCEDURE — 82306 VITAMIN D 25 HYDROXY: CPT | Performed by: PHYSICIAN ASSISTANT

## 2021-05-24 PROCEDURE — 86803 HEPATITIS C AB TEST: CPT | Performed by: PHYSICIAN ASSISTANT

## 2021-05-24 PROCEDURE — 84439 ASSAY OF FREE THYROXINE: CPT | Performed by: PHYSICIAN ASSISTANT

## 2021-05-24 RX ORDER — MONTELUKAST SODIUM 10 MG/1
10 TABLET ORAL NIGHTLY
Qty: 30 TABLET | Refills: 5 | Status: SHIPPED | OUTPATIENT
Start: 2021-05-24 | End: 2022-04-27 | Stop reason: SDUPTHER

## 2021-05-24 RX ORDER — ALBUTEROL SULFATE 2.5 MG/3ML
2.5 SOLUTION RESPIRATORY (INHALATION) EVERY 4 HOURS PRN
Qty: 120 EACH | Refills: 5 | Status: ON HOLD | OUTPATIENT
Start: 2021-05-24 | End: 2022-04-25 | Stop reason: SDUPTHER

## 2021-05-24 RX ORDER — ALBUTEROL SULFATE 2.5 MG/3ML
2.5 SOLUTION RESPIRATORY (INHALATION)
COMMUNITY
Start: 2020-01-05 | End: 2021-05-24 | Stop reason: SDUPTHER

## 2021-05-24 RX ORDER — PREDNISONE 20 MG/1
TABLET ORAL
COMMUNITY
Start: 2021-05-18 | End: 2021-06-17

## 2021-05-24 NOTE — PROGRESS NOTES
Chief Complaint   Patient presents with   • Establish Care     Previous [facility] pt   • Asthma     Discuss referral pulmonology, worsening, SOA at night, discuss sleep apnea   • Back Pain     years, due to large breast, worse at night, nsaids not helping, constamt aches   • Obesity     Disucss steriod use makes it hard to lose weight       Subjective       History of Present Illness     Yoli Regan is a 26 y.o. female. She presents as a new patient to establish care. Current concerns include asthma, fatigue, weight issues, and heavy breasts. Pt with asthma since childhood. She is currently using nebulizer tx and Singulair. She has Dulera at home but has not been using as consistently, does not work as well as her previous Symbicort which was no longer covered by insurance. She reports several severe flares in asthma per year, and most recently was seen at [facility] last week for an asthma flare. She finished steroids last night from ED visit. She reports SOA, wheezing and cough every day, and nighttime sx are worse. She also reports witnessed episodes of apnea during hospitalizations, and reports O2 sats can get into low 80s during these episodes while she's monitored in the hospital. She has never been evaluated for sleep apnea in the past.     Pt also reports fatigue, ongoing for >1 year. She doesn't sleep well, 4-5 hours interrupted per night which is exacerbated by untreated sleep apnea.     She also has trouble with her weight. She reports weight difficulties for much of her life, but has been worse as she has gotten older. She feels repeat steroid use for poorly controlled asthma has further contributed to this. She has tried multiple diets including keto, low carb, intermittent fasting with little success. She is interested in bariatric surgery and also nutrition referral.     She reports heavy, painful breasts which also cause her back pain. This has also been an issue for years, since early high  school. She has deep grooves in her shoulders at the end of the day from her bra. She also gets frequent yeast rash beneath her breasts, but not as bad right now. She wears size H bra. She is wondering if she would be a candidate for breast reduction surgery.    She also would like to consider form of birth control. She has never been on birth control in the past. She has one daughter and is not interested in having further children right now. She does report irregular menses with only 1-2 periods per year, and this has been unchanged since she first began menarche at age 12.       Are you currently seeing any other doctors or specialists? No   Are you currently taking any OTC medications or herbal medications? No     Sleep: 4-5 hours, interrupted   Diet: fairly healthy, per pt  Exercise: no regular exercise routine, stays active with work    Most recent colonoscopy: n/a  Most recent mammogram: n/a  Most recent pap smear: 4 years ago  First day of last menses: irregular, 1-2 menses per year, reports she has never been normal with menses. Menarche at age 12.       The following portions of the patient's history were reviewed and updated as appropriate: allergies, current medications, past family history, past medical history, past social history, past surgical history and problem list.    No Known Allergies  Social History     Tobacco Use   • Smoking status: Never Smoker   • Smokeless tobacco: Never Used   Substance Use Topics   • Alcohol use: No     Past Surgical History:   Procedure Laterality Date   • CHOLECYSTECTOMY       Family History   Problem Relation Age of Onset   • Asthma Mother    • Sleep apnea Mother    • Diabetes type II Father    • Coronary artery disease Maternal Grandmother    • Heart attack Maternal Grandmother    • Asthma Paternal Grandmother    • Hypertension Paternal Grandmother          Current Outpatient Medications:   •  albuterol (PROVENTIL) (2.5 MG/3ML) 0.083% nebulizer solution, Take 2.5 mg  by nebulization Every 4 (Four) Hours As Needed for Wheezing., Disp: 120 each, Rfl: 5  •  mometasone-formoterol (Dulera) 100-5 MCG/ACT inhaler, Inhale 2 puffs 2 (Two) Times a Day., Disp: 13 g, Rfl: 5  •  montelukast (SINGULAIR) 10 MG tablet, Take 1 tablet by mouth Every Night., Disp: 30 tablet, Rfl: 5  •  predniSONE (DELTASONE) 20 MG tablet, , Disp: , Rfl:   •  ipratropium-albuterol (DUO-NEB) 0.5-2.5 mg/3 ml nebulizer, Take 3 mL by nebulization Every 6 (Six) Hours As Needed for Wheezing., Disp: 360 mL, Rfl: 0  •  omeprazole OTC (PRILOSEC OTC) 20 MG EC tablet, Take 1 tablet by mouth Daily., Disp: 30 tablet, Rfl: 1    Patient Active Problem List   Diagnosis   • Environmental allergies   • Severe persistent asthma without complication   • Morbid obesity (CMS/HCC)   • GERD (gastroesophageal reflux disease)       Review of Systems   Constitutional: Positive for fatigue and unexpected weight gain. Negative for chills and fever.   HENT: Negative for congestion, ear pain, sore throat and trouble swallowing.    Eyes: Negative for pain.   Respiratory: Positive for cough, chest tightness, shortness of breath and wheezing.    Cardiovascular: Negative for chest pain and palpitations.   Gastrointestinal: Negative for abdominal pain, diarrhea, nausea and vomiting.   Endocrine: Negative for cold intolerance and heat intolerance.   Genitourinary: Positive for breast pain (heavy, painful breasts) and menstrual problem. Negative for dysuria and hematuria.   Musculoskeletal: Positive for back pain.   Skin: Negative for rash.   Allergic/Immunologic: Negative for immunocompromised state.   Neurological: Negative for dizziness, syncope, weakness and headache.   Psychiatric/Behavioral: Negative for depressed mood. The patient is not nervous/anxious.        Objective   Vitals:    05/24/21 1012   BP: 122/78   Pulse: 84   Resp: 16   Temp: 98 °F (36.7 °C)   SpO2: 98%     Physical Exam  Constitutional:       Appearance: Normal appearance. She is  well-developed. She is morbidly obese.   HENT:      Head: Normocephalic and atraumatic.      Right Ear: Tympanic membrane, ear canal and external ear normal.      Left Ear: Tympanic membrane, ear canal and external ear normal.      Nose: Nose normal.      Mouth/Throat:      Mouth: Mucous membranes are moist.      Pharynx: Oropharynx is clear.   Eyes:      Conjunctiva/sclera: Conjunctivae normal.   Neck:      Thyroid: No thyromegaly.      Vascular: No carotid bruit.   Cardiovascular:      Rate and Rhythm: Normal rate and regular rhythm.      Heart sounds: No murmur heard.     Pulmonary:      Effort: Pulmonary effort is normal.      Breath sounds: Normal breath sounds. No wheezing or rales.   Chest:      Comments: +deep grooves in shoulders from bra   Abdominal:      Tenderness: There is no abdominal tenderness.   Musculoskeletal:      Cervical back: Neck supple.   Lymphadenopathy:      Cervical: No cervical adenopathy.   Skin:     Findings: No rash.   Psychiatric:         Behavior: Behavior normal.               Assessment/Plan   Diagnoses and all orders for this visit:    1. Sleep apnea, unspecified type (Primary)  -     Ambulatory Referral to Pulmonology  -     CBC & Differential  -     Comprehensive Metabolic Panel    2. Severe persistent asthma without complication  -     Ambulatory Referral to Pulmonology  -     albuterol (PROVENTIL) (2.5 MG/3ML) 0.083% nebulizer solution; Take 2.5 mg by nebulization Every 4 (Four) Hours As Needed for Wheezing.  Dispense: 120 each; Refill: 5  -     montelukast (SINGULAIR) 10 MG tablet; Take 1 tablet by mouth Every Night.  Dispense: 30 tablet; Refill: 5  -     mometasone-formoterol (Dulera) 100-5 MCG/ACT inhaler; Inhale 2 puffs 2 (Two) Times a Day.  Dispense: 13 g; Refill: 5  -     CBC & Differential  -     Comprehensive Metabolic Panel    3. Family history of diabetes mellitus  -     CBC & Differential  -     Comprehensive Metabolic Panel  -     POC Glycosylated Hemoglobin (Hb  A1C)    4. Elevated glucose  -     CBC & Differential  -     Comprehensive Metabolic Panel  -     POC Glycosylated Hemoglobin (Hb A1C)    5. Encounter for contraceptive management, unspecified type  -     Ambulatory Referral to Obstetrics / Gynecology  -     CBC & Differential  -     Comprehensive Metabolic Panel    6. Morbid obesity with BMI of 40.0-44.9, adult (CMS/[facility])  -     Ambulatory Referral to Bariatric Surgery  -     T4, Free  -     TSH    7. Encounter for pre-bariatric surgery counseling and education  -     Ambulatory Referral to Bariatric Surgery    8. Encounter for hepatitis C screening test for low risk patient  -     Hepatitis C Antibody    9. Fatigue, unspecified type  -     CBC & Differential  -     Comprehensive Metabolic Panel  -     T4, Free  -     TSH  -     Vitamin D 25 Hydroxy    10. Painful breasts  - Discussed that she is likely not a candidate at this time, but may be in the future after weight loss. I would suggest we pursue weight loss first, whether through bariatrics or nutrition services and then consider breast reduction in the future. I am happy to write letter of support in the future.     11. Chronic bilateral thoracic back pain      Pt with poorly controlled asthma and untreated sleep apnea. I feel if we can get her asthma under better control, she will not need PO steroids as often which is likely contributing to obesity although obesity multifactorial. She is in agreement to see pulmonology. I have advised her to resume BID Dulera use as well which she has not been consistent with lately, and may need change to another inhaler from pulm.   We will begin q4week visits as preparation for bariatrics if she decides to proceed with this.              Return in about 5 weeks (around 6/28/2021) for Follow up- schedule with me at next available appt .

## 2021-05-25 LAB
DEPRECATED RDW RBC AUTO: 38.7 FL (ref 37–54)
ERYTHROCYTE [DISTWIDTH] IN BLOOD BY AUTOMATED COUNT: 14.8 % (ref 12.3–15.4)
HCT VFR BLD AUTO: 42.8 % (ref 34–46.6)
HGB BLD-MCNC: 13.5 G/DL (ref 12–15.9)
LYMPHOCYTES # BLD MANUAL: 3.83 10*3/MM3 (ref 0.7–3.1)
LYMPHOCYTES NFR BLD MANUAL: 29 % (ref 19.6–45.3)
LYMPHOCYTES NFR BLD MANUAL: 3 % (ref 5–12)
MCH RBC QN AUTO: 23.4 PG (ref 26.6–33)
MCHC RBC AUTO-ENTMCNC: 31.5 G/DL (ref 31.5–35.7)
MCV RBC AUTO: 74.3 FL (ref 79–97)
MICROCYTES BLD QL: ABNORMAL
MONOCYTES # BLD AUTO: 0.4 10*3/MM3 (ref 0.1–0.9)
NEUTROPHILS # BLD AUTO: 8.99 10*3/MM3 (ref 1.7–7)
NEUTROPHILS NFR BLD MANUAL: 68 % (ref 42.7–76)
PLAT MORPH BLD: NORMAL
PLATELET # BLD AUTO: 368 10*3/MM3 (ref 140–450)
PMV BLD AUTO: 12.1 FL (ref 6–12)
POIKILOCYTOSIS BLD QL SMEAR: ABNORMAL
RBC # BLD AUTO: 5.76 10*6/MM3 (ref 3.77–5.28)
T4 FREE SERPL-MCNC: 0.94 NG/DL (ref 0.93–1.7)
TSH SERPL DL<=0.05 MIU/L-ACNC: 3.67 UIU/ML (ref 0.27–4.2)
WBC # BLD AUTO: 13.22 10*3/MM3 (ref 3.4–10.8)
WBC MORPH BLD: NORMAL

## 2021-06-10 ENCOUNTER — TELEPHONE (OUTPATIENT)
Dept: INTERNAL MEDICINE | Facility: CLINIC | Age: 26
End: 2021-06-10

## 2021-06-10 ENCOUNTER — HOSPITAL ENCOUNTER (OUTPATIENT)
Dept: NUTRITION | Facility: HOSPITAL | Age: 26
Setting detail: RECURRING SERIES
Discharge: HOME | End: 2021-06-10

## 2021-06-10 VITALS — BODY MASS INDEX: 43.59 KG/M2 | HEIGHT: 63 IN | WEIGHT: 246 LBS

## 2021-06-10 DIAGNOSIS — E55.9 VITAMIN D DEFICIENCY: Primary | ICD-10-CM

## 2021-06-10 PROCEDURE — 97802 MEDICAL NUTRITION INDIV IN: CPT | Performed by: DIETITIAN, REGISTERED

## 2021-06-10 NOTE — CONSULTS
"Adult Outpatient Nutrition  Assessment/PES    Patient Name:  Yoli Regan  YOB: 1995  MRN: [id number]    Assessment Date:  6/10/2021    Telephone nutrition consult, 60 minutes. This medical referred consult was provided as a telephone call, as patient is unable to attend an in-office appointment due to the COVID-19 crisis. Consent for treatment was given verbally.    Comments: Patient is present for nutrition counseling related to weight loss. Medical hx includes asthma and is also being assessed for sleep apnea. Patient is wanting the gastric sleeve procedure and has a desire to work toward a healthier lifestyle. She has attempted to lose weight with a low carbohydrate, ketogenic diet, but did not see sustainable results. Says it has been much harder to lose weight in the past few years due to being \"on a heavy amount\" of steroids for her asthma. Patient describes problems with snacking. States she often will only have 1 main meal in the evening and otherwise snacks throughout the day. Does not eat during the morning hours. Reports portions are not large, but admits some of her snack choices are not the healthiest - prefers savory items such as cheez-its, chips, pretzels, crackers, etc. Patient does like a variety of foods and is interested in healthy dinner options to cook at home. Does not eat out very often. For exercise, patient likes to walk with her family. Reports her job is also physically demanding. Today patient wants to obtain information on a meal plan and healthy food choices for weight loss. Weight from referral is 112 kg (246 lbs). Patient has no barriers to learning. Health literacy assessment not completed today.     The instructional process includes the plate method, nutrition label, meal planning, portions, restaurant nutrition, food record keeping, and exercise. Estimated energy needs for weight loss is 1,700 calories per day. Discussed the importance of a regular meal " pattern and not skipping any meals. Patient is open to continuing a small, more frequent meal pattern while adding in something to eat in the morning either before work or on her first break. We looked at quick options such as fruit w PB, yogurt, cottage cheese w fruit, protein bar, cheese and ww crackers, protein shakes. Patient likes the idea of protein shakes because it would be easy to keep with her and drink in addition to her water. Patient does have good access to keep other snacks at work to have throughout the day. We reviewed other options such as veggies, skinny pop, tuna w crackers, rice cakes, etc. RD will send snack handouts to patient. RD encouraged being mindful of her portions of snacks. For dinners, we discussed using the plate method as a guide. Patient is familiar with the visual and says she often gets pretty close with her meals, but says she can increase her vegetable portions. RD will also send ADA and AHA websites for finding other healthy meal options.  Patient plans to focus on making healthier choices for her snacks and eating in the morning as her starting goals. She appears receptive to our discussion and motivated to make changes.     Consent given to e-mail materials, including [facility] Nostalgia Bingo Weight Loss Toolkit, 1,700 kcal meal plan and sample menu, and supporting nutrition education materials. Patient does request information on food assistance programs so that will be included as well.    Goals:  1. Eat in the morning during the work week.   2. Healthier snack choices.   3. Gradual weight loss.     Total of 50 minutes spent with patient on nutrition counseling. Education based on Academy of Dietetics and Nutrition guidelines. Patient was provided with RD's contact information. Follow up visit is scheduled for 7/15 at 3:30 p.m. Thank you for this referral.    General Info       Row Name 06/10/21 1100       Today's Session    Person(s) attending today's session  Patient     " Services Used Today?  No       General Information    How Well Do You Speak English?  very well    Are you able to read and write English?  Yes    Is patient pregnant?  no          Physical Findings       Row Name 06/10/21 1100          Physical Findings    Overall Physical Appearance  obese           Anthropometrics       Row Name 06/10/21 1100          Anthropometrics    Height  158.8 cm (62.5\")     Weight  112 kg (246 lb)        Ideal Body Weight (IBW)    Ideal Body Weight (IBW) (kg)  51.58     % Ideal Body Weight  216.35        Body Mass Index (BMI)    BMI (kg/m2)  44.37           Nutritional Info/Activity       Row Name 06/10/21 1101       Nutritional Information    Have you had weight changes?  Yes    Describe weight changes  Gain weight but never lose weight    Have you tried to lose weight before?  Yes    List programs tried, date, and success  Keto - no long term success    What is your motivation to lose weight?  Health, prepare for gastric sleeve    Supplemental Drinks/Foods/Additives  None    History of eating disorder?  No    What cultural diet influences are important for you to follow?  None    Do you have difficulty chewing food?  No    Functional Status  able to prepare meals;able to purchase food;ambulatory    How often during the day do you find yourself snacking?  frequent    Food Behaviors  Continuous snacking    How often do you eat out and where?  Not often    Do you use Food Assistance programs (WIC, food stamps, food bank)?  no    Do you need information about Food Assistance programs?  yes    How many times do you drink milk per day?  0    How many times do you eat fruit per day?  0    How many times do you eat vegetables per day?  0    How many times do you drink juice per day?  0    How many times do you eat candy/chocolates per day?  0    How many times do you eat baked goods per day?  0    How many times do you eat desserts per day?  0    How many times do you eat ice cream " "per day?  0    How many times do you eat snack foods per day?  4    How many diet sodas do you drink per day?  0    How many regular sodas do you drink per day?  0    How many times do you eat ethnic food per day?  0    How many times do you drink alcohol per day?  0    How many times do you have caffeine per day?  0    How many servings of artificial sweetner do you have per day?  0    How many meals do you eat each day?  1 1-2    How many snacks do you eat each day?  4    What is the biggest challenge you have with your diet?  Other (comment) snacking too much    Enter everything you can remember eating in the last 24 hours (1 day)  Breakfast: None; Lunch: Slice of pepperoni pizza; PM snack: hot fries; Dinner: Shrimp pasta       Eating Environment    Eating environment  Family;Work       Physical Activity    Are you currently involved in an activity/exercise program?   Yes    Describe physical activity  Walking          Home Nutrition Report       Row Name 06/10/21 1101          Home Nutrition Report    Supplemental Drinks/Foods/Additives  None           Estimated/Assessed Needs       Row Name 06/10/21 1100          Calculation Measurements    Weight Used For Calculations  112 kg (246 lb)     Height  158.8 cm (62.5\")        Estimated/Assessed Needs    Additional Documentation  Kearsarge-St. Jeor Equation (Group)        Kearsarge-St. Jeor Equation    RMR (Kearsarge-St. Jeor Equation)  1822.0375     Kearsarge-St. Jeor Activity Factors  1.2     Activity Factors (Kearsarge-St. Jeor)  3877.064                   Problem/Interventions:  Problem 1       Row Name 06/10/21 1131          Nutrition Diagnoses Problem 1    Problem 1  Overweight/Obesity     Etiology (related to)  Factors Affecting Nutrition     Food Habit/Preferences  Other frequent snacking     Signs/Symptoms (evidenced by)  BMI     BMI  Greater than 40                   Intervention Goal       Row Name 06/10/21 1138          Intervention Goal    General  Meet " nutritional needs for age/condition     PO  Meet estimated needs     Weight  Appropriate weight loss             Nutrition Prescription       Row Name 06/10/21 1139          Nutrition Prescription PO    PO Prescription  Begin/change diet     Begin/Change Diet to  Regular     Fluid Consistency  Thin     New PO Prescription Ordered?  No, recommended           Education/Evaluation       Row Name 06/10/21 1140          Education    Education  Education topics;Provided education regarding;Advised regarding habits/behavior     Provided education regarding  Nutrition for age     Education Topics  Basic nutrition;Gradual weight loss     Advised Regarding Habits/Behavior  Eating pattern;Food choices;Snacks        Monitor/Evaluation    Monitor  Per protocol     Education Follow-up  Other (comment)             Electronically signed by:  Desirae Mares RD  06/10/21 11:50 EDT

## 2021-06-10 NOTE — TELEPHONE ENCOUNTER
Please call pt and let her know her WBC is elevated, which could certainly be related to uncontrolled asthma + obesity. We will continue to monitor at her routine visits. Her Vit D is also extremely low which is likely contributing to fatigue. I have sent in Vit D supplement for her to take once weekly. Otherwise continue same plan.

## 2021-06-11 NOTE — TELEPHONE ENCOUNTER
Yoli Regan [phone]  Spoke to pt, advised of clinical results. Pt's in agreement with plan. Good verbal understanding.

## 2021-06-14 ENCOUNTER — CLINICAL SUPPORT NO REQUIREMENTS (OUTPATIENT)
Dept: PULMONOLOGY | Facility: CLINIC | Age: 26
End: 2021-06-14

## 2021-06-14 ENCOUNTER — TELEPHONE (OUTPATIENT)
Dept: INTERNAL MEDICINE | Facility: CLINIC | Age: 26
End: 2021-06-14

## 2021-06-14 DIAGNOSIS — Z01.812 BLOOD TESTS PRIOR TO TREATMENT OR PROCEDURE: Primary | ICD-10-CM

## 2021-06-14 LAB — SARS-COV-2 RNA NOSE QL NAA+PROBE: NOT DETECTED

## 2021-06-14 PROCEDURE — U0004 COV-19 TEST NON-CDC HGH THRU: HCPCS | Performed by: INTERNAL MEDICINE

## 2021-06-14 PROCEDURE — 99211 OFF/OP EST MAY X REQ PHY/QHP: CPT | Performed by: INTERNAL MEDICINE

## 2021-06-17 ENCOUNTER — OFFICE VISIT (OUTPATIENT)
Dept: PULMONOLOGY | Facility: CLINIC | Age: 26
End: 2021-06-17

## 2021-06-17 VITALS
HEIGHT: 63 IN | OXYGEN SATURATION: 97 % | TEMPERATURE: 98.2 F | WEIGHT: 245 LBS | SYSTOLIC BLOOD PRESSURE: 124 MMHG | DIASTOLIC BLOOD PRESSURE: 74 MMHG | BODY MASS INDEX: 43.41 KG/M2 | HEART RATE: 86 BPM

## 2021-06-17 DIAGNOSIS — R06.02 SHORTNESS OF BREATH: ICD-10-CM

## 2021-06-17 DIAGNOSIS — R06.02 SHORTNESS OF BREATH: Primary | ICD-10-CM

## 2021-06-17 DIAGNOSIS — J45.50 SEVERE PERSISTENT ASTHMA WITHOUT COMPLICATION: Primary | ICD-10-CM

## 2021-06-17 PROCEDURE — 86003 ALLG SPEC IGE CRUDE XTRC EA: CPT | Performed by: INTERNAL MEDICINE

## 2021-06-17 PROCEDURE — 85025 COMPLETE CBC W/AUTO DIFF WBC: CPT | Performed by: INTERNAL MEDICINE

## 2021-06-17 PROCEDURE — 83520 IMMUNOASSAY QUANT NOS NONAB: CPT | Performed by: INTERNAL MEDICINE

## 2021-06-17 PROCEDURE — 94375 RESPIRATORY FLOW VOLUME LOOP: CPT | Performed by: INTERNAL MEDICINE

## 2021-06-17 PROCEDURE — 82785 ASSAY OF IGE: CPT | Performed by: INTERNAL MEDICINE

## 2021-06-17 PROCEDURE — 99204 OFFICE O/P NEW MOD 45 MIN: CPT | Performed by: INTERNAL MEDICINE

## 2021-06-17 PROCEDURE — 94726 PLETHYSMOGRAPHY LUNG VOLUMES: CPT | Performed by: INTERNAL MEDICINE

## 2021-06-17 PROCEDURE — 94729 DIFFUSING CAPACITY: CPT | Performed by: INTERNAL MEDICINE

## 2021-06-17 PROCEDURE — 86256 FLUORESCENT ANTIBODY TITER: CPT | Performed by: INTERNAL MEDICINE

## 2021-06-17 RX ORDER — MOMETASONE FUROATE AND FORMOTEROL FUMARATE DIHYDRATE 200; 5 UG/1; UG/1
2 AEROSOL RESPIRATORY (INHALATION)
Qty: 13 G | Refills: 5 | Status: SHIPPED | OUTPATIENT
Start: 2021-06-17 | End: 2022-05-26 | Stop reason: ALTCHOICE

## 2021-06-17 NOTE — PROGRESS NOTES
New Patient Pulmonary Office Visit      Patient Name: Yoli Regan    Referring Physician: Cora Hutchinson PA-C    Chief Complaint:    Chief Complaint   Patient presents with   • Asthma       History of Present Illness: Yoli Regan is a 26 y.o. female who is here today to establish care with Pulmonary. Patient is a past medical history significant for asthma. Who presents to pulmonary for evaluation of shortness of breath.  Patient states she has had asthma since childhood when she was younger but ultimately improved around 20 to 21 years which did have recurrent infections was having been hospitalized on multiple occasions and multiple doses of steroids.  She has since been finally put on Dulera 100 mcg 2 puffs twice daily continues to have recurrent exacerbations most notably she has had 5 rounds of steroids in the last year in July 1.  Last course was 2 months ago.  States that she is triggered by multiple allergens.  She states that she does get a significant benefit when she utilizes her albuterol.  She denies any fever, chills, nausea, or vomiting.  She also notes that she has gained a significant amount of weight over the course of last few years and try to get weight loss reduction surgery, but later breathing to be under better control before they consider it.  She has no other complaints.    Review of Systems:   Review of Systems   Constitutional: Negative for activity change, appetite change, chills and diaphoresis.   HENT: Negative for congestion, postnasal drip, sinus pressure and voice change.    Eyes: Negative for blurred vision.   Respiratory: Positive for chest tightness, shortness of breath and wheezing. Negative for cough.    Cardiovascular: Negative for chest pain.   Gastrointestinal: Negative for abdominal pain.   Musculoskeletal: Negative for myalgias.   Skin: Negative for color change and dry skin.   Allergic/Immunologic: Negative for environmental allergies.    Neurological: Negative for weakness and confusion.   Hematological: Negative for adenopathy.   Psychiatric/Behavioral: Negative for sleep disturbance and depressed mood.       Past Medical History:   Past Medical History:   Diagnosis Date   • Asthma        Past Surgical History:   Past Surgical History:   Procedure Laterality Date   • CHOLECYSTECTOMY         Family History:   Family History   Problem Relation Age of Onset   • Asthma Mother    • Sleep apnea Mother    • Diabetes type II Father    • Coronary artery disease Maternal Grandmother    • Heart attack Maternal Grandmother    • Asthma Paternal Grandmother    • Hypertension Paternal Grandmother        Social History:   Social History     Socioeconomic History   • Marital status: Single     Spouse name: Not on file   • Number of children: Not on file   • Years of education: Not on file   • Highest education level: Not on file   Tobacco Use   • Smoking status: Never Smoker   • Smokeless tobacco: Never Used   Substance and Sexual Activity   • Alcohol use: No   • Drug use: No       Medications:     Current Outpatient Medications:   •  albuterol (PROVENTIL) (2.5 MG/3ML) 0.083% nebulizer solution, Take 2.5 mg by nebulization Every 4 (Four) Hours As Needed for Wheezing., Disp: 120 each, Rfl: 5  •  Cholecalciferol 1.25 MG (24786 UT) tablet, Take 1 tablet by mouth 1 (One) Time Per Week., Disp: 5 tablet, Rfl: 6  •  ipratropium-albuterol (DUO-NEB) 0.5-2.5 mg/3 ml nebulizer, Take 3 mL by nebulization Every 6 (Six) Hours As Needed for Wheezing., Disp: 360 mL, Rfl: 0  •  montelukast (SINGULAIR) 10 MG tablet, Take 1 tablet by mouth Every Night., Disp: 30 tablet, Rfl: 5  •  mometasone-formoterol (Dulera) 200-5 MCG/ACT inhaler, Inhale 2 puffs 2 (Two) Times a Day., Disp: 13 g, Rfl: 5  •  omeprazole OTC (PRILOSEC OTC) 20 MG EC tablet, Take 1 tablet by mouth Daily., Disp: 30 tablet, Rfl: 1    Allergies:   No Known Allergies    Physical Exam:  Vital Signs:   Vitals:    06/17/21  "1531   BP: 124/74   Pulse: 86   Temp: 98.2 °F (36.8 °C)   SpO2: 97%  Comment: resting, room air   Weight: 111 kg (245 lb)   Height: 158.8 cm (62.5\")       Physical Exam  Vitals and nursing note reviewed.   Constitutional:       General: She is not in acute distress.     Appearance: She is well-developed and normal weight. She is not ill-appearing or toxic-appearing.   HENT:      Head: Normocephalic and atraumatic.   Cardiovascular:      Rate and Rhythm: Normal rate and regular rhythm.      Pulses: Normal pulses.      Heart sounds: Normal heart sounds. No murmur heard.   No friction rub. No gallop.    Pulmonary:      Effort: Pulmonary effort is normal. No respiratory distress.      Breath sounds: Normal breath sounds. No wheezing, rhonchi or rales.   Musculoskeletal:      Right lower leg: No edema.      Left lower leg: No edema.   Skin:     General: Skin is warm and dry.   Neurological:      Mental Status: She is alert and oriented to person, place, and time.         Immunization History   Administered Date(s) Administered   • Flu Vaccine Split Quad 10/19/2016, 11/11/2020   • Flulaval/Fluarix/Fluzone Quad 11/29/2018, 01/09/2020, 11/11/2020   • HPV Quadrivalent 03/24/2009, 05/29/2009   • Hep A, 2 Dose 02/24/2009   • Hepatitis B Vaccine Adult IM 11/29/2018, 01/09/2020   • Meningococcal MCV4P (Menactra) 02/24/2009   • Pneumococcal Polysaccharide (PPSV23) 10/19/2016, 11/11/2020   • Tdap 02/24/2009   • Varicella 02/24/2009       Results Review:   - I personally reviewed the pts imaging from chest x-ray from 6/17/2021 shows no acute cardiopulmonary process  - I personally reviewed the pts PFT from 6/17/2021 showed no obstruction or restriction with a nonspecific reduction in FEV1 and FVC respectively, and a normal DLCO.  - I personally reviewed the pts chart.    Assessment / Plan:   1. Severe persistent asthma without complication (Primary)  2. Shortness of breath  -For the patient's asthma I am going to go ahead and " increase of her Dulera to 200 mcg 2 puffs twice daily in addition to that pulmonary continue on the Singulair and utilize albuterol on a as needed basis.  I will do a work-up for secondary causes of asthma with the labs as listed below.  Do think weight loss would be extremely helpful in overall asthma control for the patient, hopefully we get her under control over the next few months so by the wintertime she is able to undergo surgery.  She has to be off the steroids under good control prior to undergoing any weight loss reduction surgery.  If need to you at the next visit continues have symptoms despite being on high-dose Dulera I will likely start her on biologic therapy based upon her labs today.    Labs ordered today:  -     Allergens, Zone 8  -     ANCA Panel  -     Aspergillus Fumigatus IgE  -     CBC & Differential  -     IgE    Follow Up:   Return in about 3 months (around 9/17/2021).     KAILEY Dorman, DO  Pulmonary and Critical Care Medicine  Note Electronically Signed    Please note that portions of this note may have been completed with a voice recognition program. Efforts were made to edit the dictations, but occasionally words are mistranscribed.

## 2021-06-18 LAB
BASOPHILS # BLD AUTO: 0.07 10*3/MM3 (ref 0–0.2)
BASOPHILS NFR BLD AUTO: 1 % (ref 0–1.5)
DEPRECATED RDW RBC AUTO: 40 FL (ref 37–54)
EOSINOPHIL # BLD AUTO: 0.16 10*3/MM3 (ref 0–0.4)
EOSINOPHIL NFR BLD AUTO: 2.3 % (ref 0.3–6.2)
ERYTHROCYTE [DISTWIDTH] IN BLOOD BY AUTOMATED COUNT: 15.3 % (ref 12.3–15.4)
HCT VFR BLD AUTO: 47.6 % (ref 34–46.6)
HGB BLD-MCNC: 14.5 G/DL (ref 12–15.9)
IMM GRANULOCYTES # BLD AUTO: 0.03 10*3/MM3 (ref 0–0.05)
IMM GRANULOCYTES NFR BLD AUTO: 0.4 % (ref 0–0.5)
LYMPHOCYTES # BLD AUTO: 2.06 10*3/MM3 (ref 0.7–3.1)
LYMPHOCYTES NFR BLD AUTO: 29.6 % (ref 19.6–45.3)
MCH RBC QN AUTO: 23.1 PG (ref 26.6–33)
MCHC RBC AUTO-ENTMCNC: 30.5 G/DL (ref 31.5–35.7)
MCV RBC AUTO: 75.9 FL (ref 79–97)
MONOCYTES # BLD AUTO: 0.69 10*3/MM3 (ref 0.1–0.9)
MONOCYTES NFR BLD AUTO: 9.9 % (ref 5–12)
NEUTROPHILS NFR BLD AUTO: 3.94 10*3/MM3 (ref 1.7–7)
NEUTROPHILS NFR BLD AUTO: 56.8 % (ref 42.7–76)
NRBC BLD AUTO-RTO: 0 /100 WBC (ref 0–0.2)
PLATELET # BLD AUTO: 386 10*3/MM3 (ref 140–450)
PMV BLD AUTO: 11.6 FL (ref 6–12)
RBC # BLD AUTO: 6.27 10*6/MM3 (ref 3.77–5.28)
WBC # BLD AUTO: 6.95 10*3/MM3 (ref 3.4–10.8)

## 2021-06-21 LAB
C-ANCA TITR SER IF: NORMAL TITER
MYELOPEROXIDASE AB SER IA-ACNC: <9 U/ML (ref 0–9)
P-ANCA ATYPICAL TITR SER IF: NORMAL TITER
P-ANCA TITR SER IF: NORMAL TITER
PROTEINASE3 AB SER IA-ACNC: <3.5 U/ML (ref 0–3.5)

## 2021-06-25 LAB
A FUMIGATUS IGE QN: 0.22 KU/L
IGE SERPL-ACNC: 17 IU/ML (ref 6–495)

## 2021-06-30 LAB
A ALTERNATA IGE QN: <0.1 KU/L
A FUMIGATUS IGE QN: 0.23 KU/L
AMER ROACH IGE QN: <0.1 KU/L
BAHIA GRASS IGE QN: <0.1 KU/L
BERMUDA GRASS IGE QN: <0.1 KU/L
BOXELDER IGE QN: <0.1 KU/L
C HERBARUM IGE QN: <0.1 KU/L
CAT DANDER IGE QN: 0.43 KU/L
CMN PIGWEED IGE QN: <0.1 KU/L
COMMON RAGWEED IGE QN: <0.1 KU/L
CONV CLASS DESCRIPTION: ABNORMAL
D FARINAE IGE QN: 0.23 KU/L
D PTERONYSS IGE QN: 0.46 KU/L
DOG DANDER IGE QN: <0.1 KU/L
ENGL PLANTAIN IGE QN: <0.1 KU/L
HAZELNUT POLN IGE QN: <0.1 KU/L
JOHNSON GRASS IGE QN: <0.1 KU/L
KENT BLUE GRASS IGE QN: <0.1 KU/L
LONDON PLANE IGE QN: <0.1 KU/L
M RACEMOSUS IGE QN: <0.1 KU/L
MT JUNIPER IGE QN: <0.1 KU/L
MUGWORT IGE QN: <0.1 KU/L
NETTLE IGE QN: <0.1 KU/L
P NOTATUM IGE QN: 0.11 KU/L
S BOTRYOSUM IGE QN: <0.1 KU/L
SHEEP SORREL IGE QN: <0.1 KU/L
SWEET GUM IGE QN: <0.1 KU/L
WHITE ELM IGE QN: <0.1 KU/L
WHITE HICKORY IGE QN: <0.1 KU/L
WHITE MULBERRY IGE QN: <0.1 KU/L
WHITE OAK IGE QN: <0.1 KU/L

## 2021-07-15 ENCOUNTER — HOSPITAL ENCOUNTER (OUTPATIENT)
Dept: NUTRITION | Facility: HOSPITAL | Age: 26
Setting detail: RECURRING SERIES
Discharge: HOME | End: 2021-07-15

## 2021-07-15 NOTE — PROGRESS NOTES
"Adult Outpatient Nutrition  Assessment/PES    Patient Name:  Yoli Regan  YOB: 1995  MRN: [id number]    Assessment Date:  7/15/2021    Telephone nutrition consult, 30 minutes. This medical referred consult was provided as a telephone call, as patient is unable to attend an in-office appointment due to the COVID-19 crisis. Consent for treatment was given verbally.    Comments:  Spoke with patient for nutrition follow up appointment related to weight loss. No specific weight reported by patient today, but feels she has lost some weight since our initial session last month. States her mother has noticed a slimming of her face. Patient has faced some barriers this month. She was hospitalized due to severe asthma and has started a new job at [employer], so her schedule is shifting. Patient is currently weaning down off of her large dose of steroids. States despite the negative side effects of the steroids, including increased hunger, she has been able to make positive nutrition changes. She describes success with adding in a breakfast and making healthier snack choices, which were her main 2 goals set last session. She is often eating a yogurt or fruit in the morning. Snacks have switched to nuts or sunflower seeds rather than chips. Her next goal is to try incorporating some protein shakes or smoothies in the morning or during her work shift, as it would be easy to carry around with her. RD reinforced the importance of a nutritious meal to start the day, making sure it includes carbohydrates, proteins, and fats. Discussed how this can improve satiety and decrease cravings later in the day. Last session we also discussed creating a balanced meal at dinner using the plate method as a way to increase vegetables. Patient states she is doing well with this and is \"eating a lot of vegetables\". She has purchased the steamable bags as a convenient option. Reports she has also decreased her servings of " "grains/starch foods. Overall states she has \"done much better\" with her nutrition. RD congratulated patient on her progress and encouraged her to continue. For the next month, patient would like to continue focusing on her current goals. Denies any questions or concerns today.    Goal completion:  1. Eat in the morning during the work week: 100%  2. Healthier snack choices: 100%  3. Gradual weight loss: 100% self reported     Total of 25 minutes spent counseling with patient. A continued nutrition appointment is scheduled for 8/18 at 2:45 p.m. Patient is encouraged to contact RD as needed. Thank you again for this referral.     General Info     Row Name 07/15/21 1556       Today's Session    Person(s) attending today's session  Patient     Services Used Today?  No       General Information    How Well Do You Speak English?  very well         Preferred Language  English    Are you able to read and write English?  Yes    Lives With      Is patient pregnant?  no        Nutritional Info/Activity     Row Name 07/15/21 1550       Nutritional Information    Have you had weight changes?  Yes    Describe weight changes  Weight loss       Eating Environment    Eating environment  Family;Work        Electronically signed by:  Desirae Mares RD  07/15/21 15:56 EDT  "

## 2021-07-22 PROBLEM — Z01.419 WELL WOMAN EXAM: Status: ACTIVE | Noted: 2021-07-22

## 2021-08-18 ENCOUNTER — APPOINTMENT (OUTPATIENT)
Dept: NUTRITION | Facility: HOSPITAL | Age: 26
End: 2021-08-18

## 2021-08-23 ENCOUNTER — TELEPHONE (OUTPATIENT)
Dept: PULMONOLOGY | Facility: CLINIC | Age: 26
End: 2021-08-23

## 2021-08-23 NOTE — TELEPHONE ENCOUNTER
Patient called in stating she is been short of breath more than usual, she state that wants a round of steroid because that help her a lot, patient state she is been using her inhaler and neb every day, she would like a called back regarding her situation with the asthma and if we can sent a Rx to the pharmacy

## 2022-04-21 ENCOUNTER — APPOINTMENT (OUTPATIENT)
Dept: GENERAL RADIOLOGY | Facility: HOSPITAL | Age: 27
End: 2022-04-21

## 2022-04-21 ENCOUNTER — HOSPITAL ENCOUNTER (INPATIENT)
Facility: HOSPITAL | Age: 27
LOS: 4 days | Discharge: HOME | End: 2022-04-25
Attending: EMERGENCY MEDICINE | Admitting: INTERNAL MEDICINE

## 2022-04-21 DIAGNOSIS — J45.50 SEVERE PERSISTENT ASTHMA WITHOUT COMPLICATION: ICD-10-CM

## 2022-04-21 DIAGNOSIS — B34.8 RHINOVIRUS INFECTION: ICD-10-CM

## 2022-04-21 DIAGNOSIS — J45.41 MODERATE PERSISTENT ASTHMA WITH EXACERBATION: Primary | ICD-10-CM

## 2022-04-21 DIAGNOSIS — J96.01 ACUTE RESPIRATORY FAILURE WITH HYPOXIA: ICD-10-CM

## 2022-04-21 LAB
ALBUMIN SERPL-MCNC: 4.6 G/DL (ref 3.5–5.2)
ALBUMIN/GLOB SERPL: 1.8 G/DL
ALP SERPL-CCNC: 61 U/L (ref 39–117)
ALT SERPL W P-5'-P-CCNC: 24 U/L (ref 1–33)
ANION GAP SERPL CALCULATED.3IONS-SCNC: 12 MMOL/L (ref 5–15)
AST SERPL-CCNC: 23 U/L (ref 1–32)
B PARAPERT DNA SPEC QL NAA+PROBE: NOT DETECTED
B PERT DNA SPEC QL NAA+PROBE: NOT DETECTED
B-HCG UR QL: NEGATIVE
BASOPHILS # BLD AUTO: 0.04 10*3/MM3 (ref 0–0.2)
BASOPHILS NFR BLD AUTO: 0.4 % (ref 0–1.5)
BILIRUB SERPL-MCNC: 0.4 MG/DL (ref 0–1.2)
BUN SERPL-MCNC: 8 MG/DL (ref 6–20)
BUN/CREAT SERPL: 13.1 (ref 7–25)
C PNEUM DNA NPH QL NAA+NON-PROBE: NOT DETECTED
CALCIUM SPEC-SCNC: 9.5 MG/DL (ref 8.6–10.5)
CHLORIDE SERPL-SCNC: 107 MMOL/L (ref 98–107)
CO2 SERPL-SCNC: 20 MMOL/L (ref 22–29)
CREAT SERPL-MCNC: 0.61 MG/DL (ref 0.57–1)
D DIMER PPP FEU-MCNC: 0.35 MCGFEU/ML (ref 0.01–0.5)
DEPRECATED RDW RBC AUTO: 38.8 FL (ref 37–54)
EGFRCR SERPLBLD CKD-EPI 2021: 127.4 ML/MIN/1.73
EOSINOPHIL # BLD AUTO: 0.62 10*3/MM3 (ref 0–0.4)
EOSINOPHIL NFR BLD AUTO: 5.9 % (ref 0.3–6.2)
ERYTHROCYTE [DISTWIDTH] IN BLOOD BY AUTOMATED COUNT: 14.6 % (ref 12.3–15.4)
EXPIRATION DATE: 2023
FLUAV SUBTYP SPEC NAA+PROBE: NOT DETECTED
FLUBV RNA ISLT QL NAA+PROBE: NOT DETECTED
GLOBULIN UR ELPH-MCNC: 2.6 GM/DL
GLUCOSE SERPL-MCNC: 156 MG/DL (ref 65–99)
HADV DNA SPEC NAA+PROBE: NOT DETECTED
HCOV 229E RNA SPEC QL NAA+PROBE: NOT DETECTED
HCOV HKU1 RNA SPEC QL NAA+PROBE: NOT DETECTED
HCOV NL63 RNA SPEC QL NAA+PROBE: NOT DETECTED
HCOV OC43 RNA SPEC QL NAA+PROBE: NOT DETECTED
HCT VFR BLD AUTO: 45.7 % (ref 34–46.6)
HGB BLD-MCNC: 14 G/DL (ref 12–15.9)
HMPV RNA NPH QL NAA+NON-PROBE: NOT DETECTED
HOLD SPECIMEN: NORMAL
HPIV1 RNA ISLT QL NAA+PROBE: NOT DETECTED
HPIV2 RNA SPEC QL NAA+PROBE: NOT DETECTED
HPIV3 RNA NPH QL NAA+PROBE: NOT DETECTED
HPIV4 P GENE NPH QL NAA+PROBE: NOT DETECTED
HYPOCHROMIA BLD QL: NORMAL
IMM GRANULOCYTES # BLD AUTO: 0.05 10*3/MM3 (ref 0–0.05)
IMM GRANULOCYTES NFR BLD AUTO: 0.5 % (ref 0–0.5)
INTERNAL NEGATIVE CONTROL: NORMAL
INTERNAL POSITIVE CONTROL: NORMAL
LYMPHOCYTES # BLD AUTO: 0.73 10*3/MM3 (ref 0.7–3.1)
LYMPHOCYTES NFR BLD AUTO: 7 % (ref 19.6–45.3)
Lab: 1024
M PNEUMO IGG SER IA-ACNC: NOT DETECTED
MAGNESIUM SERPL-MCNC: 1.7 MG/DL (ref 1.6–2.6)
MCH RBC QN AUTO: 23.1 PG (ref 26.6–33)
MCHC RBC AUTO-ENTMCNC: 30.6 G/DL (ref 31.5–35.7)
MCV RBC AUTO: 75.4 FL (ref 79–97)
MICROCYTES BLD QL: NORMAL
MONOCYTES # BLD AUTO: 0.26 10*3/MM3 (ref 0.1–0.9)
MONOCYTES NFR BLD AUTO: 2.5 % (ref 5–12)
NEUTROPHILS NFR BLD AUTO: 8.75 10*3/MM3 (ref 1.7–7)
NEUTROPHILS NFR BLD AUTO: 83.7 % (ref 42.7–76)
NRBC BLD AUTO-RTO: 0 /100 WBC (ref 0–0.2)
NT-PROBNP SERPL-MCNC: 151.8 PG/ML (ref 0–450)
PLAT MORPH BLD: NORMAL
PLATELET # BLD AUTO: 399 10*3/MM3 (ref 140–450)
PMV BLD AUTO: 11.3 FL (ref 6–12)
POTASSIUM SERPL-SCNC: 4 MMOL/L (ref 3.5–5.2)
PROT SERPL-MCNC: 7.2 G/DL (ref 6–8.5)
QT INTERVAL: 312 MS
QTC INTERVAL: 448 MS
RBC # BLD AUTO: 6.06 10*6/MM3 (ref 3.77–5.28)
RHINOVIRUS RNA SPEC NAA+PROBE: DETECTED
RSV RNA NPH QL NAA+NON-PROBE: NOT DETECTED
SARS-COV-2 RNA NPH QL NAA+NON-PROBE: NOT DETECTED
SODIUM SERPL-SCNC: 139 MMOL/L (ref 136–145)
TROPONIN T SERPL-MCNC: <0.01 NG/ML (ref 0–0.03)
WBC MORPH BLD: NORMAL
WBC NRBC COR # BLD: 10.45 10*3/MM3 (ref 3.4–10.8)
WHOLE BLOOD HOLD SPECIMEN: NORMAL
WHOLE BLOOD HOLD SPECIMEN: NORMAL

## 2022-04-21 PROCEDURE — 93005 ELECTROCARDIOGRAM TRACING: CPT | Performed by: EMERGENCY MEDICINE

## 2022-04-21 PROCEDURE — 94640 AIRWAY INHALATION TREATMENT: CPT

## 2022-04-21 PROCEDURE — 94799 UNLISTED PULMONARY SVC/PX: CPT

## 2022-04-21 PROCEDURE — 25010000002 ENOXAPARIN PER 10 MG: Performed by: INTERNAL MEDICINE

## 2022-04-21 PROCEDURE — 0202U NFCT DS 22 TRGT SARS-COV-2: CPT | Performed by: PHYSICIAN ASSISTANT

## 2022-04-21 PROCEDURE — 84484 ASSAY OF TROPONIN QUANT: CPT | Performed by: EMERGENCY MEDICINE

## 2022-04-21 PROCEDURE — 99222 1ST HOSP IP/OBS MODERATE 55: CPT | Performed by: INTERNAL MEDICINE

## 2022-04-21 PROCEDURE — 71045 X-RAY EXAM CHEST 1 VIEW: CPT

## 2022-04-21 PROCEDURE — 25010000002 METHYLPREDNISOLONE PER 125 MG: Performed by: PHYSICIAN ASSISTANT

## 2022-04-21 PROCEDURE — 25010000002 MAGNESIUM SULFATE IN D5W 1G/100ML (PREMIX) 1-5 GM/100ML-% SOLUTION: Performed by: PHYSICIAN ASSISTANT

## 2022-04-21 PROCEDURE — 80053 COMPREHEN METABOLIC PANEL: CPT | Performed by: EMERGENCY MEDICINE

## 2022-04-21 PROCEDURE — 85007 BL SMEAR W/DIFF WBC COUNT: CPT | Performed by: EMERGENCY MEDICINE

## 2022-04-21 PROCEDURE — 25010000002 METHYLPREDNISOLONE PER 125 MG: Performed by: INTERNAL MEDICINE

## 2022-04-21 PROCEDURE — 99284 EMERGENCY DEPT VISIT MOD MDM: CPT

## 2022-04-21 PROCEDURE — 83880 ASSAY OF NATRIURETIC PEPTIDE: CPT | Performed by: EMERGENCY MEDICINE

## 2022-04-21 PROCEDURE — 83735 ASSAY OF MAGNESIUM: CPT | Performed by: PHYSICIAN ASSISTANT

## 2022-04-21 PROCEDURE — 85379 FIBRIN DEGRADATION QUANT: CPT | Performed by: INTERNAL MEDICINE

## 2022-04-21 PROCEDURE — 85025 COMPLETE CBC W/AUTO DIFF WBC: CPT | Performed by: EMERGENCY MEDICINE

## 2022-04-21 PROCEDURE — 81025 URINE PREGNANCY TEST: CPT | Performed by: EMERGENCY MEDICINE

## 2022-04-21 RX ORDER — SODIUM CHLORIDE 0.9 % (FLUSH) 0.9 %
10 SYRINGE (ML) INJECTION AS NEEDED
Status: DISCONTINUED | OUTPATIENT
Start: 2022-04-21 | End: 2022-04-25 | Stop reason: HOSPADM

## 2022-04-21 RX ORDER — ACETAMINOPHEN 325 MG/1
650 TABLET ORAL EVERY 4 HOURS PRN
Status: DISCONTINUED | OUTPATIENT
Start: 2022-04-21 | End: 2022-04-25 | Stop reason: HOSPADM

## 2022-04-21 RX ORDER — MAGNESIUM SULFATE 1 G/100ML
1 INJECTION INTRAVENOUS ONCE
Status: COMPLETED | OUTPATIENT
Start: 2022-04-21 | End: 2022-04-21

## 2022-04-21 RX ORDER — ONDANSETRON 4 MG/1
4 TABLET, FILM COATED ORAL EVERY 6 HOURS PRN
Status: DISCONTINUED | OUTPATIENT
Start: 2022-04-21 | End: 2022-04-25 | Stop reason: HOSPADM

## 2022-04-21 RX ORDER — ONDANSETRON 2 MG/ML
4 INJECTION INTRAMUSCULAR; INTRAVENOUS EVERY 6 HOURS PRN
Status: DISCONTINUED | OUTPATIENT
Start: 2022-04-21 | End: 2022-04-25 | Stop reason: HOSPADM

## 2022-04-21 RX ORDER — METHYLPREDNISOLONE SODIUM SUCCINATE 125 MG/2ML
60 INJECTION, POWDER, LYOPHILIZED, FOR SOLUTION INTRAMUSCULAR; INTRAVENOUS EVERY 8 HOURS
Status: DISCONTINUED | OUTPATIENT
Start: 2022-04-21 | End: 2022-04-22

## 2022-04-21 RX ORDER — BUDESONIDE AND FORMOTEROL FUMARATE DIHYDRATE 160; 4.5 UG/1; UG/1
2 AEROSOL RESPIRATORY (INHALATION)
Status: DISCONTINUED | OUTPATIENT
Start: 2022-04-21 | End: 2022-04-25 | Stop reason: HOSPADM

## 2022-04-21 RX ORDER — MONTELUKAST SODIUM 10 MG/1
10 TABLET ORAL NIGHTLY
Status: DISCONTINUED | OUTPATIENT
Start: 2022-04-21 | End: 2022-04-25 | Stop reason: HOSPADM

## 2022-04-21 RX ORDER — SODIUM CHLORIDE 0.9 % (FLUSH) 0.9 %
10 SYRINGE (ML) INJECTION EVERY 12 HOURS SCHEDULED
Status: DISCONTINUED | OUTPATIENT
Start: 2022-04-21 | End: 2022-04-25 | Stop reason: HOSPADM

## 2022-04-21 RX ORDER — IPRATROPIUM BROMIDE AND ALBUTEROL SULFATE 2.5; .5 MG/3ML; MG/3ML
3 SOLUTION RESPIRATORY (INHALATION)
Status: DISCONTINUED | OUTPATIENT
Start: 2022-04-21 | End: 2022-04-22 | Stop reason: SDUPTHER

## 2022-04-21 RX ORDER — METHYLPREDNISOLONE SODIUM SUCCINATE 125 MG/2ML
125 INJECTION, POWDER, LYOPHILIZED, FOR SOLUTION INTRAMUSCULAR; INTRAVENOUS ONCE
Status: COMPLETED | OUTPATIENT
Start: 2022-04-21 | End: 2022-04-21

## 2022-04-21 RX ORDER — ALBUTEROL SULFATE 2.5 MG/3ML
10 SOLUTION RESPIRATORY (INHALATION)
Status: COMPLETED | OUTPATIENT
Start: 2022-04-21 | End: 2022-04-21

## 2022-04-21 RX ORDER — ALBUTEROL SULFATE 2.5 MG/3ML
2.5 SOLUTION RESPIRATORY (INHALATION) EVERY 6 HOURS PRN
Status: DISCONTINUED | OUTPATIENT
Start: 2022-04-21 | End: 2022-04-25 | Stop reason: HOSPADM

## 2022-04-21 RX ORDER — IPRATROPIUM BROMIDE AND ALBUTEROL SULFATE 2.5; .5 MG/3ML; MG/3ML
3 SOLUTION RESPIRATORY (INHALATION)
Status: DISCONTINUED | OUTPATIENT
Start: 2022-04-21 | End: 2022-04-22

## 2022-04-21 RX ORDER — METHYLPREDNISOLONE SODIUM SUCCINATE 125 MG/2ML
60 INJECTION, POWDER, LYOPHILIZED, FOR SOLUTION INTRAMUSCULAR; INTRAVENOUS ONCE
Status: DISCONTINUED | OUTPATIENT
Start: 2022-04-21 | End: 2022-04-21 | Stop reason: SDUPTHER

## 2022-04-21 RX ORDER — PANTOPRAZOLE SODIUM 40 MG/1
40 TABLET, DELAYED RELEASE ORAL EVERY MORNING
Status: DISCONTINUED | OUTPATIENT
Start: 2022-04-22 | End: 2022-04-25 | Stop reason: HOSPADM

## 2022-04-21 RX ADMIN — METHYLPREDNISOLONE SODIUM SUCCINATE 60 MG: 125 INJECTION, POWDER, FOR SOLUTION INTRAMUSCULAR; INTRAVENOUS at 15:09

## 2022-04-21 RX ADMIN — ENOXAPARIN SODIUM 40 MG: 40 INJECTION SUBCUTANEOUS at 15:10

## 2022-04-21 RX ADMIN — IPRATROPIUM BROMIDE AND ALBUTEROL SULFATE 3 ML: .5; 2.5 SOLUTION RESPIRATORY (INHALATION) at 19:31

## 2022-04-21 RX ADMIN — METHYLPREDNISOLONE SODIUM SUCCINATE 60 MG: 125 INJECTION, POWDER, FOR SOLUTION INTRAMUSCULAR; INTRAVENOUS at 21:33

## 2022-04-21 RX ADMIN — BUDESONIDE AND FORMOTEROL FUMARATE DIHYDRATE 2 PUFF: 160; 4.5 AEROSOL RESPIRATORY (INHALATION) at 19:31

## 2022-04-21 RX ADMIN — IPRATROPIUM BROMIDE AND ALBUTEROL SULFATE 3 ML: .5; 2.5 SOLUTION RESPIRATORY (INHALATION) at 16:46

## 2022-04-21 RX ADMIN — ACETAMINOPHEN 650 MG: 325 TABLET ORAL at 18:52

## 2022-04-21 RX ADMIN — ALBUTEROL SULFATE 10 MG: 2.5 SOLUTION RESPIRATORY (INHALATION) at 08:34

## 2022-04-21 RX ADMIN — METHYLPREDNISOLONE SODIUM SUCCINATE 125 MG: 125 INJECTION, POWDER, FOR SOLUTION INTRAMUSCULAR; INTRAVENOUS at 09:17

## 2022-04-21 RX ADMIN — IPRATROPIUM BROMIDE AND ALBUTEROL SULFATE 3 ML: .5; 2.5 SOLUTION RESPIRATORY (INHALATION) at 11:49

## 2022-04-21 RX ADMIN — Medication 10 ML: at 21:35

## 2022-04-21 RX ADMIN — MONTELUKAST 10 MG: 10 TABLET, FILM COATED ORAL at 21:33

## 2022-04-21 RX ADMIN — MAGNESIUM SULFATE HEPTAHYDRATE 1 G: 1 INJECTION, SOLUTION INTRAVENOUS at 10:44

## 2022-04-22 ENCOUNTER — APPOINTMENT (OUTPATIENT)
Dept: CT IMAGING | Facility: HOSPITAL | Age: 27
End: 2022-04-22

## 2022-04-22 LAB
ANION GAP SERPL CALCULATED.3IONS-SCNC: 10 MMOL/L (ref 5–15)
BUN SERPL-MCNC: 10 MG/DL (ref 6–20)
BUN/CREAT SERPL: 16.1 (ref 7–25)
CALCIUM SPEC-SCNC: 9 MG/DL (ref 8.6–10.5)
CHLORIDE SERPL-SCNC: 107 MMOL/L (ref 98–107)
CO2 SERPL-SCNC: 23 MMOL/L (ref 22–29)
CREAT SERPL-MCNC: 0.62 MG/DL (ref 0.57–1)
DEPRECATED RDW RBC AUTO: 38.8 FL (ref 37–54)
EGFRCR SERPLBLD CKD-EPI 2021: 126.9 ML/MIN/1.73
ERYTHROCYTE [DISTWIDTH] IN BLOOD BY AUTOMATED COUNT: 15.1 % (ref 12.3–15.4)
GLUCOSE SERPL-MCNC: 154 MG/DL (ref 65–99)
HCT VFR BLD AUTO: 42.9 % (ref 34–46.6)
HGB BLD-MCNC: 13.2 G/DL (ref 12–15.9)
MAGNESIUM SERPL-MCNC: 2.2 MG/DL (ref 1.6–2.6)
MCH RBC QN AUTO: 22.7 PG (ref 26.6–33)
MCHC RBC AUTO-ENTMCNC: 30.8 G/DL (ref 31.5–35.7)
MCV RBC AUTO: 73.7 FL (ref 79–97)
PLATELET # BLD AUTO: 398 10*3/MM3 (ref 140–450)
PMV BLD AUTO: 11.6 FL (ref 6–12)
POTASSIUM SERPL-SCNC: 4.3 MMOL/L (ref 3.5–5.2)
PROCALCITONIN SERPL-MCNC: 0.04 NG/ML (ref 0–0.25)
RBC # BLD AUTO: 5.82 10*6/MM3 (ref 3.77–5.28)
SODIUM SERPL-SCNC: 140 MMOL/L (ref 136–145)
WBC NRBC COR # BLD: 16.19 10*3/MM3 (ref 3.4–10.8)

## 2022-04-22 PROCEDURE — 0 IOPAMIDOL PER 1 ML: Performed by: INTERNAL MEDICINE

## 2022-04-22 PROCEDURE — 83735 ASSAY OF MAGNESIUM: CPT | Performed by: INTERNAL MEDICINE

## 2022-04-22 PROCEDURE — 94799 UNLISTED PULMONARY SVC/PX: CPT

## 2022-04-22 PROCEDURE — 94761 N-INVAS EAR/PLS OXIMETRY MLT: CPT

## 2022-04-22 PROCEDURE — 25010000002 METHYLPREDNISOLONE PER 125 MG: Performed by: INTERNAL MEDICINE

## 2022-04-22 PROCEDURE — 84145 PROCALCITONIN (PCT): CPT | Performed by: INTERNAL MEDICINE

## 2022-04-22 PROCEDURE — 85027 COMPLETE CBC AUTOMATED: CPT | Performed by: INTERNAL MEDICINE

## 2022-04-22 PROCEDURE — 99233 SBSQ HOSP IP/OBS HIGH 50: CPT | Performed by: INTERNAL MEDICINE

## 2022-04-22 PROCEDURE — 25010000002 ENOXAPARIN PER 10 MG: Performed by: INTERNAL MEDICINE

## 2022-04-22 PROCEDURE — 80048 BASIC METABOLIC PNL TOTAL CA: CPT | Performed by: INTERNAL MEDICINE

## 2022-04-22 PROCEDURE — 71275 CT ANGIOGRAPHY CHEST: CPT

## 2022-04-22 PROCEDURE — 83036 HEMOGLOBIN GLYCOSYLATED A1C: CPT | Performed by: INTERNAL MEDICINE

## 2022-04-22 RX ORDER — METHYLPREDNISOLONE SODIUM SUCCINATE 125 MG/2ML
60 INJECTION, POWDER, LYOPHILIZED, FOR SOLUTION INTRAMUSCULAR; INTRAVENOUS EVERY 12 HOURS
Status: DISCONTINUED | OUTPATIENT
Start: 2022-04-22 | End: 2022-04-23

## 2022-04-22 RX ORDER — MAGNESIUM SULFATE HEPTAHYDRATE 40 MG/ML
4 INJECTION, SOLUTION INTRAVENOUS AS NEEDED
Status: DISCONTINUED | OUTPATIENT
Start: 2022-04-22 | End: 2022-04-25 | Stop reason: HOSPADM

## 2022-04-22 RX ORDER — MAGNESIUM SULFATE HEPTAHYDRATE 40 MG/ML
2 INJECTION, SOLUTION INTRAVENOUS AS NEEDED
Status: DISCONTINUED | OUTPATIENT
Start: 2022-04-22 | End: 2022-04-25 | Stop reason: HOSPADM

## 2022-04-22 RX ORDER — IPRATROPIUM BROMIDE AND ALBUTEROL SULFATE 2.5; .5 MG/3ML; MG/3ML
3 SOLUTION RESPIRATORY (INHALATION)
Status: DISCONTINUED | OUTPATIENT
Start: 2022-04-22 | End: 2022-04-25 | Stop reason: HOSPADM

## 2022-04-22 RX ORDER — DOXYCYCLINE 100 MG/1
100 CAPSULE ORAL EVERY 12 HOURS SCHEDULED
Status: DISCONTINUED | OUTPATIENT
Start: 2022-04-22 | End: 2022-04-25 | Stop reason: HOSPADM

## 2022-04-22 RX ORDER — MAGNESIUM SULFATE 1 G/100ML
1 INJECTION INTRAVENOUS AS NEEDED
Status: DISCONTINUED | OUTPATIENT
Start: 2022-04-22 | End: 2022-04-25 | Stop reason: HOSPADM

## 2022-04-22 RX ADMIN — PANTOPRAZOLE SODIUM 40 MG: 40 TABLET, DELAYED RELEASE ORAL at 08:19

## 2022-04-22 RX ADMIN — METHYLPREDNISOLONE SODIUM SUCCINATE 60 MG: 125 INJECTION, POWDER, FOR SOLUTION INTRAMUSCULAR; INTRAVENOUS at 05:44

## 2022-04-22 RX ADMIN — Medication 10 ML: at 21:17

## 2022-04-22 RX ADMIN — ALBUTEROL SULFATE 2.5 MG: 2.5 SOLUTION RESPIRATORY (INHALATION) at 03:50

## 2022-04-22 RX ADMIN — IOPAMIDOL 85 ML: 755 INJECTION, SOLUTION INTRAVENOUS at 08:39

## 2022-04-22 RX ADMIN — IPRATROPIUM BROMIDE AND ALBUTEROL SULFATE 3 ML: .5; 3 SOLUTION RESPIRATORY (INHALATION) at 23:40

## 2022-04-22 RX ADMIN — DOXYCYCLINE 100 MG: 100 CAPSULE ORAL at 20:33

## 2022-04-22 RX ADMIN — IPRATROPIUM BROMIDE AND ALBUTEROL SULFATE 3 ML: .5; 3 SOLUTION RESPIRATORY (INHALATION) at 08:08

## 2022-04-22 RX ADMIN — IPRATROPIUM BROMIDE AND ALBUTEROL SULFATE 3 ML: .5; 3 SOLUTION RESPIRATORY (INHALATION) at 20:03

## 2022-04-22 RX ADMIN — MONTELUKAST 10 MG: 10 TABLET, FILM COATED ORAL at 20:33

## 2022-04-22 RX ADMIN — IPRATROPIUM BROMIDE AND ALBUTEROL SULFATE 3 ML: .5; 3 SOLUTION RESPIRATORY (INHALATION) at 16:10

## 2022-04-22 RX ADMIN — BUDESONIDE AND FORMOTEROL FUMARATE DIHYDRATE 2 PUFF: 160; 4.5 AEROSOL RESPIRATORY (INHALATION) at 20:03

## 2022-04-22 RX ADMIN — ENOXAPARIN SODIUM 40 MG: 40 INJECTION SUBCUTANEOUS at 17:52

## 2022-04-22 RX ADMIN — Medication 10 ML: at 08:22

## 2022-04-22 RX ADMIN — METHYLPREDNISOLONE SODIUM SUCCINATE 60 MG: 125 INJECTION, POWDER, FOR SOLUTION INTRAMUSCULAR; INTRAVENOUS at 17:53

## 2022-04-22 RX ADMIN — BUDESONIDE AND FORMOTEROL FUMARATE DIHYDRATE 2 PUFF: 160; 4.5 AEROSOL RESPIRATORY (INHALATION) at 08:09

## 2022-04-22 RX ADMIN — IPRATROPIUM BROMIDE AND ALBUTEROL SULFATE 3 ML: .5; 3 SOLUTION RESPIRATORY (INHALATION) at 12:18

## 2022-04-23 LAB
ANION GAP SERPL CALCULATED.3IONS-SCNC: 11 MMOL/L (ref 5–15)
BUN SERPL-MCNC: 12 MG/DL (ref 6–20)
BUN/CREAT SERPL: 18.8 (ref 7–25)
CALCIUM SPEC-SCNC: 8.7 MG/DL (ref 8.6–10.5)
CHLORIDE SERPL-SCNC: 107 MMOL/L (ref 98–107)
CO2 SERPL-SCNC: 22 MMOL/L (ref 22–29)
CREAT SERPL-MCNC: 0.64 MG/DL (ref 0.57–1)
EGFRCR SERPLBLD CKD-EPI 2021: 126 ML/MIN/1.73
GLUCOSE BLDC GLUCOMTR-MCNC: 158 MG/DL (ref 70–130)
GLUCOSE BLDC GLUCOMTR-MCNC: 225 MG/DL (ref 70–130)
GLUCOSE SERPL-MCNC: 226 MG/DL (ref 65–99)
HBA1C MFR BLD: 5.7 % (ref 4.8–5.6)
MAGNESIUM SERPL-MCNC: 2 MG/DL (ref 1.6–2.6)
POTASSIUM SERPL-SCNC: 4.5 MMOL/L (ref 3.5–5.2)
SODIUM SERPL-SCNC: 140 MMOL/L (ref 136–145)

## 2022-04-23 PROCEDURE — 25010000002 METHYLPREDNISOLONE PER 125 MG: Performed by: INTERNAL MEDICINE

## 2022-04-23 PROCEDURE — 82962 GLUCOSE BLOOD TEST: CPT

## 2022-04-23 PROCEDURE — 63710000001 INSULIN LISPRO (HUMAN) PER 5 UNITS: Performed by: INTERNAL MEDICINE

## 2022-04-23 PROCEDURE — 83735 ASSAY OF MAGNESIUM: CPT | Performed by: INTERNAL MEDICINE

## 2022-04-23 PROCEDURE — 94761 N-INVAS EAR/PLS OXIMETRY MLT: CPT

## 2022-04-23 PROCEDURE — 94799 UNLISTED PULMONARY SVC/PX: CPT

## 2022-04-23 PROCEDURE — 80048 BASIC METABOLIC PNL TOTAL CA: CPT | Performed by: INTERNAL MEDICINE

## 2022-04-23 PROCEDURE — 99232 SBSQ HOSP IP/OBS MODERATE 35: CPT | Performed by: INTERNAL MEDICINE

## 2022-04-23 RX ORDER — GUAIFENESIN/DEXTROMETHORPHAN 100-10MG/5
5 SYRUP ORAL EVERY 4 HOURS PRN
Status: DISCONTINUED | OUTPATIENT
Start: 2022-04-23 | End: 2022-04-25 | Stop reason: HOSPADM

## 2022-04-23 RX ORDER — DEXTROSE MONOHYDRATE 25 G/50ML
25 INJECTION, SOLUTION INTRAVENOUS
Status: DISCONTINUED | OUTPATIENT
Start: 2022-04-23 | End: 2022-04-25 | Stop reason: HOSPADM

## 2022-04-23 RX ORDER — METHYLPREDNISOLONE SODIUM SUCCINATE 40 MG/ML
40 INJECTION, POWDER, LYOPHILIZED, FOR SOLUTION INTRAMUSCULAR; INTRAVENOUS EVERY 12 HOURS
Status: DISPENSED | OUTPATIENT
Start: 2022-04-23 | End: 2022-04-25

## 2022-04-23 RX ORDER — NICOTINE POLACRILEX 4 MG
15 LOZENGE BUCCAL
Status: DISCONTINUED | OUTPATIENT
Start: 2022-04-23 | End: 2022-04-25 | Stop reason: HOSPADM

## 2022-04-23 RX ADMIN — ACETAMINOPHEN 650 MG: 325 TABLET ORAL at 16:22

## 2022-04-23 RX ADMIN — GUAIFENESIN AND DEXTROMETHORPHAN 5 ML: 100; 10 SYRUP ORAL at 15:28

## 2022-04-23 RX ADMIN — BUDESONIDE AND FORMOTEROL FUMARATE DIHYDRATE 2 PUFF: 160; 4.5 AEROSOL RESPIRATORY (INHALATION) at 07:02

## 2022-04-23 RX ADMIN — IPRATROPIUM BROMIDE AND ALBUTEROL SULFATE 3 ML: .5; 3 SOLUTION RESPIRATORY (INHALATION) at 23:37

## 2022-04-23 RX ADMIN — PANTOPRAZOLE SODIUM 40 MG: 40 TABLET, DELAYED RELEASE ORAL at 09:50

## 2022-04-23 RX ADMIN — MONTELUKAST 10 MG: 10 TABLET, FILM COATED ORAL at 21:01

## 2022-04-23 RX ADMIN — IPRATROPIUM BROMIDE AND ALBUTEROL SULFATE 3 ML: .5; 3 SOLUTION RESPIRATORY (INHALATION) at 11:02

## 2022-04-23 RX ADMIN — METHYLPREDNISOLONE SODIUM SUCCINATE 60 MG: 125 INJECTION, POWDER, FOR SOLUTION INTRAMUSCULAR; INTRAVENOUS at 06:04

## 2022-04-23 RX ADMIN — BUDESONIDE AND FORMOTEROL FUMARATE DIHYDRATE 2 PUFF: 160; 4.5 AEROSOL RESPIRATORY (INHALATION) at 18:41

## 2022-04-23 RX ADMIN — DOXYCYCLINE 100 MG: 100 CAPSULE ORAL at 21:01

## 2022-04-23 RX ADMIN — METHYLPREDNISOLONE SODIUM SUCCINATE 60 MG: 125 INJECTION, POWDER, FOR SOLUTION INTRAMUSCULAR; INTRAVENOUS at 16:22

## 2022-04-23 RX ADMIN — IPRATROPIUM BROMIDE AND ALBUTEROL SULFATE 3 ML: .5; 3 SOLUTION RESPIRATORY (INHALATION) at 07:03

## 2022-04-23 RX ADMIN — IPRATROPIUM BROMIDE AND ALBUTEROL SULFATE 3 ML: .5; 3 SOLUTION RESPIRATORY (INHALATION) at 15:35

## 2022-04-23 RX ADMIN — DOXYCYCLINE 100 MG: 100 CAPSULE ORAL at 09:51

## 2022-04-23 RX ADMIN — Medication 10 ML: at 21:02

## 2022-04-23 RX ADMIN — IPRATROPIUM BROMIDE AND ALBUTEROL SULFATE 3 ML: .5; 3 SOLUTION RESPIRATORY (INHALATION) at 03:03

## 2022-04-23 RX ADMIN — ACETAMINOPHEN 650 MG: 325 TABLET ORAL at 09:56

## 2022-04-23 RX ADMIN — IPRATROPIUM BROMIDE AND ALBUTEROL SULFATE 3 ML: .5; 3 SOLUTION RESPIRATORY (INHALATION) at 18:41

## 2022-04-23 RX ADMIN — INSULIN LISPRO 3 UNITS: 100 INJECTION, SOLUTION INTRAVENOUS; SUBCUTANEOUS at 18:04

## 2022-04-24 LAB
ANION GAP SERPL CALCULATED.3IONS-SCNC: 10 MMOL/L (ref 5–15)
BUN SERPL-MCNC: 15 MG/DL (ref 6–20)
BUN/CREAT SERPL: 23.1 (ref 7–25)
CALCIUM SPEC-SCNC: 8.8 MG/DL (ref 8.6–10.5)
CHLORIDE SERPL-SCNC: 103 MMOL/L (ref 98–107)
CO2 SERPL-SCNC: 24 MMOL/L (ref 22–29)
CREAT SERPL-MCNC: 0.65 MG/DL (ref 0.57–1)
EGFRCR SERPLBLD CKD-EPI 2021: 124.7 ML/MIN/1.73
GLUCOSE BLDC GLUCOMTR-MCNC: 150 MG/DL (ref 70–130)
GLUCOSE BLDC GLUCOMTR-MCNC: 150 MG/DL (ref 70–130)
GLUCOSE BLDC GLUCOMTR-MCNC: 99 MG/DL (ref 70–130)
GLUCOSE SERPL-MCNC: 99 MG/DL (ref 65–99)
MAGNESIUM SERPL-MCNC: 2 MG/DL (ref 1.6–2.6)
POTASSIUM SERPL-SCNC: 4.2 MMOL/L (ref 3.5–5.2)
SODIUM SERPL-SCNC: 137 MMOL/L (ref 136–145)

## 2022-04-24 PROCEDURE — 25010000002 METHYLPREDNISOLONE PER 40 MG: Performed by: INTERNAL MEDICINE

## 2022-04-24 PROCEDURE — 82962 GLUCOSE BLOOD TEST: CPT

## 2022-04-24 PROCEDURE — 83735 ASSAY OF MAGNESIUM: CPT | Performed by: INTERNAL MEDICINE

## 2022-04-24 PROCEDURE — 94761 N-INVAS EAR/PLS OXIMETRY MLT: CPT

## 2022-04-24 PROCEDURE — 94799 UNLISTED PULMONARY SVC/PX: CPT

## 2022-04-24 PROCEDURE — 99232 SBSQ HOSP IP/OBS MODERATE 35: CPT | Performed by: INTERNAL MEDICINE

## 2022-04-24 PROCEDURE — 25010000002 ENOXAPARIN PER 10 MG: Performed by: INTERNAL MEDICINE

## 2022-04-24 PROCEDURE — 94664 DEMO&/EVAL PT USE INHALER: CPT

## 2022-04-24 PROCEDURE — 63710000001 INSULIN LISPRO (HUMAN) PER 5 UNITS: Performed by: INTERNAL MEDICINE

## 2022-04-24 PROCEDURE — 80048 BASIC METABOLIC PNL TOTAL CA: CPT | Performed by: INTERNAL MEDICINE

## 2022-04-24 RX ORDER — PREDNISONE 20 MG/1
60 TABLET ORAL
Status: DISCONTINUED | OUTPATIENT
Start: 2022-04-25 | End: 2022-04-25 | Stop reason: HOSPADM

## 2022-04-24 RX ORDER — IBUPROFEN 600 MG/1
600 TABLET ORAL ONCE
Status: COMPLETED | OUTPATIENT
Start: 2022-04-24 | End: 2022-04-24

## 2022-04-24 RX ORDER — TRAMADOL HYDROCHLORIDE 50 MG/1
50 TABLET ORAL EVERY 6 HOURS PRN
Status: DISCONTINUED | OUTPATIENT
Start: 2022-04-24 | End: 2022-04-25 | Stop reason: HOSPADM

## 2022-04-24 RX ADMIN — IPRATROPIUM BROMIDE AND ALBUTEROL SULFATE 3 ML: .5; 3 SOLUTION RESPIRATORY (INHALATION) at 03:46

## 2022-04-24 RX ADMIN — IPRATROPIUM BROMIDE AND ALBUTEROL SULFATE 3 ML: .5; 3 SOLUTION RESPIRATORY (INHALATION) at 07:22

## 2022-04-24 RX ADMIN — IPRATROPIUM BROMIDE AND ALBUTEROL SULFATE 3 ML: .5; 3 SOLUTION RESPIRATORY (INHALATION) at 16:02

## 2022-04-24 RX ADMIN — DOXYCYCLINE 100 MG: 100 CAPSULE ORAL at 21:19

## 2022-04-24 RX ADMIN — BUDESONIDE AND FORMOTEROL FUMARATE DIHYDRATE 2 PUFF: 160; 4.5 AEROSOL RESPIRATORY (INHALATION) at 18:25

## 2022-04-24 RX ADMIN — METHYLPREDNISOLONE SODIUM SUCCINATE 40 MG: 40 INJECTION, POWDER, FOR SOLUTION INTRAMUSCULAR; INTRAVENOUS at 17:06

## 2022-04-24 RX ADMIN — INSULIN LISPRO 2 UNITS: 100 INJECTION, SOLUTION INTRAVENOUS; SUBCUTANEOUS at 17:06

## 2022-04-24 RX ADMIN — GUAIFENESIN AND DEXTROMETHORPHAN 5 ML: 100; 10 SYRUP ORAL at 10:09

## 2022-04-24 RX ADMIN — PANTOPRAZOLE SODIUM 40 MG: 40 TABLET, DELAYED RELEASE ORAL at 06:04

## 2022-04-24 RX ADMIN — IPRATROPIUM BROMIDE AND ALBUTEROL SULFATE 3 ML: .5; 3 SOLUTION RESPIRATORY (INHALATION) at 11:03

## 2022-04-24 RX ADMIN — IBUPROFEN 600 MG: 600 TABLET, FILM COATED ORAL at 10:09

## 2022-04-24 RX ADMIN — BUDESONIDE AND FORMOTEROL FUMARATE DIHYDRATE 2 PUFF: 160; 4.5 AEROSOL RESPIRATORY (INHALATION) at 07:22

## 2022-04-24 RX ADMIN — Medication 10 ML: at 21:20

## 2022-04-24 RX ADMIN — IPRATROPIUM BROMIDE AND ALBUTEROL SULFATE 3 ML: .5; 3 SOLUTION RESPIRATORY (INHALATION) at 18:25

## 2022-04-24 RX ADMIN — ENOXAPARIN SODIUM 40 MG: 40 INJECTION SUBCUTANEOUS at 21:19

## 2022-04-24 RX ADMIN — MONTELUKAST 10 MG: 10 TABLET, FILM COATED ORAL at 21:19

## 2022-04-24 RX ADMIN — ACETAMINOPHEN 650 MG: 325 TABLET ORAL at 08:41

## 2022-04-24 RX ADMIN — METHYLPREDNISOLONE SODIUM SUCCINATE 40 MG: 40 INJECTION, POWDER, FOR SOLUTION INTRAMUSCULAR; INTRAVENOUS at 06:04

## 2022-04-24 RX ADMIN — Medication 10 ML: at 09:00

## 2022-04-24 RX ADMIN — DOXYCYCLINE 100 MG: 100 CAPSULE ORAL at 08:41

## 2022-04-25 ENCOUNTER — READMISSION MANAGEMENT (OUTPATIENT)
Dept: CALL CENTER | Facility: HOSPITAL | Age: 27
End: 2022-04-25

## 2022-04-25 VITALS
HEART RATE: 109 BPM | DIASTOLIC BLOOD PRESSURE: 92 MMHG | OXYGEN SATURATION: 95 % | SYSTOLIC BLOOD PRESSURE: 121 MMHG | RESPIRATION RATE: 18 BRPM | WEIGHT: 248 LBS | TEMPERATURE: 98 F | BODY MASS INDEX: 48.69 KG/M2 | HEIGHT: 60 IN

## 2022-04-25 LAB
ANION GAP SERPL CALCULATED.3IONS-SCNC: 10 MMOL/L (ref 5–15)
BUN SERPL-MCNC: 20 MG/DL (ref 6–20)
BUN/CREAT SERPL: 35.7 (ref 7–25)
CALCIUM SPEC-SCNC: 8.9 MG/DL (ref 8.6–10.5)
CHLORIDE SERPL-SCNC: 101 MMOL/L (ref 98–107)
CO2 SERPL-SCNC: 23 MMOL/L (ref 22–29)
CREAT SERPL-MCNC: 0.56 MG/DL (ref 0.57–1)
EGFRCR SERPLBLD CKD-EPI 2021: 129.3 ML/MIN/1.73
GLUCOSE BLDC GLUCOMTR-MCNC: 107 MG/DL (ref 70–130)
GLUCOSE BLDC GLUCOMTR-MCNC: 132 MG/DL (ref 70–130)
GLUCOSE SERPL-MCNC: 160 MG/DL (ref 65–99)
POTASSIUM SERPL-SCNC: 4.1 MMOL/L (ref 3.5–5.2)
SODIUM SERPL-SCNC: 134 MMOL/L (ref 136–145)

## 2022-04-25 PROCEDURE — 25010000002 ENOXAPARIN PER 10 MG: Performed by: INTERNAL MEDICINE

## 2022-04-25 PROCEDURE — 94799 UNLISTED PULMONARY SVC/PX: CPT

## 2022-04-25 PROCEDURE — 94664 DEMO&/EVAL PT USE INHALER: CPT

## 2022-04-25 PROCEDURE — 63710000001 PREDNISONE PER 1 MG: Performed by: INTERNAL MEDICINE

## 2022-04-25 PROCEDURE — 82962 GLUCOSE BLOOD TEST: CPT

## 2022-04-25 PROCEDURE — 99239 HOSP IP/OBS DSCHRG MGMT >30: CPT | Performed by: NURSE PRACTITIONER

## 2022-04-25 PROCEDURE — 80048 BASIC METABOLIC PNL TOTAL CA: CPT | Performed by: INTERNAL MEDICINE

## 2022-04-25 RX ORDER — IPRATROPIUM BROMIDE AND ALBUTEROL SULFATE 2.5; .5 MG/3ML; MG/3ML
3 SOLUTION RESPIRATORY (INHALATION) EVERY 4 HOURS
Qty: 360 ML | Refills: 0 | Status: SHIPPED | OUTPATIENT
Start: 2022-04-25

## 2022-04-25 RX ORDER — OMEPRAZOLE 20 MG/1
20 TABLET, DELAYED RELEASE ORAL DAILY PRN
Qty: 30 TABLET | Refills: 0 | Status: SHIPPED | OUTPATIENT
Start: 2022-04-25

## 2022-04-25 RX ORDER — PREDNISONE 10 MG/1
TABLET ORAL
Qty: 36 TABLET | Refills: 0 | Status: SHIPPED | OUTPATIENT
Start: 2022-04-26 | End: 2022-05-07

## 2022-04-25 RX ORDER — ACETAMINOPHEN 325 MG/1
650 TABLET ORAL EVERY 4 HOURS PRN
Start: 2022-04-25

## 2022-04-25 RX ORDER — DOXYCYCLINE 100 MG/1
100 CAPSULE ORAL EVERY 12 HOURS SCHEDULED
Qty: 4 CAPSULE | Refills: 0 | Status: SHIPPED | OUTPATIENT
Start: 2022-04-25 | End: 2022-04-27

## 2022-04-25 RX ORDER — ALBUTEROL SULFATE 2.5 MG/3ML
2.5 SOLUTION RESPIRATORY (INHALATION) EVERY 4 HOURS PRN
Qty: 180 ML | Refills: 0 | Status: SHIPPED | OUTPATIENT
Start: 2022-04-25 | End: 2022-04-27

## 2022-04-25 RX ADMIN — PREDNISONE 60 MG: 20 TABLET ORAL at 08:06

## 2022-04-25 RX ADMIN — ACETAMINOPHEN 650 MG: 325 TABLET ORAL at 02:26

## 2022-04-25 RX ADMIN — PANTOPRAZOLE SODIUM 40 MG: 40 TABLET, DELAYED RELEASE ORAL at 08:06

## 2022-04-25 RX ADMIN — IPRATROPIUM BROMIDE AND ALBUTEROL SULFATE 3 ML: .5; 3 SOLUTION RESPIRATORY (INHALATION) at 11:33

## 2022-04-25 RX ADMIN — IPRATROPIUM BROMIDE AND ALBUTEROL SULFATE 3 ML: .5; 3 SOLUTION RESPIRATORY (INHALATION) at 03:45

## 2022-04-25 RX ADMIN — BUDESONIDE AND FORMOTEROL FUMARATE DIHYDRATE 2 PUFF: 160; 4.5 AEROSOL RESPIRATORY (INHALATION) at 07:58

## 2022-04-25 RX ADMIN — Medication 10 ML: at 08:07

## 2022-04-25 RX ADMIN — IPRATROPIUM BROMIDE AND ALBUTEROL SULFATE 3 ML: .5; 3 SOLUTION RESPIRATORY (INHALATION) at 07:58

## 2022-04-25 RX ADMIN — ENOXAPARIN SODIUM 40 MG: 40 INJECTION SUBCUTANEOUS at 08:06

## 2022-04-25 RX ADMIN — DOXYCYCLINE 100 MG: 100 CAPSULE ORAL at 08:06

## 2022-04-26 ENCOUNTER — TRANSITIONAL CARE MANAGEMENT TELEPHONE ENCOUNTER (OUTPATIENT)
Dept: CALL CENTER | Facility: HOSPITAL | Age: 27
End: 2022-04-26

## 2022-04-26 NOTE — OUTREACH NOTE
Call Center TCM Note    Flowsheet Row Responses   [facility] patient discharged from? [place]   Does the patient have one of the following disease processes/diagnoses(primary or secondary)? Other   TCM attempt successful? No   Unsuccessful attempts Attempt 2          Shaila Severino RN    4/26/2022, 14:28 EDT

## 2022-04-26 NOTE — OUTREACH NOTE
Call Center TCM Note    Flowsheet Row Responses   [facility] patient discharged from? [place]   Does the patient have one of the following disease processes/diagnoses(primary or secondary)? Other   TCM attempt successful? No  [Verbal release for contact]   Unsuccessful attempts Attempt 1          Shaila Severino RN    4/26/2022, 12:51 EDT

## 2022-04-26 NOTE — OUTREACH NOTE
Prep Survey    Flowsheet Row Responses   Religion facility patient discharged from? [place]   Is LACE score < 7 ? No   Emergency Room discharge w/ pulse ox? No   Eligibility [facility]   Date of Admission 04/21/22   Date of Discharge 04/25/22   Discharge Disposition Home or Self Care   Discharge diagnosis Moderate asthma   Does the patient have one of the following disease processes/diagnoses(primary or secondary)? Other   Does the patient have Home health ordered? No   Is there a DME ordered? No   Prep survey completed? Yes          ADRYAN STATON - Registered Nurse

## 2022-04-27 ENCOUNTER — OFFICE VISIT (OUTPATIENT)
Dept: INTERNAL MEDICINE | Facility: CLINIC | Age: 27
End: 2022-04-27

## 2022-04-27 ENCOUNTER — TRANSITIONAL CARE MANAGEMENT TELEPHONE ENCOUNTER (OUTPATIENT)
Dept: CALL CENTER | Facility: HOSPITAL | Age: 27
End: 2022-04-27

## 2022-04-27 ENCOUNTER — TELEPHONE (OUTPATIENT)
Dept: INTERNAL MEDICINE | Facility: CLINIC | Age: 27
End: 2022-04-27

## 2022-04-27 VITALS
SYSTOLIC BLOOD PRESSURE: 122 MMHG | HEIGHT: 60 IN | RESPIRATION RATE: 22 BRPM | TEMPERATURE: 97.3 F | OXYGEN SATURATION: 96 % | HEART RATE: 86 BPM | DIASTOLIC BLOOD PRESSURE: 66 MMHG | WEIGHT: 256.6 LBS | BODY MASS INDEX: 50.38 KG/M2

## 2022-04-27 DIAGNOSIS — J45.50 SEVERE PERSISTENT ASTHMA WITHOUT COMPLICATION: ICD-10-CM

## 2022-04-27 DIAGNOSIS — J45.51 SEVERE PERSISTENT ASTHMA WITH ACUTE EXACERBATION: Primary | ICD-10-CM

## 2022-04-27 DIAGNOSIS — T38.0X5A STEROID-INDUCED HYPERGLYCEMIA: ICD-10-CM

## 2022-04-27 DIAGNOSIS — R73.9 STEROID-INDUCED HYPERGLYCEMIA: ICD-10-CM

## 2022-04-27 LAB
ALBUMIN SERPL-MCNC: 4 G/DL (ref 3.5–5.2)
ALBUMIN/GLOB SERPL: 1.8 G/DL
ALP SERPL-CCNC: 54 U/L (ref 39–117)
ALT SERPL W P-5'-P-CCNC: 28 U/L (ref 1–33)
ANION GAP SERPL CALCULATED.3IONS-SCNC: 11.4 MMOL/L (ref 5–15)
AST SERPL-CCNC: 18 U/L (ref 1–32)
BILIRUB SERPL-MCNC: 0.3 MG/DL (ref 0–1.2)
BUN SERPL-MCNC: 15 MG/DL (ref 6–20)
BUN/CREAT SERPL: 23.4 (ref 7–25)
CALCIUM SPEC-SCNC: 8.8 MG/DL (ref 8.6–10.5)
CHLORIDE SERPL-SCNC: 103 MMOL/L (ref 98–107)
CO2 SERPL-SCNC: 22.6 MMOL/L (ref 22–29)
CREAT SERPL-MCNC: 0.64 MG/DL (ref 0.57–1)
DACRYOCYTES BLD QL SMEAR: ABNORMAL
DEPRECATED RDW RBC AUTO: 39.8 FL (ref 37–54)
EGFRCR SERPLBLD CKD-EPI 2021: 125.2 ML/MIN/1.73
ERYTHROCYTE [DISTWIDTH] IN BLOOD BY AUTOMATED COUNT: 15.9 % (ref 12.3–15.4)
EXPIRATION DATE: NORMAL
GLOBULIN UR ELPH-MCNC: 2.2 GM/DL
GLUCOSE BLDC GLUCOMTR-MCNC: 126 MG/DL (ref 70–130)
GLUCOSE SERPL-MCNC: 147 MG/DL (ref 65–99)
HCT VFR BLD AUTO: 44.7 % (ref 34–46.6)
HGB BLD-MCNC: 13.9 G/DL (ref 12–15.9)
LYMPHOCYTES # BLD MANUAL: 2.07 10*3/MM3 (ref 0.7–3.1)
LYMPHOCYTES NFR BLD MANUAL: 4 % (ref 5–12)
Lab: NORMAL
MCH RBC QN AUTO: 23.3 PG (ref 26.6–33)
MCHC RBC AUTO-ENTMCNC: 31.1 G/DL (ref 31.5–35.7)
MCV RBC AUTO: 74.9 FL (ref 79–97)
MONOCYTES # BLD: 0.59 10*3/MM3 (ref 0.1–0.9)
NEUTROPHILS # BLD AUTO: 12.1 10*3/MM3 (ref 1.7–7)
NEUTROPHILS NFR BLD MANUAL: 82 % (ref 42.7–76)
NRBC BLD AUTO-RTO: 0 /100 WBC (ref 0–0.2)
PLAT MORPH BLD: NORMAL
PLATELET # BLD AUTO: 348 10*3/MM3 (ref 140–450)
PMV BLD AUTO: 11.5 FL (ref 6–12)
POTASSIUM SERPL-SCNC: 3.9 MMOL/L (ref 3.5–5.2)
PROT SERPL-MCNC: 6.2 G/DL (ref 6–8.5)
RBC # BLD AUTO: 5.97 10*6/MM3 (ref 3.77–5.28)
SODIUM SERPL-SCNC: 137 MMOL/L (ref 136–145)
VARIANT LYMPHS NFR BLD MANUAL: 14 % (ref 19.6–45.3)
WBC MORPH BLD: NORMAL
WBC NRBC COR # BLD: 14.76 10*3/MM3 (ref 3.4–10.8)

## 2022-04-27 PROCEDURE — 85007 BL SMEAR W/DIFF WBC COUNT: CPT | Performed by: NURSE PRACTITIONER

## 2022-04-27 PROCEDURE — 99214 OFFICE O/P EST MOD 30 MIN: CPT | Performed by: NURSE PRACTITIONER

## 2022-04-27 PROCEDURE — 85025 COMPLETE CBC W/AUTO DIFF WBC: CPT | Performed by: NURSE PRACTITIONER

## 2022-04-27 PROCEDURE — 80053 COMPREHEN METABOLIC PANEL: CPT | Performed by: NURSE PRACTITIONER

## 2022-04-27 PROCEDURE — 82962 GLUCOSE BLOOD TEST: CPT | Performed by: NURSE PRACTITIONER

## 2022-04-27 RX ORDER — ALBUTEROL SULFATE 2.5 MG/3ML
2.5 SOLUTION RESPIRATORY (INHALATION) EVERY 4 HOURS PRN
Qty: 90 EACH | Refills: 11 | Status: SHIPPED | OUTPATIENT
Start: 2022-04-27 | End: 2022-09-20

## 2022-04-27 RX ORDER — ALBUTEROL SULFATE 90 UG/1
2 AEROSOL, METERED RESPIRATORY (INHALATION) EVERY 4 HOURS PRN
Qty: 18 G | Refills: 5 | Status: SHIPPED | OUTPATIENT
Start: 2022-04-27

## 2022-04-27 RX ORDER — MONTELUKAST SODIUM 10 MG/1
10 TABLET ORAL NIGHTLY
Qty: 30 TABLET | Refills: 5 | Status: SHIPPED | OUTPATIENT
Start: 2022-04-27 | End: 2022-12-21

## 2022-04-27 NOTE — OUTREACH NOTE
Call Center TCM Note    Flowsheet Row Responses   [facility] patient discharged from? [place]   Does the patient have one of the following disease processes/diagnoses(primary or secondary)? Other   TCM attempt successful? No   Unsuccessful attempts Attempt 3  [Ramon on verbal release ]          Chantale Riggs RN    4/27/2022, 08:35 EDT

## 2022-04-27 NOTE — PROGRESS NOTES
Transitional Care Follow Up Visit  Subjective   Chief Complaint   Patient presents with   • Asthma     Hospital followup Discharged from [facility] 4/25/22. TCM       Yoli Regan is a 27 y.o. female who presents for a transitional care management visit.    Within 48 business hours after discharge our office contacted her via telephone to coordinate her care and needs.      I reviewed and discussed the details of that call along with the discharge summary, hospital problems, inpatient lab results, inpatient diagnostic studies, and consultation reports with Yoli.     Current outpatient and discharge medications have been reconciled for the patient.  Reviewed by: WILLIE Soni      Date of TCM Phone Call 4/25/2022   [facility]   Date of Admission 4/21/2022   Date of Discharge 4/25/2022   Discharge Disposition Home or Self Care     Risk for Readmission (LACE) Score: 9 (4/25/2022  6:01 AM)      History of Present Illness   Course During Hospital Stay:     Copied from discharge summary:  Yoli Regan is a 27 y.o. female w/ asthma,obesity who follows w/ Dr. Dorman of pulmonary as an outpatient. Patient developed worsening dyspnea and wheezing prompting her to initial go to [facility] ED where reportedly received magnesium, steroids, nebulizer and was discharged on oral steroids. Patient initially felt better but then worsened again w/ significant wheezing and chest tightness prompting her to come to [facility] ED. In ED room air sats were 84%, w/ significant wheezing and admitted to hospitalist service. cxr was negative. Initial wbc was normal. D-dimer was normal on admission. Initiated on iv steroids, bronchodilators.     *Acute hypoxic respiratory failure (due to below), resolved  *Acute exacerbation of Asthma, improved  *Rhinovirus +  -initial cxr negative  -initial d-dimer negative  -procal negative  -respiratory pcr panel + rhinovirus (negative covid 19)  -due to worsening hypoxia (6Lnc  requirement on 4/22/22) obtained CT angio chest 4/22/22 negative for PE, linear atelectatic changes noted but no over mass or pneumonia noted  -continue scheduled & prn duonebs  -continue empiric doxy (day #4/5)  -continue home symbicort.  -continue steroid taper with PPI      *Obesity  *Steroid induced hyperglycemia  -a1c 5.7        Discharge Follow Up Recommendations for outpatient labs/diagnostics:  PCP this week  Pulm follow up 1 month    The patient was hospitalized from  3/23/22/-4/21/22. The records have been received and reviewed. The medication list has been reconciled. Hospital stay complicated with hypoxia and steroid-induced hyperglycemia.  Treatments included IV antibiotics, oral antibiotics, IV steroids and oral steroids, and  adjustments in the patients medication regimen and oral steroids.      Since leaving the hospital they report that they are improving and almost back to normal. Patient has a dry cough and wheezing intermittently (improved), Denies   fever, a headache, nasal discharge, nausea, and a  sore throat, complaints of  chest pain, dyspnea, edema, syncope, blurred vision or palpitations.     The following portions of the patient's history were reviewed and updated as appropriate: allergies, current medications, past medical history, past social history, past surgical history and problem list.    Review of Systems   Constitutional: Negative for fatigue and fever.   HENT: Negative for congestion, ear pain, mouth sores, postnasal drip, sinus pressure, sinus pain, sneezing and sore throat.    Eyes: Negative for visual disturbance.   Respiratory: Positive for cough and wheezing (improved). Negative for chest tightness and shortness of breath.    Cardiovascular: Negative for chest pain.   Gastrointestinal: Negative for abdominal pain, diarrhea, nausea and vomiting.   Musculoskeletal: Negative for arthralgias and myalgias.   Skin: Negative for color change.   Neurological: Negative for  dizziness, weakness and headaches.   Hematological: Negative for adenopathy.   Psychiatric/Behavioral: Negative for dysphoric mood. The patient is not nervous/anxious.        Objective   Physical Exam  Constitutional:       Appearance: She is not ill-appearing.   HENT:      Nose: No rhinorrhea.   Cardiovascular:      Rate and Rhythm: Normal rate and regular rhythm.      Heart sounds: No murmur heard.  Pulmonary:      Effort: No respiratory distress.      Breath sounds: No stridor. No wheezing, rhonchi or rales.   Lymphadenopathy:      Cervical: No cervical adenopathy.         Assessment/Plan   Diagnoses and all orders for this visit:    1. Severe persistent asthma with acute exacerbation (Primary)  -     albuterol sulfate  (90 Base) MCG/ACT inhaler; Inhale 2 puffs Every 4 (Four) Hours As Needed for Wheezing.  Dispense: 18 g; Refill: 5  -     albuterol (PROVENTIL) (2.5 MG/3ML) 0.083% nebulizer solution; Take 2.5 mg by nebulization Every 4 (Four) Hours As Needed for Wheezing.  Dispense: 90 each; Refill: 11  -     Comprehensive metabolic panel; Future  -     CBC w AUTO Differential; Future  -     CBC w AUTO Differential  -     Comprehensive metabolic panel  -     Cancel: Manual Differential  -     Manual Differential    2. Severe persistent asthma without complication  -     montelukast (SINGULAIR) 10 MG tablet; Take 1 tablet by mouth Every Night.  Dispense: 30 tablet; Refill: 5    3. Steroid-induced hyperglycemia  -     POC Glucose            Transitional Care Management Certification  I certify that the following are true:  1. Communication was made within 2 business days of discharge.  2. Complexity of Medical Decision Making is moderate.  3. Face to face visit occurred within 6 days.    *Note: 26855 is for high complexity patients with a face to face visit within 7 days of discharge.  80281 is for high complexity patients with a face to face on days 8-14 post discharge or medium complexity with face to face  visit within 14 days post discharge.

## 2022-04-29 ENCOUNTER — TELEPHONE (OUTPATIENT)
Dept: INTERNAL MEDICINE | Facility: CLINIC | Age: 27
End: 2022-04-29

## 2022-04-29 NOTE — TELEPHONE ENCOUNTER
Yoli Regan (Key: [id number])  [facility] Joshua[facility] [id number] - PA Case ID: [id number] - Rx #: [id number] help? Call us at [phone]  Status  Sent to [facility]  Albuterol Sulfate (2.5 MG/3ML)0.083% nebulizer solution  Form  [employer] [place] Medicaid ePA Form 2017 NCPDP  Original Claim Info  9G TRANS FEE = 0.00EXCEEDING QUANTITY LIMIT; PRIOR AUTHORIZATION REQUIRED CALL [phone]

## 2022-05-04 ENCOUNTER — READMISSION MANAGEMENT (OUTPATIENT)
Dept: CALL CENTER | Facility: HOSPITAL | Age: 27
End: 2022-05-04

## 2022-05-04 NOTE — OUTREACH NOTE
Medical Week 2 Survey    Flowsheet Row Responses   [facility] patient discharged from? [facility]   Does the patient have one of the following disease processes/diagnoses(primary or secondary)? Other   Week 2 attempt successful? No   Unsuccessful attempts Attempt 1          MERY MENDEZ - Registered Nurse

## 2022-05-06 ENCOUNTER — READMISSION MANAGEMENT (OUTPATIENT)
Dept: CALL CENTER | Facility: HOSPITAL | Age: 27
End: 2022-05-06

## 2022-05-06 NOTE — OUTREACH NOTE
Medical Week 2 Survey    Flowsheet Row Responses   [facility] patient discharged from? [facility]   Does the patient have one of the following disease processes/diagnoses(primary or secondary)? Other   Week 2 attempt successful? No   Unsuccessful attempts Attempt 2   Discharge diagnosis Moderate asthma   Wrap up additional comments UTR x 2 attempts          CRYSTAL MENDEZ - Registered Nurse

## 2022-05-16 ENCOUNTER — READMISSION MANAGEMENT (OUTPATIENT)
Dept: CALL CENTER | Facility: HOSPITAL | Age: 27
End: 2022-05-16

## 2022-05-16 NOTE — OUTREACH NOTE
Medical Week 3 Survey    Flowsheet Row Responses   [facility] patient discharged from? [facility]   Does the patient have one of the following disease processes/diagnoses(primary or secondary)? Other   Week 3 attempt successful? Yes   Call start time 1149   Call end time 1151   Discharge diagnosis Moderate asthma   Meds reviewed with patient/caregiver? Yes   Is the patient having any side effects they believe may be caused by any medication additions or changes? No   Does the patient have all medications ordered at discharge? Yes   Prescription comments Had to go to [facility] for her Steroid prescription.    Is the patient taking all medications as directed (includes completed medication regime)? Yes   Does the patient have a primary care provider?  Yes   Does the patient have an appointment with their PCP within 7 days of discharge? Yes   Has the patient kept scheduled appointments due by today? Yes   Did the patient receive a copy of their discharge instructions? Yes   Nursing interventions Reviewed instructions with patient   What is the patient's perception of their health status since discharge? Improving   Is the patient/caregiver able to teach back signs and symptoms related to disease process for when to call PCP? Yes   Is the patient/caregiver able to teach back signs and symptoms related to disease process for when to call 911? Yes   Is the patient/caregiver able to teach back the hierarchy of who to call/visit for symptoms/problems? PCP, Specialist, Home health nurse, Urgent Care, ED, 911 Yes   If the patient is a current smoker, are they able to teach back resources for cessation? Not a smoker   Week 3 Call Completed? Yes   Wrap up additional comments Pt states she is improved. No needs identified.           ABEBE LANDA - Registered Nurse

## 2022-05-25 ENCOUNTER — OFFICE VISIT (OUTPATIENT)
Dept: PULMONOLOGY | Facility: CLINIC | Age: 27
End: 2022-05-25

## 2022-05-25 VITALS
TEMPERATURE: 98 F | DIASTOLIC BLOOD PRESSURE: 76 MMHG | WEIGHT: 253 LBS | HEART RATE: 81 BPM | SYSTOLIC BLOOD PRESSURE: 102 MMHG | BODY MASS INDEX: 49.67 KG/M2 | OXYGEN SATURATION: 99 % | HEIGHT: 60 IN

## 2022-05-25 DIAGNOSIS — J45.50 SEVERE PERSISTENT ASTHMA WITHOUT COMPLICATION: Primary | ICD-10-CM

## 2022-05-25 DIAGNOSIS — K21.9 GASTROESOPHAGEAL REFLUX DISEASE, UNSPECIFIED WHETHER ESOPHAGITIS PRESENT: ICD-10-CM

## 2022-05-25 DIAGNOSIS — Z01.812 BLOOD TESTS PRIOR TO TREATMENT OR PROCEDURE: Primary | ICD-10-CM

## 2022-05-25 DIAGNOSIS — Z91.09 ENVIRONMENTAL ALLERGIES: ICD-10-CM

## 2022-05-25 PROCEDURE — 99214 OFFICE O/P EST MOD 30 MIN: CPT | Performed by: NURSE PRACTITIONER

## 2022-05-25 NOTE — PROGRESS NOTES
"[facility] Pulmonary Follow up      Chief Complaint  Shortness of Breath (1 mo Hosp F/u)    Subjective          Yoli Regan presents to [facility] for posthospitalization follow-up.    She has been seen here in the office in June of last year by Dr. Dorman for asthma and shortness of breath.  She was then hospitalized in April of this year for asthma, she her rhinovirus was positive.  Her chest x-ray and CT scan were negative for any pneumonia.  She completed empiric antibiotics.    Over the last year she has had multiple hospitalizations both at [facility] and at [facility].  She feels like she is struggling all the time.  She has been on oral steroids most of the year as well as her combination ICS/LABA, Dulera, without much benefit.  She just finished her prednisone taper from the hospital two weeka ago.    At time she states she she is waking up gasping with her chest tight during the night.  She does use her nebulizers frequently.  It does seem to help the wheezing and tightness some.  On bad days she uses her nebulizers up to every 2 hours.  After recent hospitalization was sent home on duo nebs which she has been able to use about every 4 hours.        Objective     Vital Signs:   /76 (BP Location: Left arm, Patient Position: Sitting, Cuff Size: Large Adult)   Pulse 81   Temp 98 °F (36.7 °C) (Infrared)   Ht 152.4 cm (60\")   Wt 115 kg (253 lb)   SpO2 99% Comment: room air, at rest  BMI 49.41 kg/m²       Immunization History   Administered Date(s) Administered   • Flu Vaccine Split Quad 10/19/2016, 11/11/2020   • FluLaval/Fluarix/Fluzone >6 11/29/2018, 01/09/2020, 11/11/2020   • HPV Quadrivalent 03/24/2009, 05/29/2009   • Hep A, 2 Dose 02/24/2009   • Hepatitis B Vaccine Adult IM 11/29/2018, 01/09/2020   • Meningococcal MCV4P (Menactra) 02/24/2009   • Pneumococcal Polysaccharide (PPSV23) 10/19/2016, 11/11/2020   • Tdap 02/24/2009   • Varicella " 02/24/2009       Physical Exam  Vitals reviewed.   Constitutional:       General: She is not in acute distress.     Appearance: She is well-developed. She is obese.   HENT:      Head: Normocephalic and atraumatic.   Eyes:      Pupils: Pupils are equal, round, and reactive to light.   Cardiovascular:      Rate and Rhythm: Normal rate and regular rhythm.      Heart sounds: No murmur heard.  Pulmonary:      Effort: Pulmonary effort is normal. No respiratory distress.      Breath sounds: Wheezing and rales present.   Abdominal:      General: Bowel sounds are normal. There is no distension.      Palpations: Abdomen is soft.   Musculoskeletal:         General: Normal range of motion.      Cervical back: Normal range of motion and neck supple.   Skin:     General: Skin is warm and dry.      Findings: No erythema.   Neurological:      Mental Status: She is alert and oriented to person, place, and time.   Psychiatric:         Behavior: Behavior normal.          Result Review :{ Labs  Result Review  Imaging  Med Tab  Media :23}       Data reviewed: Radiologic studies CT 4/22/2022     FINDINGS:  VASCULAR FINDINGS: The timing sequence is somewhat delayed. A pulmonary  embolus is not definitely identified. The thoracic aorta does not appear  unusual.     NONVASCULAR FINDINGS: There are atelectatic changes in the right upper  lobe and left basilar area. There are no large pleural effusions. There  is no pathologic appearing mediastinal lymphadenopathy.        IMPRESSION:  1.  A pulmonary embolus is not definitely identified.  2.  Atelectatic changes are noted involving the lungs.                    Assessment and Plan    Problem List Items Addressed This Visit        Allergies and Adverse Reactions    Environmental allergies       Gastrointestinal Abdominal     GERD (gastroesophageal reflux disease)       Pulmonary and Pneumonias    Severe persistent asthma without complication - Primary    Relevant Medications     Fluticasone-Umeclidin-Vilant (TRELEGY) 200-62.5-25 MCG/INH inhaler    Other Relevant Orders    Allergens, Zone 8    Aspergillus Antibodies    CBC & Differential    IgE Level        At this point we need to get a better idea on her asthma.  Like to follow-up with allergic asthma labs to see if she would be a candidate for biologic agent.  Her asthma is currently very uncontrolled with frequent hospitalizations and exacerbations as well as use of steroids chronically for the last year.    She also needs to return for full PFTs    I am good to go ahead and give her a sample of Trelegy to add on the LAMA she is certainly air trapping at this point.    On her next visit we need to evaluate whether her reflux may be causing her worsening or continued asthma symptoms as well.    Follow Up     Return in about 2 weeks (around 6/8/2022).  Patient was given instructions and counseling regarding her condition or for health maintenance advice. Please see specific information pulled into the AVS if appropriate.       Moderate level of Medical Decision Making complexity based on one chronic illness with exacerbation and worsening with further workup and prescription drug management.    WILLIE Concepcion, ACNP  [facility]  Electronically signed

## 2022-06-03 ENCOUNTER — LAB (OUTPATIENT)
Dept: PULMONOLOGY | Facility: CLINIC | Age: 27
End: 2022-06-03

## 2022-06-03 ENCOUNTER — CLINICAL SUPPORT NO REQUIREMENTS (OUTPATIENT)
Dept: PULMONOLOGY | Facility: CLINIC | Age: 27
End: 2022-06-03

## 2022-06-03 DIAGNOSIS — Z01.812 BLOOD TESTS PRIOR TO TREATMENT OR PROCEDURE: ICD-10-CM

## 2022-06-03 DIAGNOSIS — J45.50 SEVERE PERSISTENT ASTHMA WITHOUT COMPLICATION: ICD-10-CM

## 2022-06-03 LAB
BASOPHILS # BLD AUTO: 0.07 10*3/MM3 (ref 0–0.2)
BASOPHILS NFR BLD AUTO: 1.1 % (ref 0–1.5)
DEPRECATED RDW RBC AUTO: 38.4 FL (ref 37–54)
EOSINOPHIL # BLD AUTO: 0.31 10*3/MM3 (ref 0–0.4)
EOSINOPHIL NFR BLD AUTO: 4.9 % (ref 0.3–6.2)
ERYTHROCYTE [DISTWIDTH] IN BLOOD BY AUTOMATED COUNT: 15.6 % (ref 12.3–15.4)
HCT VFR BLD AUTO: 41.9 % (ref 34–46.6)
HGB BLD-MCNC: 13.1 G/DL (ref 12–15.9)
LYMPHOCYTES # BLD AUTO: 1.78 10*3/MM3 (ref 0.7–3.1)
LYMPHOCYTES NFR BLD AUTO: 28.4 % (ref 19.6–45.3)
MCH RBC QN AUTO: 22.7 PG (ref 26.6–33)
MCHC RBC AUTO-ENTMCNC: 31.3 G/DL (ref 31.5–35.7)
MCV RBC AUTO: 72.6 FL (ref 79–97)
MONOCYTES # BLD AUTO: 0.57 10*3/MM3 (ref 0.1–0.9)
MONOCYTES NFR BLD AUTO: 9.1 % (ref 5–12)
NEUTROPHILS NFR BLD AUTO: 3.53 10*3/MM3 (ref 1.7–7)
NEUTROPHILS NFR BLD AUTO: 56.3 % (ref 42.7–76)
PLATELET # BLD AUTO: 340 10*3/MM3 (ref 140–450)
PMV BLD AUTO: 11.9 FL (ref 6–12)
RBC # BLD AUTO: 5.77 10*6/MM3 (ref 3.77–5.28)
WBC NRBC COR # BLD: 6.27 10*3/MM3 (ref 3.4–10.8)

## 2022-06-03 PROCEDURE — 86003 ALLG SPEC IGE CRUDE XTRC EA: CPT | Performed by: NURSE PRACTITIONER

## 2022-06-03 PROCEDURE — 99211 OFF/OP EST MAY X REQ PHY/QHP: CPT | Performed by: NURSE PRACTITIONER

## 2022-06-03 PROCEDURE — 82785 ASSAY OF IGE: CPT | Performed by: NURSE PRACTITIONER

## 2022-06-03 PROCEDURE — 85025 COMPLETE CBC W/AUTO DIFF WBC: CPT | Performed by: NURSE PRACTITIONER

## 2022-06-03 PROCEDURE — 86606 ASPERGILLUS ANTIBODY: CPT | Performed by: NURSE PRACTITIONER

## 2022-06-03 PROCEDURE — U0004 COV-19 TEST NON-CDC HGH THRU: HCPCS | Performed by: NURSE PRACTITIONER

## 2022-06-03 PROCEDURE — 36415 COLL VENOUS BLD VENIPUNCTURE: CPT | Performed by: NURSE PRACTITIONER

## 2022-06-04 LAB — SARS-COV-2 RNA NOSE QL NAA+PROBE: NOT DETECTED

## 2022-06-06 ENCOUNTER — DOCUMENTATION (OUTPATIENT)
Dept: PULMONOLOGY | Facility: CLINIC | Age: 27
End: 2022-06-06

## 2022-06-06 ENCOUNTER — OFFICE VISIT (OUTPATIENT)
Dept: PULMONOLOGY | Facility: CLINIC | Age: 27
End: 2022-06-06

## 2022-06-06 VITALS
SYSTOLIC BLOOD PRESSURE: 102 MMHG | HEART RATE: 92 BPM | WEIGHT: 251.8 LBS | DIASTOLIC BLOOD PRESSURE: 68 MMHG | TEMPERATURE: 97.7 F | OXYGEN SATURATION: 97 % | BODY MASS INDEX: 49.43 KG/M2 | HEIGHT: 60 IN

## 2022-06-06 DIAGNOSIS — K21.9 GASTROESOPHAGEAL REFLUX DISEASE, UNSPECIFIED WHETHER ESOPHAGITIS PRESENT: ICD-10-CM

## 2022-06-06 DIAGNOSIS — J45.50 SEVERE PERSISTENT ASTHMA WITHOUT COMPLICATION: Primary | ICD-10-CM

## 2022-06-06 DIAGNOSIS — Z91.09 ENVIRONMENTAL ALLERGIES: ICD-10-CM

## 2022-06-06 PROBLEM — J45.41 MODERATE PERSISTENT ASTHMA WITH EXACERBATION: Status: RESOLVED | Noted: 2022-04-21 | Resolved: 2022-06-06

## 2022-06-06 PROCEDURE — 94729 DIFFUSING CAPACITY: CPT | Performed by: NURSE PRACTITIONER

## 2022-06-06 PROCEDURE — 99214 OFFICE O/P EST MOD 30 MIN: CPT | Performed by: NURSE PRACTITIONER

## 2022-06-06 PROCEDURE — 94375 RESPIRATORY FLOW VOLUME LOOP: CPT | Performed by: NURSE PRACTITIONER

## 2022-06-06 PROCEDURE — 94726 PLETHYSMOGRAPHY LUNG VOLUMES: CPT | Performed by: NURSE PRACTITIONER

## 2022-06-06 RX ORDER — MULTIVIT-MIN/IRON/FOLIC ACID/K 18-600-40
1 CAPSULE ORAL DAILY
COMMUNITY

## 2022-06-06 NOTE — PROGRESS NOTES
"[facility] Pulmonary Follow up      Chief Complaint  Asthma (1-2 wk f/u)    Subjective          Yoli Regan presents to [facility] for follow-up on her severe persistent asthma.  During her last follow-up visit she was having quite a bit of issues with her asthma with recurrent exacerbations and hospitalizations.    Went ahead and put her on Trelegy for the addition of the LAMA.  She is actually done much better.  She is only using her nebulizers about once a day.  Overall she is feeling much better.  She has less chest tightness and wheezing.    She does still wake up occasionally with wheezing at night.  We did discuss the relevance of chronic reflux disease and asthma symptoms.  At this time she is taking her PPI, she is not eating close to bed she usually eats around 8 does not go to bed until midnight.  She does drink water throughout the night secondary to dry mouth.  She also props up on several pillows to sleep.    We are awaiting her labs including her IgE and allergen panel.  Her eosinophil count was normal.  I also was following up and Aspergillus given her persistent symptoms, however with a normal eosinophil count is likely negative.      Objective     Vital Signs:   /68 (BP Location: Right arm, Patient Position: Sitting, Cuff Size: Large Adult)   Pulse 92   Temp 97.7 °F (36.5 °C) (Infrared)   Ht 152.4 cm (60\")   Wt 114 kg (251 lb 12.8 oz)   SpO2 97% Comment: room air,  at rest  BMI 49.18 kg/m²       Immunization History   Administered Date(s) Administered   • Flu Vaccine Split Quad 10/19/2016, 11/11/2020   • FluLaval/Fluarix/Fluzone >6 11/29/2018, 01/09/2020, 11/11/2020   • HPV Quadrivalent 03/24/2009, 05/29/2009   • Hep A, 2 Dose 02/24/2009   • Hepatitis B Vaccine Adult IM 11/29/2018, 01/09/2020   • Meningococcal MCV4P (Menactra) 02/24/2009   • Pneumococcal Polysaccharide (PPSV23) 10/19/2016, 11/11/2020   • Tdap 02/24/2009   • " Varicella 02/24/2009       Physical Exam  Vitals reviewed.   Constitutional:       General: She is not in acute distress.     Appearance: She is well-developed. She is obese. She is not ill-appearing.   HENT:      Head: Normocephalic and atraumatic.   Eyes:      Pupils: Pupils are equal, round, and reactive to light.   Cardiovascular:      Rate and Rhythm: Normal rate and regular rhythm.      Heart sounds: No murmur heard.  Pulmonary:      Effort: Pulmonary effort is normal. No respiratory distress.      Breath sounds: Normal breath sounds. No wheezing or rales.   Abdominal:      General: Bowel sounds are normal. There is no distension.      Palpations: Abdomen is soft.   Musculoskeletal:         General: Normal range of motion.      Cervical back: Normal range of motion and neck supple.   Skin:     General: Skin is warm and dry.      Findings: No erythema.   Neurological:      Mental Status: She is alert and oriented to person, place, and time.   Psychiatric:         Behavior: Behavior normal.          Result Review :            PFTs done in the office today:  Significant improvement in her pulmonary function test from last year.  Her FVC is 3.36, 95% predicted.  No evidence obstruction or air trapping.  Normal DLCO.        Labs pending - IgE, allergen panel, aspergillus antibodies                  Assessment and Plan    Problem List Items Addressed This Visit        Allergies and Adverse Reactions    Environmental allergies       Gastrointestinal Abdominal     GERD (gastroesophageal reflux disease)       Pulmonary and Pneumonias    Severe persistent asthma without complication - Primary    Relevant Medications    Fluticasone-Umeclidin-Vilant (TRELEGY) 200-62.5-25 MCG/INH inhaler    Other Relevant Orders    Pulmonary Function Test (Completed)        At this time she is already doing much better with addition of the asthma dose Trelegy 200 mcg, with addition of the LAMA her shortness of breath and asthma symptoms  have improved.    Continue with her albuterol nebulizer as needed.    We did discuss an action plan for acute exacerbations, she will call the office if she has any worsening issues.    She does have her IgE and allergen panel pending, we will follow-up on those.  At this time we did discuss holding off on biologic therapy since her asthma seems better controlled on the current regimen.      Follow Up     Return in about 3 months (around 9/6/2022).  Patient was given instructions and counseling regarding her condition or for health maintenance advice. Please see specific information pulled into the AVS if appropriate.     I spent 35 minutes caring for Yoli on this date of service. This time includes time spent by me in the following activities:preparing for the visit, reviewing tests, obtaining and/or reviewing a separately obtained history, performing a medically appropriate examination and/or evaluation , counseling and educating the patient/family/caregiver, ordering medications, tests, or procedures, referring and communicating with other health care professionals , documenting information in the medical record and independently interpreting results and communicating that information with the patient/family/caregiver    Moderate level of Medical Decision Making complexity based on 2 or more chronic stable illnesses and prescription drug management.    WILLIE Concepcion, ACNP  [facility]  Electronically signed

## 2022-06-06 NOTE — PROGRESS NOTES
Fax from [facility] needing prior auth for Trelegy Ellipta 200 mcg.    Prior Auth has been approved from 6/6/2022 to 6/5/2023.

## 2022-06-07 LAB
A FLAVUS AB SER QL ID: NEGATIVE
A FUMIGATUS AB SER QL ID: NEGATIVE
A NIGER AB SER QL ID: NEGATIVE

## 2022-06-10 LAB — IGE SERPL-ACNC: 33 IU/ML (ref 6–495)

## 2022-06-17 LAB
A ALTERNATA IGE QN: <0.1 KU/L
A FUMIGATUS IGE QN: 0.52 KU/L
AMER ROACH IGE QN: <0.1 KU/L
BAHIA GRASS IGE QN: <0.1 KU/L
BERMUDA GRASS IGE QN: <0.1 KU/L
BOXELDER IGE QN: <0.1 KU/L
C HERBARUM IGE QN: <0.1 KU/L
CAT DANDER IGE QN: 0.53 KU/L
CMN PIGWEED IGE QN: <0.1 KU/L
COMMON RAGWEED IGE QN: <0.1 KU/L
CONV CLASS DESCRIPTION: ABNORMAL
D FARINAE IGE QN: 1.34 KU/L
D PTERONYSS IGE QN: 1.47 KU/L
DOG DANDER IGE QN: <0.1 KU/L
ENGL PLANTAIN IGE QN: <0.1 KU/L
HAZELNUT POLN IGE QN: ABNORMAL
JOHNSON GRASS IGE QN: <0.1 KU/L
KENT BLUE GRASS IGE QN: <0.1 KU/L
LONDON PLANE IGE QN: ABNORMAL
M RACEMOSUS IGE QN: ABNORMAL
MT JUNIPER IGE QN: <0.1 KU/L
MUGWORT IGE QN: <0.1 KU/L
NETTLE IGE QN: ABNORMAL
P NOTATUM IGE QN: 0.53 KU/L
S BOTRYOSUM IGE QN: ABNORMAL
SHEEP SORREL IGE QN: <0.1 KU/L
SWEET GUM IGE QN: ABNORMAL
WHITE ELM IGE QN: <0.1 KU/L
WHITE HICKORY IGE QN: ABNORMAL
WHITE MULBERRY IGE QN: ABNORMAL
WHITE OAK IGE QN: <0.1 KU/L

## 2022-07-02 ENCOUNTER — TELEMEDICINE (OUTPATIENT)
Dept: FAMILY MEDICINE CLINIC | Facility: TELEHEALTH | Age: 27
End: 2022-07-02

## 2022-07-02 DIAGNOSIS — K08.89 PAIN, DENTAL: ICD-10-CM

## 2022-07-02 DIAGNOSIS — K05.10 GINGIVITIS: Primary | ICD-10-CM

## 2022-07-02 PROCEDURE — 99213 OFFICE O/P EST LOW 20 MIN: CPT | Performed by: NURSE PRACTITIONER

## 2022-07-02 RX ORDER — CHLORHEXIDINE GLUCONATE 0.12 MG/ML
15 RINSE ORAL 2 TIMES DAILY
Qty: 473 ML | Refills: 0 | Status: SHIPPED | OUTPATIENT
Start: 2022-07-02

## 2022-07-02 RX ORDER — AMOXICILLIN AND CLAVULANATE POTASSIUM 875; 125 MG/1; MG/1
1 TABLET, FILM COATED ORAL 2 TIMES DAILY
Qty: 14 TABLET | Refills: 0 | Status: SHIPPED | OUTPATIENT
Start: 2022-07-02 | End: 2022-07-09

## 2022-07-02 NOTE — PATIENT INSTRUCTIONS
Go to dentist asap. To ER for worsened facial swelling, difficulty swallowing, or high fever and increased pain. Can use ibuprofen 800 mg every 8 hours as needed for pain. Ice pack to face as needed for pain/swelling.

## 2022-07-02 NOTE — PROGRESS NOTES
Subjective   Yoli Regan is a 27 y.o. female.     She has had a tootheache for two days ago and she not been able to get into her dentist due to a holiday weekend. Her gums on her left upper teeth are painful and swollen and are red and bleed easily. When she woke up her left cheek was swollen but it went down with ice. She has a pmh of root canal in that area. But to her knowledge it is intact. No recent trauma to her teeth. She has taken ibuprofen 800mg and rinsed her mouth out with salt water. She also has pain with chewing and it feels like cavity pain. She has not been on antibiotics recently.       The following portions of the patient's history were reviewed and updated as appropriate: allergies, current medications, past family history, past medical history, past social history, past surgical history, and problem list.    Review of Systems   Constitutional: Negative for fever.   HENT: Positive for dental problem and facial swelling.    Respiratory: Negative.        Objective   Physical Exam  Constitutional:       General: She is not in acute distress.     Appearance: She is well-developed. She is not diaphoretic.   Pulmonary:      Effort: Pulmonary effort is normal.   Neurological:      Mental Status: She is alert and oriented to person, place, and time.   Psychiatric:         Behavior: Behavior normal.           Assessment & Plan   Diagnoses and all orders for this visit:    1. Gingivitis (Primary)  -     chlorhexidine (Peridex) 0.12 % solution; Apply 15 mL to the mouth or throat 2 (Two) Times a Day.  Dispense: 473 mL; Refill: 0    2. Pain, dental  -     chlorhexidine (Peridex) 0.12 % solution; Apply 15 mL to the mouth or throat 2 (Two) Times a Day.  Dispense: 473 mL; Refill: 0  -     amoxicillin-clavulanate (Augmentin) 875-125 MG per tablet; Take 1 tablet by mouth 2 (Two) Times a Day for 7 days.  Dispense: 14 tablet; Refill: 0                 The use of a video visit has been reviewed with the  patient and verbal informed consent has been obtained. Myself and Yoli Regan participated in this visit. The patient is located in [place], [place]. I am located in [place], [place]. Mychart and Zoom were utilized. I spent 20 minutes in the patient's chart for this visit.   [Negative] : Genitourinary

## 2022-09-07 ENCOUNTER — OFFICE VISIT (OUTPATIENT)
Dept: PULMONOLOGY | Facility: CLINIC | Age: 27
End: 2022-09-07

## 2022-09-07 VITALS
HEIGHT: 60 IN | DIASTOLIC BLOOD PRESSURE: 64 MMHG | SYSTOLIC BLOOD PRESSURE: 98 MMHG | HEART RATE: 77 BPM | OXYGEN SATURATION: 97 % | TEMPERATURE: 97.7 F | WEIGHT: 245.4 LBS | BODY MASS INDEX: 48.18 KG/M2

## 2022-09-07 DIAGNOSIS — J45.50 SEVERE PERSISTENT ASTHMA WITHOUT COMPLICATION: Primary | ICD-10-CM

## 2022-09-07 DIAGNOSIS — Z91.09 ENVIRONMENTAL ALLERGIES: ICD-10-CM

## 2022-09-07 DIAGNOSIS — R29.818 SUSPECTED SLEEP APNEA: ICD-10-CM

## 2022-09-07 PROCEDURE — 99214 OFFICE O/P EST MOD 30 MIN: CPT | Performed by: NURSE PRACTITIONER

## 2022-09-07 NOTE — PROGRESS NOTES
"[facility] Pulmonary Follow up      Chief Complaint  Severe persistent asthma without complication  (F/U)    Subjective          Yoli Regan presents to [facility] for routine follow-up on her asthma.  Started seeing her she was having severe persistent asthma symptoms.  I did a full work-up which did include allergens as well as an IgE and eosinophil.  She also started on Trelegy.  The Trelegy has worked significantly well for her.  She has had no further exacerbations since she started on Trelegy in May.  She has not required hospitalization.  She has not required any rescue medication use throughout the day.    She still having some issues at night waking up short of breath.  Her significant other also wakes her up frequently because she is \"not breathing right\".  She does not rest well.  She awakens fatigued and groggy in the mornings.  She will use her nebulizer during these episodes and sometimes use it up to 2 times at night.        Objective     Vital Signs:   BP 98/64 (BP Location: Right arm, Patient Position: Sitting, Cuff Size: Adult)   Pulse 77   Temp 97.7 °F (36.5 °C) (Infrared)   Ht 152.4 cm (60\")   Wt 111 kg (245 lb 6.4 oz)   SpO2 97% Comment: resting, room air  BMI 47.93 kg/m²       Immunization History   Administered Date(s) Administered   • Covid-19 (Pfizer) Gray Cap 08/03/2022, 08/24/2022   • Flu Vaccine Split Quad 10/19/2016, 11/11/2020   • FluLaval/Fluzone >6mos 11/29/2018, 01/09/2020, 11/11/2020   • HPV Quadrivalent 03/24/2009, 05/29/2009   • Hep A, 2 Dose 02/24/2009   • Hepatitis B Vaccine Adult IM 11/29/2018, 01/09/2020   • Meningococcal MCV4P (Menactra) 02/24/2009   • Pneumococcal Polysaccharide (PPSV23) 10/19/2016, 11/11/2020   • Tdap 02/24/2009   • Varicella 02/24/2009       Physical Exam  Vitals reviewed.   Constitutional:       General: She is not in acute distress.     Appearance: She is well-developed. She is not " ill-appearing.   HENT:      Head: Normocephalic and atraumatic.   Eyes:      Pupils: Pupils are equal, round, and reactive to light.   Cardiovascular:      Rate and Rhythm: Normal rate and regular rhythm.      Heart sounds: No murmur heard.  Pulmonary:      Effort: Pulmonary effort is normal. No respiratory distress.      Breath sounds: Normal breath sounds. No wheezing or rales.   Abdominal:      General: Bowel sounds are normal. There is no distension.      Palpations: Abdomen is soft.   Musculoskeletal:         General: Normal range of motion.      Cervical back: Normal range of motion and neck supple.   Skin:     General: Skin is warm and dry.      Findings: No erythema.   Neurological:      Mental Status: She is alert and oriented to person, place, and time.   Psychiatric:         Behavior: Behavior normal.          Result Review :                           Assessment and Plan    Problem List Items Addressed This Visit        Allergies and Adverse Reactions    Environmental allergies       Neuro    Suspected sleep apnea    Relevant Orders    Polysomnography 4 or More Parameters       Pulmonary and Pneumonias    Severe persistent asthma without complication - Primary          Her asthma is currently well controlled on the Trelegy 200 mcg dose daily.  She has very little symptoms with activity or during the day.    We did discuss her shortness of breath and difficulty breathing's at night.  She does sleep propped up.  She has had some witnessed snoring and apnea episodes with her significant other.  I wonder if these are all related to some sleep apnea.  Like to follow-up on a sleep study for further evaluation.  She had been recommended in the past to do a sleep study but had not had that done.    Follow Up     No follow-ups on file.  Patient was given instructions and counseling regarding her condition or for health maintenance advice. Please see specific information pulled into the AVS if appropriate.     I  spent 35 minutes caring for Yoli on this date of service. This time includes time spent by me in the following activities:preparing for the visit, reviewing tests, obtaining and/or reviewing a separately obtained history, performing a medically appropriate examination and/or evaluation , counseling and educating the patient/family/caregiver, ordering medications, tests, or procedures, referring and communicating with other health care professionals , documenting information in the medical record and independently interpreting results and communicating that information with the patient/family/caregiver    WILLIE Concepcion, ACNP  [facility] Pulmonary Critical Care Medicine  Electronically signed

## 2022-09-20 DIAGNOSIS — J45.51 SEVERE PERSISTENT ASTHMA WITH ACUTE EXACERBATION: ICD-10-CM

## 2022-09-20 RX ORDER — ALBUTEROL SULFATE 2.5 MG/3ML
SOLUTION RESPIRATORY (INHALATION)
Qty: 300 ML | Refills: 4 | Status: SHIPPED | OUTPATIENT
Start: 2022-09-20 | End: 2022-09-22

## 2022-09-21 ENCOUNTER — TELEPHONE (OUTPATIENT)
Dept: INTERNAL MEDICINE | Facility: CLINIC | Age: 27
End: 2022-09-21

## 2022-09-21 DIAGNOSIS — J45.51 SEVERE PERSISTENT ASTHMA WITH ACUTE EXACERBATION: ICD-10-CM

## 2022-09-22 RX ORDER — ALBUTEROL SULFATE 2.5 MG/3ML
SOLUTION RESPIRATORY (INHALATION)
Qty: 180 ML | Refills: 4 | Status: SHIPPED | OUTPATIENT
Start: 2022-09-22

## 2022-09-22 NOTE — TELEPHONE ENCOUNTER
Albuterol neb solution Rx needs to change the quantity. Previous quantity was 180mL. I called and changed quantity to 180mL. Pharmacy verbalized understanding      The request has been partially denied. The authorization is effective for a maximum of 12 fills from 09/21/2022 to 09/20/2023, as long as the member is enrolled in their current health plan. We were asked to perform a prior authorization for coverage of a larger amount of the drug listed at the top of this letter than allowed under our quantity limit rule. We denied this request based on our GENERAL QUANTITY EXCEPTION CRITERIA. In order for this request to be approved, your provider needs to give us information showing that the larger amount is clinically necessary for your care, including a statement that the lower quantity did not or would not work for you. This request has been approved for 300mL per 30 days.

## 2022-12-11 ENCOUNTER — APPOINTMENT (OUTPATIENT)
Dept: GENERAL RADIOLOGY | Facility: HOSPITAL | Age: 27
End: 2022-12-11

## 2022-12-11 ENCOUNTER — HOSPITAL ENCOUNTER (INPATIENT)
Facility: HOSPITAL | Age: 27
LOS: 3 days | Discharge: HOME | End: 2022-12-14
Attending: STUDENT IN AN ORGANIZED HEALTH CARE EDUCATION/TRAINING PROGRAM | Admitting: PEDIATRICS

## 2022-12-11 DIAGNOSIS — R06.00 DYSPNEA, UNSPECIFIED TYPE: ICD-10-CM

## 2022-12-11 DIAGNOSIS — J96.01 ACUTE RESPIRATORY FAILURE WITH HYPOXIA: ICD-10-CM

## 2022-12-11 DIAGNOSIS — R79.89 HIGH SERUM LACTATE: ICD-10-CM

## 2022-12-11 DIAGNOSIS — J45.51 SEVERE PERSISTENT ASTHMA WITH ACUTE EXACERBATION: Primary | ICD-10-CM

## 2022-12-11 PROBLEM — J45.901 ASTHMA EXACERBATION: Status: ACTIVE | Noted: 2022-12-11

## 2022-12-11 LAB
ALBUMIN SERPL-MCNC: 4.2 G/DL (ref 3.5–5.2)
ALBUMIN/GLOB SERPL: 1.6 G/DL
ALP SERPL-CCNC: 52 U/L (ref 39–117)
ALT SERPL W P-5'-P-CCNC: 25 U/L (ref 1–33)
ANION GAP SERPL CALCULATED.3IONS-SCNC: 9 MMOL/L (ref 5–15)
AST SERPL-CCNC: 23 U/L (ref 1–32)
ATMOSPHERIC PRESS: ABNORMAL MM[HG]
B PARAPERT DNA SPEC QL NAA+PROBE: NOT DETECTED
B PERT DNA SPEC QL NAA+PROBE: NOT DETECTED
BASE EXCESS BLDV CALC-SCNC: 1.2 MMOL/L (ref -2–2)
BASOPHILS # BLD AUTO: 0.14 10*3/MM3 (ref 0–0.2)
BASOPHILS NFR BLD AUTO: 1.5 % (ref 0–1.5)
BDY SITE: ABNORMAL
BILIRUB SERPL-MCNC: 0.3 MG/DL (ref 0–1.2)
BODY TEMPERATURE: 37 C
BUN SERPL-MCNC: 13 MG/DL (ref 6–20)
BUN/CREAT SERPL: 20 (ref 7–25)
C PNEUM DNA NPH QL NAA+NON-PROBE: NOT DETECTED
CALCIUM SPEC-SCNC: 9.4 MG/DL (ref 8.6–10.5)
CHLORIDE SERPL-SCNC: 105 MMOL/L (ref 98–107)
CO2 BLDA-SCNC: 27.2 MMOL/L (ref 22–33)
CO2 SERPL-SCNC: 25 MMOL/L (ref 22–29)
COHGB MFR BLD: 0.8 %
CREAT SERPL-MCNC: 0.65 MG/DL (ref 0.57–1)
D DIMER PPP FEU-MCNC: <0.27 MCGFEU/ML (ref 0–0.5)
D-LACTATE SERPL-SCNC: 2.1 MMOL/L (ref 0.5–2)
D-LACTATE SERPL-SCNC: 2.2 MMOL/L (ref 0.5–2)
D-LACTATE SERPL-SCNC: 2.4 MMOL/L (ref 0.5–2)
D-LACTATE SERPL-SCNC: 3 MMOL/L (ref 0.5–2)
D-LACTATE SERPL-SCNC: 4.8 MMOL/L (ref 0.5–2)
DEPRECATED RDW RBC AUTO: 41.3 FL (ref 37–54)
EGFRCR SERPLBLD CKD-EPI 2021: 124.7 ML/MIN/1.73
EOSINOPHIL # BLD AUTO: 0.43 10*3/MM3 (ref 0–0.4)
EOSINOPHIL NFR BLD AUTO: 4.7 % (ref 0.3–6.2)
EPAP: 0
ERYTHROCYTE [DISTWIDTH] IN BLOOD BY AUTOMATED COUNT: 16.2 % (ref 12.3–15.4)
FLUAV SUBTYP SPEC NAA+PROBE: NOT DETECTED
FLUBV RNA ISLT QL NAA+PROBE: NOT DETECTED
GLOBULIN UR ELPH-MCNC: 2.6 GM/DL
GLUCOSE SERPL-MCNC: 104 MG/DL (ref 65–99)
HADV DNA SPEC NAA+PROBE: NOT DETECTED
HCO3 BLDV-SCNC: 25.9 MMOL/L (ref 22–28)
HCOV 229E RNA SPEC QL NAA+PROBE: NOT DETECTED
HCOV HKU1 RNA SPEC QL NAA+PROBE: NOT DETECTED
HCOV NL63 RNA SPEC QL NAA+PROBE: NOT DETECTED
HCOV OC43 RNA SPEC QL NAA+PROBE: NOT DETECTED
HCT VFR BLD AUTO: 44.8 % (ref 34–46.6)
HGB BLD-MCNC: 13.5 G/DL (ref 12–15.9)
HGB BLDA-MCNC: 13.8 G/DL (ref 14–18)
HMPV RNA NPH QL NAA+NON-PROBE: NOT DETECTED
HPIV1 RNA ISLT QL NAA+PROBE: NOT DETECTED
HPIV2 RNA SPEC QL NAA+PROBE: NOT DETECTED
HPIV3 RNA NPH QL NAA+PROBE: NOT DETECTED
HPIV4 P GENE NPH QL NAA+PROBE: NOT DETECTED
IMM GRANULOCYTES # BLD AUTO: 0.03 10*3/MM3 (ref 0–0.05)
IMM GRANULOCYTES NFR BLD AUTO: 0.3 % (ref 0–0.5)
INHALED O2 CONCENTRATION: 21 %
IPAP: 0
LYMPHOCYTES # BLD AUTO: 3.11 10*3/MM3 (ref 0.7–3.1)
LYMPHOCYTES NFR BLD AUTO: 34.2 % (ref 19.6–45.3)
M PNEUMO IGG SER IA-ACNC: NOT DETECTED
MAGNESIUM SERPL-MCNC: 1.9 MG/DL (ref 1.6–2.6)
MCH RBC QN AUTO: 22.7 PG (ref 26.6–33)
MCHC RBC AUTO-ENTMCNC: 30.1 G/DL (ref 31.5–35.7)
MCV RBC AUTO: 75.2 FL (ref 79–97)
METHGB BLD QL: 0.4 %
MODALITY: ABNORMAL
MONOCYTES # BLD AUTO: 0.72 10*3/MM3 (ref 0.1–0.9)
MONOCYTES NFR BLD AUTO: 7.9 % (ref 5–12)
NEUTROPHILS NFR BLD AUTO: 4.67 10*3/MM3 (ref 1.7–7)
NEUTROPHILS NFR BLD AUTO: 51.4 % (ref 42.7–76)
NOTE: ABNORMAL
NRBC BLD AUTO-RTO: 0 /100 WBC (ref 0–0.2)
NT-PROBNP SERPL-MCNC: 9.4 PG/ML (ref 0–450)
OXYHGB MFR BLDV: 85.3 %
PAW @ PEAK INSP FLOW SETTING VENT: 0 CMH2O
PCO2 BLDV: 40.6 MM HG (ref 41–51)
PH BLDV: 7.41 PH UNITS (ref 7.31–7.41)
PLATELET # BLD AUTO: 375 10*3/MM3 (ref 140–450)
PMV BLD AUTO: 11.2 FL (ref 6–12)
PO2 BLDV: 49.6 MM HG (ref 27–53)
POTASSIUM SERPL-SCNC: 3.5 MMOL/L (ref 3.5–5.2)
PROCALCITONIN SERPL-MCNC: 0.03 NG/ML (ref 0–0.25)
PROT SERPL-MCNC: 6.8 G/DL (ref 6–8.5)
RBC # BLD AUTO: 5.96 10*6/MM3 (ref 3.77–5.28)
RHINOVIRUS RNA SPEC NAA+PROBE: NOT DETECTED
RSV RNA NPH QL NAA+NON-PROBE: NOT DETECTED
SARS-COV-2 RNA NPH QL NAA+NON-PROBE: NOT DETECTED
SODIUM SERPL-SCNC: 139 MMOL/L (ref 136–145)
TOTAL RATE: 0 BREATHS/MINUTE
TROPONIN T SERPL-MCNC: <0.01 NG/ML (ref 0–0.03)
WBC NRBC COR # BLD: 9.1 10*3/MM3 (ref 3.4–10.8)

## 2022-12-11 PROCEDURE — 99285 EMERGENCY DEPT VISIT HI MDM: CPT

## 2022-12-11 PROCEDURE — 82805 BLOOD GASES W/O2 SATURATION: CPT

## 2022-12-11 PROCEDURE — 0202U NFCT DS 22 TRGT SARS-COV-2: CPT | Performed by: STUDENT IN AN ORGANIZED HEALTH CARE EDUCATION/TRAINING PROGRAM

## 2022-12-11 PROCEDURE — G0378 HOSPITAL OBSERVATION PER HR: HCPCS

## 2022-12-11 PROCEDURE — 94799 UNLISTED PULMONARY SVC/PX: CPT

## 2022-12-11 PROCEDURE — 25010000002 ENOXAPARIN PER 10 MG: Performed by: HOSPITALIST

## 2022-12-11 PROCEDURE — 25010000002 METHYLPREDNISOLONE PER 125 MG: Performed by: STUDENT IN AN ORGANIZED HEALTH CARE EDUCATION/TRAINING PROGRAM

## 2022-12-11 PROCEDURE — 93005 ELECTROCARDIOGRAM TRACING: CPT | Performed by: STUDENT IN AN ORGANIZED HEALTH CARE EDUCATION/TRAINING PROGRAM

## 2022-12-11 PROCEDURE — 25010000002 AZITHROMYCIN PER 500 MG: Performed by: STUDENT IN AN ORGANIZED HEALTH CARE EDUCATION/TRAINING PROGRAM

## 2022-12-11 PROCEDURE — 84484 ASSAY OF TROPONIN QUANT: CPT | Performed by: STUDENT IN AN ORGANIZED HEALTH CARE EDUCATION/TRAINING PROGRAM

## 2022-12-11 PROCEDURE — 99223 1ST HOSP IP/OBS HIGH 75: CPT | Performed by: HOSPITALIST

## 2022-12-11 PROCEDURE — 36415 COLL VENOUS BLD VENIPUNCTURE: CPT

## 2022-12-11 PROCEDURE — 87040 BLOOD CULTURE FOR BACTERIA: CPT | Performed by: STUDENT IN AN ORGANIZED HEALTH CARE EDUCATION/TRAINING PROGRAM

## 2022-12-11 PROCEDURE — 80053 COMPREHEN METABOLIC PANEL: CPT | Performed by: STUDENT IN AN ORGANIZED HEALTH CARE EDUCATION/TRAINING PROGRAM

## 2022-12-11 PROCEDURE — 83880 ASSAY OF NATRIURETIC PEPTIDE: CPT | Performed by: STUDENT IN AN ORGANIZED HEALTH CARE EDUCATION/TRAINING PROGRAM

## 2022-12-11 PROCEDURE — 84145 PROCALCITONIN (PCT): CPT | Performed by: STUDENT IN AN ORGANIZED HEALTH CARE EDUCATION/TRAINING PROGRAM

## 2022-12-11 PROCEDURE — 85025 COMPLETE CBC W/AUTO DIFF WBC: CPT | Performed by: STUDENT IN AN ORGANIZED HEALTH CARE EDUCATION/TRAINING PROGRAM

## 2022-12-11 PROCEDURE — 85379 FIBRIN DEGRADATION QUANT: CPT | Performed by: STUDENT IN AN ORGANIZED HEALTH CARE EDUCATION/TRAINING PROGRAM

## 2022-12-11 PROCEDURE — 83735 ASSAY OF MAGNESIUM: CPT | Performed by: STUDENT IN AN ORGANIZED HEALTH CARE EDUCATION/TRAINING PROGRAM

## 2022-12-11 PROCEDURE — 25010000002 MAGNESIUM SULFATE IN D5W 1G/100ML (PREMIX) 1-5 GM/100ML-% SOLUTION: Performed by: STUDENT IN AN ORGANIZED HEALTH CARE EDUCATION/TRAINING PROGRAM

## 2022-12-11 PROCEDURE — 25010000002 METHYLPREDNISOLONE PER 125 MG: Performed by: HOSPITALIST

## 2022-12-11 PROCEDURE — 83605 ASSAY OF LACTIC ACID: CPT | Performed by: STUDENT IN AN ORGANIZED HEALTH CARE EDUCATION/TRAINING PROGRAM

## 2022-12-11 PROCEDURE — 94640 AIRWAY INHALATION TREATMENT: CPT

## 2022-12-11 PROCEDURE — 71045 X-RAY EXAM CHEST 1 VIEW: CPT

## 2022-12-11 RX ORDER — ONDANSETRON 4 MG/1
4 TABLET, FILM COATED ORAL EVERY 6 HOURS PRN
Status: DISCONTINUED | OUTPATIENT
Start: 2022-12-11 | End: 2022-12-14 | Stop reason: HOSPADM

## 2022-12-11 RX ORDER — MONTELUKAST SODIUM 10 MG/1
10 TABLET ORAL NIGHTLY
Status: DISCONTINUED | OUTPATIENT
Start: 2022-12-11 | End: 2022-12-14 | Stop reason: HOSPADM

## 2022-12-11 RX ORDER — ACETAMINOPHEN 160 MG/5ML
650 SOLUTION ORAL EVERY 4 HOURS PRN
Status: DISCONTINUED | OUTPATIENT
Start: 2022-12-11 | End: 2022-12-14 | Stop reason: HOSPADM

## 2022-12-11 RX ORDER — IPRATROPIUM BROMIDE AND ALBUTEROL SULFATE 2.5; .5 MG/3ML; MG/3ML
3 SOLUTION RESPIRATORY (INHALATION) ONCE
Status: COMPLETED | OUTPATIENT
Start: 2022-12-11 | End: 2022-12-11

## 2022-12-11 RX ORDER — BUDESONIDE AND FORMOTEROL FUMARATE DIHYDRATE 160; 4.5 UG/1; UG/1
2 AEROSOL RESPIRATORY (INHALATION)
Status: DISCONTINUED | OUTPATIENT
Start: 2022-12-11 | End: 2022-12-14 | Stop reason: HOSPADM

## 2022-12-11 RX ORDER — ONDANSETRON 2 MG/ML
4 INJECTION INTRAMUSCULAR; INTRAVENOUS EVERY 6 HOURS PRN
Status: DISCONTINUED | OUTPATIENT
Start: 2022-12-11 | End: 2022-12-14 | Stop reason: HOSPADM

## 2022-12-11 RX ORDER — ACETAMINOPHEN 325 MG/1
650 TABLET ORAL EVERY 4 HOURS PRN
Status: DISCONTINUED | OUTPATIENT
Start: 2022-12-11 | End: 2022-12-14 | Stop reason: HOSPADM

## 2022-12-11 RX ORDER — SODIUM CHLORIDE 9 MG/ML
40 INJECTION, SOLUTION INTRAVENOUS AS NEEDED
Status: DISCONTINUED | OUTPATIENT
Start: 2022-12-11 | End: 2022-12-14 | Stop reason: HOSPADM

## 2022-12-11 RX ORDER — METHYLPREDNISOLONE 4 MG/1
TABLET ORAL SEE ADMIN INSTRUCTIONS
COMMUNITY
Start: 2022-12-09 | End: 2022-12-11

## 2022-12-11 RX ORDER — ACETAMINOPHEN 650 MG/1
650 SUPPOSITORY RECTAL EVERY 4 HOURS PRN
Status: DISCONTINUED | OUTPATIENT
Start: 2022-12-11 | End: 2022-12-14 | Stop reason: HOSPADM

## 2022-12-11 RX ORDER — METHYLPREDNISOLONE SODIUM SUCCINATE 125 MG/2ML
60 INJECTION, POWDER, LYOPHILIZED, FOR SOLUTION INTRAMUSCULAR; INTRAVENOUS EVERY 8 HOURS
Status: DISCONTINUED | OUTPATIENT
Start: 2022-12-11 | End: 2022-12-13

## 2022-12-11 RX ORDER — ALBUTEROL SULFATE 2.5 MG/3ML
2.5 SOLUTION RESPIRATORY (INHALATION)
Status: DISCONTINUED | OUTPATIENT
Start: 2022-12-11 | End: 2022-12-14 | Stop reason: HOSPADM

## 2022-12-11 RX ORDER — ASCORBIC ACID 500 MG
500 TABLET ORAL DAILY
Status: DISCONTINUED | OUTPATIENT
Start: 2022-12-11 | End: 2022-12-14 | Stop reason: HOSPADM

## 2022-12-11 RX ORDER — ENOXAPARIN SODIUM 100 MG/ML
40 INJECTION SUBCUTANEOUS EVERY 12 HOURS
Status: DISCONTINUED | OUTPATIENT
Start: 2022-12-11 | End: 2022-12-14 | Stop reason: HOSPADM

## 2022-12-11 RX ORDER — METHYLPREDNISOLONE SODIUM SUCCINATE 125 MG/2ML
125 INJECTION, POWDER, LYOPHILIZED, FOR SOLUTION INTRAMUSCULAR; INTRAVENOUS ONCE
Status: COMPLETED | OUTPATIENT
Start: 2022-12-11 | End: 2022-12-11

## 2022-12-11 RX ORDER — MAGNESIUM SULFATE 1 G/100ML
1 INJECTION INTRAVENOUS ONCE
Status: COMPLETED | OUTPATIENT
Start: 2022-12-11 | End: 2022-12-11

## 2022-12-11 RX ORDER — SODIUM CHLORIDE 0.9 % (FLUSH) 0.9 %
10 SYRINGE (ML) INJECTION EVERY 12 HOURS SCHEDULED
Status: DISCONTINUED | OUTPATIENT
Start: 2022-12-11 | End: 2022-12-14 | Stop reason: HOSPADM

## 2022-12-11 RX ORDER — SODIUM CHLORIDE 0.9 % (FLUSH) 0.9 %
10 SYRINGE (ML) INJECTION AS NEEDED
Status: DISCONTINUED | OUTPATIENT
Start: 2022-12-11 | End: 2022-12-14 | Stop reason: HOSPADM

## 2022-12-11 RX ORDER — PANTOPRAZOLE SODIUM 40 MG/1
40 TABLET, DELAYED RELEASE ORAL EVERY MORNING
Status: DISCONTINUED | OUTPATIENT
Start: 2022-12-11 | End: 2022-12-14 | Stop reason: HOSPADM

## 2022-12-11 RX ORDER — CHLORHEXIDINE GLUCONATE 0.12 MG/ML
15 RINSE ORAL 2 TIMES DAILY
Status: DISCONTINUED | OUTPATIENT
Start: 2022-12-11 | End: 2022-12-14 | Stop reason: HOSPADM

## 2022-12-11 RX ADMIN — BUDESONIDE AND FORMOTEROL FUMARATE DIHYDRATE 2 PUFF: 160; 4.5 AEROSOL RESPIRATORY (INHALATION) at 22:22

## 2022-12-11 RX ADMIN — IPRATROPIUM BROMIDE AND ALBUTEROL SULFATE 3 ML: .5; 3 SOLUTION RESPIRATORY (INHALATION) at 07:44

## 2022-12-11 RX ADMIN — ENOXAPARIN SODIUM 40 MG: 40 INJECTION SUBCUTANEOUS at 13:03

## 2022-12-11 RX ADMIN — METHYLPREDNISOLONE SODIUM SUCCINATE 60 MG: 125 INJECTION, POWDER, FOR SOLUTION INTRAMUSCULAR; INTRAVENOUS at 20:46

## 2022-12-11 RX ADMIN — AZITHROMYCIN 500 MG: 500 INJECTION, POWDER, LYOPHILIZED, FOR SOLUTION INTRAVENOUS at 08:48

## 2022-12-11 RX ADMIN — SODIUM CHLORIDE 1000 ML: 9 INJECTION, SOLUTION INTRAVENOUS at 15:56

## 2022-12-11 RX ADMIN — CHLORHEXIDINE GLUCONATE 15 ML: 1.2 SOLUTION ORAL at 20:46

## 2022-12-11 RX ADMIN — METHYLPREDNISOLONE SODIUM SUCCINATE 125 MG: 125 INJECTION, POWDER, FOR SOLUTION INTRAMUSCULAR; INTRAVENOUS at 05:57

## 2022-12-11 RX ADMIN — Medication 10 ML: at 20:46

## 2022-12-11 RX ADMIN — ALBUTEROL SULFATE 2.5 MG: 2.5 SOLUTION RESPIRATORY (INHALATION) at 22:21

## 2022-12-11 RX ADMIN — PANTOPRAZOLE SODIUM 40 MG: 40 TABLET, DELAYED RELEASE ORAL at 13:03

## 2022-12-11 RX ADMIN — TIOTROPIUM BROMIDE INHALATION SPRAY 2 PUFF: 3.12 SPRAY, METERED RESPIRATORY (INHALATION) at 11:50

## 2022-12-11 RX ADMIN — ALBUTEROL SULFATE 2.5 MG: 2.5 SOLUTION RESPIRATORY (INHALATION) at 11:51

## 2022-12-11 RX ADMIN — OXYCODONE HYDROCHLORIDE AND ACETAMINOPHEN 500 MG: 500 TABLET ORAL at 12:55

## 2022-12-11 RX ADMIN — Medication 10 ML: at 13:05

## 2022-12-11 RX ADMIN — MAGNESIUM SULFATE HEPTAHYDRATE 1 G: 1 INJECTION, SOLUTION INTRAVENOUS at 06:01

## 2022-12-11 RX ADMIN — ALBUTEROL SULFATE 2.5 MG: 2.5 SOLUTION RESPIRATORY (INHALATION) at 15:20

## 2022-12-11 RX ADMIN — IPRATROPIUM BROMIDE AND ALBUTEROL SULFATE 3 ML: .5; 3 SOLUTION RESPIRATORY (INHALATION) at 05:00

## 2022-12-11 RX ADMIN — BUDESONIDE AND FORMOTEROL FUMARATE DIHYDRATE 2 PUFF: 160; 4.5 AEROSOL RESPIRATORY (INHALATION) at 11:49

## 2022-12-11 RX ADMIN — METHYLPREDNISOLONE SODIUM SUCCINATE 60 MG: 125 INJECTION, POWDER, FOR SOLUTION INTRAMUSCULAR; INTRAVENOUS at 13:03

## 2022-12-11 RX ADMIN — MONTELUKAST 10 MG: 10 TABLET, FILM COATED ORAL at 20:46

## 2022-12-11 NOTE — H&P
[facility] Medicine Services  HISTORY AND PHYSICAL    Patient Name: Yoli Regan  : 1995  MRN: [id number]  Primary Care Physician: Cora Hutchinson PA-C  Date of admission: 2022      Subjective   Subjective     Chief Complaint:  SOA    HPI:  Yoli Regan is a 27 y.o. female with a past medical history of severe asthma requiring multiple hospital admissions and follows with pulmonology, GERD, obesity and seasonal allergies that presented to the ED complaining of shortness of air since Thursday. She states she woke up Thursday morning short of air and tried using her home inhalers and resting but worsened by Friday. She states she went to the ED at [place] on Friday and was given a Medrol dose pack and sent home. She states she has taken it as directed but shortness of air continued to worsen so she presented to the ED today. She states she has increased work of breathing but denies fever/chills and cough. She states she has had multiple admissions in the past for her asthma exacerbations and her mother has required intubation twice in the past for the same issue. The patient is currently stable on 2L NC but feels more comfortable due to work of breathing on HFNC. Patient will be admitted to the hospitalist service for further evaluation and treatment.    Review of Systems   Gen- No fevers, chills  CV- No chest pain, palpitations  Resp- No cough, +dyspnea  GI- No N/V/D, abd pain  All other systems reviewed and are negative.     Personal History     Past Medical History:   Diagnosis Date   • Allergic    • Asthma    • GERD (gastroesophageal reflux disease)    • Obesity 2016   • Pneumonia 2017   • Sleep apnea, obstructive     not diagnosed but drs have noticed during hospital stays             Past Surgical History:   Procedure Laterality Date   • CHOLECYSTECTOMY         Family History: her family history includes Asthma in her mother and paternal  grandmother; Coronary artery disease in her maternal grandmother; Diabetes type II in her father; Heart attack in her maternal grandmother; Hypertension in her paternal grandmother; Sleep apnea in her mother. Otherwise pertinent FHx was reviewed and unremarkable.     Social History:  reports that she has never smoked. She has never used smokeless tobacco. She reports that she does not drink alcohol and does not use drugs.  Social History     Social History Narrative   • Not on file       Medications:  Available home medication information reviewed.  Medications Prior to Admission   Medication Sig Dispense Refill Last Dose   • acetaminophen (TYLENOL) 325 MG tablet Take 2 tablets by mouth Every 4 (Four) Hours As Needed for Mild Pain .      • albuterol (PROVENTIL) (2.5 MG/3ML) 0.083% nebulizer solution Inhale 1 vial VIA nebulizer every 4 hours as needed for wheezing 180 mL 4    • albuterol sulfate  (90 Base) MCG/ACT inhaler Inhale 2 puffs Every 4 (Four) Hours As Needed for Wheezing. 18 g 5    • Ascorbic Acid (Vitamin C) 500 MG capsule Take 1 capsule by mouth Daily.      • chlorhexidine (Peridex) 0.12 % solution Apply 15 mL to the mouth or throat 2 (Two) Times a Day. 473 mL 0    • Fluticasone-Umeclidin-Vilant (TRELEGY) 200-62.5-25 MCG/INH inhaler Inhale 1 puff Daily. 1 each 5    • ipratropium-albuterol (DUO-NEB) 0.5-2.5 mg/3 ml nebulizer Take 3 mL by nebulization Every 4 (Four) Hours. 360 mL 0    • montelukast (SINGULAIR) 10 MG tablet Take 1 tablet by mouth Every Night. 30 tablet 5    • omeprazole OTC (PriLOSEC OTC) 20 MG EC tablet Take 1 tablet by mouth Daily As Needed (reflux symptoms). Take ONLY WHEN ON STEROIDS 30 tablet 0        No Known Allergies    Objective   Objective     Vital Signs:   Temp:  [98 °F (36.7 °C)-98.3 °F (36.8 °C)] 98.3 °F (36.8 °C)  Heart Rate:  [] 96  Resp:  [18-34] 18  BP: (105-142)/() 105/71  Flow (L/min):  [40] 40       Physical Exam   Constitutional: Awake, alert  Eyes:  PERRLA, sclerae anicteric, no conjunctival injection  HENT: NCAT, mucous membranes moist  Neck: Supple, no thyromegaly, no lymphadenopathy, trachea midline  Respiratory: course and decreased breathing sounds with mild wheezing in b/l bases, nonlabored breathing on HFNC  Cardiovascular: RRR, no murmurs, rubs, or gallops, palpable pedal pulses bilaterally  Gastrointestinal: Positive bowel sounds, soft, nontender, nondistended  Musculoskeletal: No bilateral ankle edema, no clubbing or cyanosis to extremities  Psychiatric: Appropriate affect, cooperative  Neurologic: Oriented x 3, strength symmetric in all extremities, Cranial Nerves grossly intact to confrontation, speech clear  Skin: No rashes    Result Review:  I have personally reviewed the results from the time of this admission to 12/11/2022 10:24 EST and agree with these findings:  [x]  Laboratory list / accordion  []  Microbiology  [x]  Radiology  []  EKG/Telemetry   []  Cardiology/Vascular   []  Pathology  []  Old records  []  Other:    LAB RESULTS:      Lab 12/11/22  0825 12/11/22  0549   WBC  --  9.10   HEMOGLOBIN  --  13.5   HEMATOCRIT  --  44.8   PLATELETS  --  375   NEUTROS ABS  --  4.67   IMMATURE GRANS (ABS)  --  0.03   LYMPHS ABS  --  3.11*   MONOS ABS  --  0.72   EOS ABS  --  0.43*   MCV  --  75.2*   PROCALCITONIN  --  0.03   LACTATE 2.2*  --    D DIMER QUANT  --  <0.27         Lab 12/11/22  0549   SODIUM 139   POTASSIUM 3.5   CHLORIDE 105   CO2 25.0   ANION GAP 9.0   BUN 13   CREATININE 0.65   EGFR 124.7   GLUCOSE 104*   CALCIUM 9.4   MAGNESIUM 1.9         Lab 12/11/22  0549   TOTAL PROTEIN 6.8   ALBUMIN 4.20   GLOBULIN 2.6   ALT (SGPT) 25   AST (SGOT) 23   BILIRUBIN 0.3   ALK PHOS 52         Lab 12/11/22  0549   PROBNP 9.4   TROPONIN T <0.010                 Lab 12/11/22  0557   FIO2 21   CARBOXYHEMOGLOBIN (VENOUS) 0.8         Microbiology Results (last 10 days)     Procedure Component Value - Date/Time    COVID PRE-OP / PRE-PROCEDURE SCREENING ORDER  (NO ISOLATION) - Swab, Nasopharynx [[id number]]  (Normal) Collected: 12/11/22 0553    Lab Status: Final result Specimen: Swab from Nasopharynx Updated: 12/11/22 0659    Narrative:      The following orders were created for panel order COVID PRE-OP / PRE-PROCEDURE SCREENING ORDER (NO ISOLATION) - Swab, Nasopharynx.  Procedure                               Abnormality         Status                     ---------                               -----------         ------                     Respiratory Panel PCR w/...[[id number]]  Normal              Final result                 Please view results for these tests on the individual orders.    Respiratory Panel PCR w/COVID-19(SARS-CoV-2) CYNTHIA/STEPHANE/DESTINEY/PAD/COR/MAD/CARRIE In-House, NP Swab in UTM/VTM, 3-4 HR TAT - Swab, Nasopharynx [[id number]]  (Normal) Collected: 12/11/22 0553    Lab Status: Final result Specimen: Swab from Nasopharynx Updated: 12/11/22 0659     ADENOVIRUS, PCR Not Detected     Coronavirus 229E Not Detected     Coronavirus HKU1 Not Detected     Coronavirus NL63 Not Detected     Coronavirus OC43 Not Detected     COVID19 Not Detected     Human Metapneumovirus Not Detected     Human Rhinovirus/Enterovirus Not Detected     Influenza A PCR Not Detected     Influenza B PCR Not Detected     Parainfluenza Virus 1 Not Detected     Parainfluenza Virus 2 Not Detected     Parainfluenza Virus 3 Not Detected     Parainfluenza Virus 4 Not Detected     RSV, PCR Not Detected     Bordetella pertussis pcr Not Detected     Bordetella parapertussis PCR Not Detected     Chlamydophila pneumoniae PCR Not Detected     Mycoplasma pneumo by PCR Not Detected    Narrative:      In the setting of a positive respiratory panel with a viral infection PLUS a negative procalcitonin without other underlying concern for bacterial infection, consider observing off antibiotics or discontinuation of antibiotics and continue supportive care. If the respiratory panel is positive for atypical bacterial  infection (Bordetella pertussis, Chlamydophila pneumoniae, or Mycoplasma pneumoniae), consider antibiotic de-escalation to target atypical bacterial infection.          XR Chest 1 View    Result Date: 12/11/2022  EXAMINATION: Chest one DATE: 12/11/2022. COMPARISON: 4/21/2022. CLINICAL HISTORY: Persistent asthma exacerbation.     Impression: There is likely some bronchial wall thickening related to inflammatory airway changes. There is no focal consolidation otherwise seen. The cardiomediastinal silhouette and the pulmonary vessels are within normal limits. Electronically signed by:  Joaquin Peterson D.O.  12/11/2022 4:21 AM Mountain Time          Assessment & Plan   Assessment & Plan     Active Hospital Problems    Diagnosis  POA   • Asthma exacerbation [J45.901]  Yes   • GERD (gastroesophageal reflux disease) [K21.9]  Yes   • Morbid obesity with BMI of 40.0-44.9, adult ([facility]) [E66.01, Z68.41]  Not Applicable   • Acute respiratory failure with hypoxia ([facility]) [J96.01]  Yes     Asthma Exacerbation  Respiratory Failure  - patient currently on HFNC due to work of breathing  - was satting well on 2L NC but felt like she was working hard to breathe  - has had multiple admissions for asthma exacerbations and follows with pulmonology   - Solumedrol 60mg IV Q8H  - Q4H duonebs  - Respiratory PCR/Covid/Flu negative  - CXR Stable    GERD  - continue PPI    DVT prophylaxis:  Lovenox SQ      CODE STATUS:  Full/CPR  Code Status and Medical Interventions:   Ordered at: 12/11/22 0953     Level Of Support Discussed With:    Patient     Code Status (Patient has no pulse and is not breathing):    CPR (Attempt to Resuscitate)     Medical Interventions (Patient has pulse or is breathing):    Full Support         Carmencita Kc DO  12/11/22

## 2022-12-12 PROBLEM — J45.51 SEVERE PERSISTENT ASTHMA WITH ACUTE EXACERBATION: Status: ACTIVE | Noted: 2022-12-12

## 2022-12-12 LAB
ALBUMIN SERPL-MCNC: 3.9 G/DL (ref 3.5–5.2)
ALBUMIN/GLOB SERPL: 1.7 G/DL
ALP SERPL-CCNC: 55 U/L (ref 39–117)
ALT SERPL W P-5'-P-CCNC: 22 U/L (ref 1–33)
ANION GAP SERPL CALCULATED.3IONS-SCNC: 9 MMOL/L (ref 5–15)
AST SERPL-CCNC: 14 U/L (ref 1–32)
BILIRUB SERPL-MCNC: 0.6 MG/DL (ref 0–1.2)
BUN SERPL-MCNC: 10 MG/DL (ref 6–20)
BUN/CREAT SERPL: 21.7 (ref 7–25)
CALCIUM SPEC-SCNC: 9 MG/DL (ref 8.6–10.5)
CHLORIDE SERPL-SCNC: 109 MMOL/L (ref 98–107)
CO2 SERPL-SCNC: 22 MMOL/L (ref 22–29)
CREAT SERPL-MCNC: 0.46 MG/DL (ref 0.57–1)
D-LACTATE SERPL-SCNC: 1.1 MMOL/L (ref 0.5–2)
DEPRECATED RDW RBC AUTO: 42.1 FL (ref 37–54)
EGFRCR SERPLBLD CKD-EPI 2021: 135.5 ML/MIN/1.73
ERYTHROCYTE [DISTWIDTH] IN BLOOD BY AUTOMATED COUNT: 15.6 % (ref 12.3–15.4)
GLOBULIN UR ELPH-MCNC: 2.3 GM/DL
GLUCOSE SERPL-MCNC: 159 MG/DL (ref 65–99)
HBA1C MFR BLD: 5.2 % (ref 4.8–5.6)
HCT VFR BLD AUTO: 42.5 % (ref 34–46.6)
HGB BLD-MCNC: 12.8 G/DL (ref 12–15.9)
MCH RBC QN AUTO: 22.9 PG (ref 26.6–33)
MCHC RBC AUTO-ENTMCNC: 30.1 G/DL (ref 31.5–35.7)
MCV RBC AUTO: 76 FL (ref 79–97)
PLATELET # BLD AUTO: 394 10*3/MM3 (ref 140–450)
PMV BLD AUTO: 11.5 FL (ref 6–12)
POTASSIUM SERPL-SCNC: 4.7 MMOL/L (ref 3.5–5.2)
PROT SERPL-MCNC: 6.2 G/DL (ref 6–8.5)
QT INTERVAL: 356 MS
QTC INTERVAL: 440 MS
RBC # BLD AUTO: 5.59 10*6/MM3 (ref 3.77–5.28)
SODIUM SERPL-SCNC: 140 MMOL/L (ref 136–145)
WBC NRBC COR # BLD: 12.64 10*3/MM3 (ref 3.4–10.8)

## 2022-12-12 PROCEDURE — 99232 SBSQ HOSP IP/OBS MODERATE 35: CPT | Performed by: PEDIATRICS

## 2022-12-12 PROCEDURE — 25010000002 ENOXAPARIN PER 10 MG: Performed by: HOSPITALIST

## 2022-12-12 PROCEDURE — 94761 N-INVAS EAR/PLS OXIMETRY MLT: CPT

## 2022-12-12 PROCEDURE — 83036 HEMOGLOBIN GLYCOSYLATED A1C: CPT | Performed by: PEDIATRICS

## 2022-12-12 PROCEDURE — 94799 UNLISTED PULMONARY SVC/PX: CPT

## 2022-12-12 PROCEDURE — 25010000002 METHYLPREDNISOLONE PER 125 MG: Performed by: HOSPITALIST

## 2022-12-12 PROCEDURE — 85027 COMPLETE CBC AUTOMATED: CPT | Performed by: HOSPITALIST

## 2022-12-12 PROCEDURE — 83605 ASSAY OF LACTIC ACID: CPT | Performed by: STUDENT IN AN ORGANIZED HEALTH CARE EDUCATION/TRAINING PROGRAM

## 2022-12-12 PROCEDURE — 80053 COMPREHEN METABOLIC PANEL: CPT | Performed by: HOSPITALIST

## 2022-12-12 RX ADMIN — ALBUTEROL SULFATE 2.5 MG: 2.5 SOLUTION RESPIRATORY (INHALATION) at 11:57

## 2022-12-12 RX ADMIN — BUDESONIDE AND FORMOTEROL FUMARATE DIHYDRATE 2 PUFF: 160; 4.5 AEROSOL RESPIRATORY (INHALATION) at 21:29

## 2022-12-12 RX ADMIN — METHYLPREDNISOLONE SODIUM SUCCINATE 60 MG: 125 INJECTION, POWDER, FOR SOLUTION INTRAMUSCULAR; INTRAVENOUS at 02:29

## 2022-12-12 RX ADMIN — ALBUTEROL SULFATE 2.5 MG: 2.5 SOLUTION RESPIRATORY (INHALATION) at 08:13

## 2022-12-12 RX ADMIN — Medication 10 ML: at 08:51

## 2022-12-12 RX ADMIN — METHYLPREDNISOLONE SODIUM SUCCINATE 60 MG: 125 INJECTION, POWDER, FOR SOLUTION INTRAMUSCULAR; INTRAVENOUS at 20:29

## 2022-12-12 RX ADMIN — Medication 10 ML: at 20:29

## 2022-12-12 RX ADMIN — SODIUM CHLORIDE 1000 ML: 9 INJECTION, SOLUTION INTRAVENOUS at 00:17

## 2022-12-12 RX ADMIN — ALBUTEROL SULFATE 2.5 MG: 2.5 SOLUTION RESPIRATORY (INHALATION) at 02:43

## 2022-12-12 RX ADMIN — PANTOPRAZOLE SODIUM 40 MG: 40 TABLET, DELAYED RELEASE ORAL at 06:13

## 2022-12-12 RX ADMIN — TIOTROPIUM BROMIDE INHALATION SPRAY 2 PUFF: 3.12 SPRAY, METERED RESPIRATORY (INHALATION) at 08:13

## 2022-12-12 RX ADMIN — ENOXAPARIN SODIUM 40 MG: 40 INJECTION SUBCUTANEOUS at 00:17

## 2022-12-12 RX ADMIN — BUDESONIDE AND FORMOTEROL FUMARATE DIHYDRATE 2 PUFF: 160; 4.5 AEROSOL RESPIRATORY (INHALATION) at 08:13

## 2022-12-12 RX ADMIN — ALBUTEROL SULFATE 2.5 MG: 2.5 SOLUTION RESPIRATORY (INHALATION) at 19:26

## 2022-12-12 RX ADMIN — ALBUTEROL SULFATE 2.5 MG: 2.5 SOLUTION RESPIRATORY (INHALATION) at 23:12

## 2022-12-12 RX ADMIN — CHLORHEXIDINE GLUCONATE 15 ML: 1.2 SOLUTION ORAL at 20:29

## 2022-12-12 RX ADMIN — ALBUTEROL SULFATE 2.5 MG: 2.5 SOLUTION RESPIRATORY (INHALATION) at 16:09

## 2022-12-12 RX ADMIN — CHLORHEXIDINE GLUCONATE 15 ML: 1.2 SOLUTION ORAL at 12:18

## 2022-12-12 RX ADMIN — OXYCODONE HYDROCHLORIDE AND ACETAMINOPHEN 500 MG: 500 TABLET ORAL at 08:50

## 2022-12-12 RX ADMIN — ENOXAPARIN SODIUM 40 MG: 40 INJECTION SUBCUTANEOUS at 12:19

## 2022-12-12 RX ADMIN — METHYLPREDNISOLONE SODIUM SUCCINATE 60 MG: 125 INJECTION, POWDER, FOR SOLUTION INTRAMUSCULAR; INTRAVENOUS at 12:19

## 2022-12-12 RX ADMIN — MONTELUKAST 10 MG: 10 TABLET, FILM COATED ORAL at 20:29

## 2022-12-12 NOTE — CASE MANAGEMENT/SOCIAL WORK
Continued Stay Note   [facility]     Patient Name: Yoli Regan  MRN: [id number]  Today's Date: 12/12/2022    Admit Date: 12/11/2022    Plan: home   Discharge Plan     Row Name 12/12/22 0930       Plan    Plan home    Patient/Family in Agreement with Plan yes    Plan Comments Pt lives with her significant other and daughter. She reports she is independent with ADLs and owns a neb machine. She is followed by her PCP and has drug coverage. Plan for discharge is to return home. CM to follow for any discharge needs    Final Discharge Disposition Code 01 - home or self-care               Discharge Codes    No documentation.               Expected Discharge Date and Time     Expected Discharge Date Expected Discharge Time    Dec 14, 2022             Chichi Diaz RN

## 2022-12-12 NOTE — PAYOR COMM NOTE
"Hospital Admission  ID# [id number]    Utilization Review  Phone [phone]  Fax [phone]    [facility]  [address] [place] [address]    Yoli Regan (27 y.o. Female)     Date of Birth   1995    Social Security Number       Address   [address] [address]    Home Phone   [phone]    MRN   [id number]       Gnosticist   Alevism    Marital Status   Single                            Admission Date   12/11/22    Admission Type   Emergency    Admitting Provider   Rimma Escamilla MD    Attending Provider   Rimma Escamilla MD    Department, Room/Bed   [facility] 3H, S379/1       Discharge Date       Discharge Disposition       Discharge Destination                               Attending Provider: Rimma Escamilla MD    Allergies: No Known Allergies    Isolation: None   Infection: None   Code Status: CPR    Ht: 152.4 cm (60\")   Wt: 113 kg (250 lb)    Admission Cmt: None   Principal Problem: Severe persistent asthma with acute exacerbation [J45.51]                 Active Insurance as of 12/11/2022     Primary Coverage     Payor Plan Insurance Group Employer/Plan Group    [facility] MEDICAID      Payor Plan Address Payor Plan Phone Number Payor Plan Fax Number Effective Dates    [address] [address] [phone]  6/7/2019 - None Entered    [facility] [id number]       Subscriber Name Subscriber Birth Date Member ID       YOLI REGAN 1995 [id number]                 Emergency Contacts      (Rel.) Home Phone Work Phone Mobile Phone    TOÑO HERNANDEZ (Significant Other) [phone] -- [phone]            [place] [id number] Tax ID [id number]  Insurance Information                [facility] MEDICAID Phone: [phone]    Subscriber: Yoli Regan Subscriber#: [id number]    Group#: -- Precert#: --             History & Physical      Carmencita Kc DO " at [address] [place] [address] Medicine Services  HISTORY AND PHYSICAL    Patient Name: Yoli Regan  : 1995  MRN: [id number]  Primary Care Physician: Cora Hutchinson PA-C  Date of admission: 2022      Subjective    Subjective     Chief Complaint:  SOA    HPI:  Yoli Regan is a 27 y.o. female with a past medical history of severe asthma requiring multiple hospital admissions and follows with pulmonology, GERD, obesity and seasonal allergies that presented to the ED complaining of shortness of air since Thursday. She states she woke up Thursday morning short of air and tried using her home inhalers and resting but worsened by Friday. She states she went to the ED at [place] on Friday and was given a Medrol dose pack and sent home. She states she has taken it as directed but shortness of air continued to worsen so she presented to the ED today. She states she has increased work of breathing but denies fever/chills and cough. She states she has had multiple admissions in the past for her asthma exacerbations and her mother has required intubation twice in the past for the same issue. The patient is currently stable on 2L NC but feels more comfortable due to work of breathing on HFNC. Patient will be admitted to the hospitalist service for further evaluation and treatment.    Review of Systems   Gen- No fevers, chills  CV- No chest pain, palpitations  Resp- No cough, +dyspnea  GI- No N/V/D, abd pain  All other systems reviewed and are negative.     Personal History     Past Medical History:   Diagnosis Date   • Allergic    • Asthma    • GERD (gastroesophageal reflux disease)    • Obesity 2016   • Pneumonia 2017   • Sleep apnea, obstructive     not diagnosed but drs have noticed during hospital stays             Past Surgical History:   Procedure Laterality Date   • CHOLECYSTECTOMY         Family History: her family history includes Asthma in her  mother and paternal grandmother; Coronary artery disease in her maternal grandmother; Diabetes type II in her father; Heart attack in her maternal grandmother; Hypertension in her paternal grandmother; Sleep apnea in her mother. Otherwise pertinent FHx was reviewed and unremarkable.     Social History:  reports that she has never smoked. She has never used smokeless tobacco. She reports that she does not drink alcohol and does not use drugs.  Social History     Social History Narrative   • Not on file       Medications:  Available home medication information reviewed.  Medications Prior to Admission   Medication Sig Dispense Refill Last Dose   • acetaminophen (TYLENOL) 325 MG tablet Take 2 tablets by mouth Every 4 (Four) Hours As Needed for Mild Pain .      • albuterol (PROVENTIL) (2.5 MG/3ML) 0.083% nebulizer solution Inhale 1 vial VIA nebulizer every 4 hours as needed for wheezing 180 mL 4    • albuterol sulfate  (90 Base) MCG/ACT inhaler Inhale 2 puffs Every 4 (Four) Hours As Needed for Wheezing. 18 g 5    • Ascorbic Acid (Vitamin C) 500 MG capsule Take 1 capsule by mouth Daily.      • chlorhexidine (Peridex) 0.12 % solution Apply 15 mL to the mouth or throat 2 (Two) Times a Day. 473 mL 0    • Fluticasone-Umeclidin-Vilant (TRELEGY) 200-62.5-25 MCG/INH inhaler Inhale 1 puff Daily. 1 each 5    • ipratropium-albuterol (DUO-NEB) 0.5-2.5 mg/3 ml nebulizer Take 3 mL by nebulization Every 4 (Four) Hours. 360 mL 0    • montelukast (SINGULAIR) 10 MG tablet Take 1 tablet by mouth Every Night. 30 tablet 5    • omeprazole OTC (PriLOSEC OTC) 20 MG EC tablet Take 1 tablet by mouth Daily As Needed (reflux symptoms). Take ONLY WHEN ON STEROIDS 30 tablet 0        No Known Allergies    Objective    Objective     Vital Signs:   Temp:  [98 °F (36.7 °C)-98.3 °F (36.8 °C)] 98.3 °F (36.8 °C)  Heart Rate:  [] 96  Resp:  [18-34] 18  BP: (105-142)/() 105/71  Flow (L/min):  [40] 40       Physical Exam   Constitutional:  Awake, alert  Eyes: PERRLA, sclerae anicteric, no conjunctival injection  HENT: NCAT, mucous membranes moist  Neck: Supple, no thyromegaly, no lymphadenopathy, trachea midline  Respiratory: course and decreased breathing sounds with mild wheezing in b/l bases, nonlabored breathing on HFNC  Cardiovascular: RRR, no murmurs, rubs, or gallops, palpable pedal pulses bilaterally  Gastrointestinal: Positive bowel sounds, soft, nontender, nondistended  Musculoskeletal: No bilateral ankle edema, no clubbing or cyanosis to extremities  Psychiatric: Appropriate affect, cooperative  Neurologic: Oriented x 3, strength symmetric in all extremities, Cranial Nerves grossly intact to confrontation, speech clear  Skin: No rashes    Result Review:  I have personally reviewed the results from the time of this admission to 12/11/2022 10:24 EST and agree with these findings:  [x]  Laboratory list / accordion  []  Microbiology  [x]  Radiology  []  EKG/Telemetry   []  Cardiology/Vascular   []  Pathology  []  Old records  []  Other:    LAB RESULTS:      Lab 12/11/22  0825 12/11/22  0549   WBC  --  9.10   HEMOGLOBIN  --  13.5   HEMATOCRIT  --  44.8   PLATELETS  --  375   NEUTROS ABS  --  4.67   IMMATURE GRANS (ABS)  --  0.03   LYMPHS ABS  --  3.11*   MONOS ABS  --  0.72   EOS ABS  --  0.43*   MCV  --  75.2*   PROCALCITONIN  --  0.03   LACTATE 2.2*  --    D DIMER QUANT  --  <0.27         Lab 12/11/22  0549   SODIUM 139   POTASSIUM 3.5   CHLORIDE 105   CO2 25.0   ANION GAP 9.0   BUN 13   CREATININE 0.65   EGFR 124.7   GLUCOSE 104*   CALCIUM 9.4   MAGNESIUM 1.9         Lab 12/11/22  0549   TOTAL PROTEIN 6.8   ALBUMIN 4.20   GLOBULIN 2.6   ALT (SGPT) 25   AST (SGOT) 23   BILIRUBIN 0.3   ALK PHOS 52         Lab 12/11/22  0549   PROBNP 9.4   TROPONIN T <0.010                 Lab 12/11/22  0557   FIO2 21   CARBOXYHEMOGLOBIN (VENOUS) 0.8         Microbiology Results (last 10 days)     Procedure Component Value - Date/Time    COVID PRE-OP /  PRE-PROCEDURE SCREENING ORDER (NO ISOLATION) - Swab, Nasopharynx [[id number]]  (Normal) Collected: 12/11/22 0553    Lab Status: Final result Specimen: Swab from Nasopharynx Updated: 12/11/22 0659    Narrative:      The following orders were created for panel order COVID PRE-OP / PRE-PROCEDURE SCREENING ORDER (NO ISOLATION) - Swab, Nasopharynx.  Procedure                               Abnormality         Status                     ---------                               -----------         ------                     Respiratory Panel PCR w/...[[id number]]  Normal              Final result                 Please view results for these tests on the individual orders.    Respiratory Panel PCR w/COVID-19(SARS-CoV-2) CYNTHIA/STEPHANE/DESTINEY/PAD/COR/MAD/CARRIE In-House, NP Swab in UTM/VTM, 3-4 HR TAT - Swab, Nasopharynx [[id number]]  (Normal) Collected: 12/11/22 0553    Lab Status: Final result Specimen: Swab from Nasopharynx Updated: 12/11/22 0659     ADENOVIRUS, PCR Not Detected     Coronavirus 229E Not Detected     Coronavirus HKU1 Not Detected     Coronavirus NL63 Not Detected     Coronavirus OC43 Not Detected     COVID19 Not Detected     Human Metapneumovirus Not Detected     Human Rhinovirus/Enterovirus Not Detected     Influenza A PCR Not Detected     Influenza B PCR Not Detected     Parainfluenza Virus 1 Not Detected     Parainfluenza Virus 2 Not Detected     Parainfluenza Virus 3 Not Detected     Parainfluenza Virus 4 Not Detected     RSV, PCR Not Detected     Bordetella pertussis pcr Not Detected     Bordetella parapertussis PCR Not Detected     Chlamydophila pneumoniae PCR Not Detected     Mycoplasma pneumo by PCR Not Detected    Narrative:      In the setting of a positive respiratory panel with a viral infection PLUS a negative procalcitonin without other underlying concern for bacterial infection, consider observing off antibiotics or discontinuation of antibiotics and continue supportive care. If the respiratory panel is  positive for atypical bacterial infection (Bordetella pertussis, Chlamydophila pneumoniae, or Mycoplasma pneumoniae), consider antibiotic de-escalation to target atypical bacterial infection.          XR Chest 1 View    Result Date: 12/11/2022  EXAMINATION: Chest one DATE: 12/11/2022. COMPARISON: 4/21/2022. CLINICAL HISTORY: Persistent asthma exacerbation.     Impression: There is likely some bronchial wall thickening related to inflammatory airway changes. There is no focal consolidation otherwise seen. The cardiomediastinal silhouette and the pulmonary vessels are within normal limits. Electronically signed by:  Joaquin Peterson D.O.  12/11/2022 4:21 AM Mountain Time          Assessment & Plan   Assessment & Plan     Active Hospital Problems    Diagnosis  POA   • Asthma exacerbation [J45.901]  Yes   • GERD (gastroesophageal reflux disease) [K21.9]  Yes   • Morbid obesity with BMI of 40.0-44.9, adult ([facility]) [E66.01, Z68.41]  Not Applicable   • Acute respiratory failure with hypoxia ([facility]) [J96.01]  Yes     Asthma Exacerbation  Respiratory Failure  - patient currently on HFNC due to work of breathing  - was satting well on 2L NC but felt like she was working hard to breathe  - has had multiple admissions for asthma exacerbations and follows with pulmonology   - Solumedrol 60mg IV Q8H  - Q4H duonebs  - Respiratory PCR/Covid/Flu negative  - CXR Stable    GERD  - continue PPI    DVT prophylaxis:  Lovenox SQ      CODE STATUS:  Full/CPR  Code Status and Medical Interventions:   Ordered at: 12/11/22 0957     Level Of Support Discussed With:    Patient     Code Status (Patient has no pulse and is not breathing):    CPR (Attempt to Resuscitate)     Medical Interventions (Patient has pulse or is breathing):    Full Support         Carmencita Kc DO  12/11/22      Electronically signed by Carmencita Kc DO at 12/11/22 1030          Emergency Department Notes      Curt Hinojosa MD at 12/11/22 1951           EMERGENCY  DEPARTMENT ENCOUNTER    Pt Name: Yoli Regan  MRN: [id number]  Pt :   1995  Room Number:  S379/1  Date of encounter:  2022  PCP: Cora Hutchinson PA-C  ED Provider: Curt Hinojosa MD    Historian: Patient      HPI:  Chief Complaint: Dyspnea, asthma        Context: Yoli Regan is a 27-year-old female with history of severe persistent asthma who presents the ED for evaluation of difficulty breathing.  She says this has been going on for several days now on Thursday she went to [facility] where she felt better after being treated with Solu-Medrol, magnesium, and nebulizer treatments.  She was discharged on 40 mg of oral prednisone daily but says in the past she has needed higher doses than that.  Her symptoms have persisted since going home and she was going to see her pulmonologist on Monday but this evening she woke up feeling extremely short of breath after taking several albuterol treatments prior to going to sleep.  She continues to feel short of breath but says it is somewhat better than when she got here.  She has not appreciated any leg swelling or immobilization.  She feels like there may be an underlying respiratory infection.  Has not appreciated any fevers or systemic symptoms.  Continues to feel somewhat short of breath but denies pain.  No other complaints at this time.       PAST MEDICAL HISTORY  Past Medical History:   Diagnosis Date   • Allergic    • Asthma    • GERD (gastroesophageal reflux disease)    • Obesity 2016   • Pneumonia 2017   • Sleep apnea, obstructive     not diagnosed but drs have noticed during hospital stays         PAST SURGICAL HISTORY  Past Surgical History:   Procedure Laterality Date   • CHOLECYSTECTOMY           FAMILY HISTORY  Family History   Problem Relation Age of Onset   • Asthma Mother    • Sleep apnea Mother    • Diabetes type II Father    • Coronary artery disease Maternal Grandmother    • Heart attack Maternal  Grandmother    • Asthma Paternal Grandmother    • Hypertension Paternal Grandmother          SOCIAL HISTORY  Social History     Socioeconomic History   • Marital status: Single   Tobacco Use   • Smoking status: Never   • Smokeless tobacco: Never   Substance and Sexual Activity   • Alcohol use: No   • Drug use: No   • Sexual activity: Yes     Partners: Male     Birth control/protection: None         ALLERGIES  Patient has no known allergies.        REVIEW OF SYSTEMS  Review of Systems       All systems reviewed and negative except for those discussed in HPI.       PHYSICAL EXAM    I have reviewed the triage vital signs and nursing notes.    ED Triage Vitals [12/11/22 0318]   Temp Heart Rate Resp BP SpO2   98 °F (36.7 °C) 118 (!) 34 (!) 142/111 94 %      Temp src Heart Rate Source Patient Position BP Location FiO2 (%)   Oral Monitor Sitting Left arm --       Physical Exam  GENERAL:   Appears increased work of breathing but without acute distress  HENT: Nares patent.  EYES: No scleral icterus.  CV: Regular rhythm, regular rate.  RESPIRATORY: Mild tachypnea, decreased air movement, and expiratory wheezes in all lung fields  ABDOMEN: Soft, nontender  MUSCULOSKELETAL: No deformities.   NEURO: Alert, moves all extremities, follows commands.  SKIN: Warm, dry, no rash visualized.        LAB RESULTS  Recent Results (from the past 24 hour(s))   Comprehensive Metabolic Panel    Collection Time: 12/11/22  5:49 AM    Specimen: Blood   Result Value Ref Range    Glucose 104 (H) 65 - 99 mg/dL    BUN 13 6 - 20 mg/dL    Creatinine 0.65 0.57 - 1.00 mg/dL    Sodium 139 136 - 145 mmol/L    Potassium 3.5 3.5 - 5.2 mmol/L    Chloride 105 98 - 107 mmol/L    CO2 25.0 22.0 - 29.0 mmol/L    Calcium 9.4 8.6 - 10.5 mg/dL    Total Protein 6.8 6.0 - 8.5 g/dL    Albumin 4.20 3.50 - 5.20 g/dL    ALT (SGPT) 25 1 - 33 U/L    AST (SGOT) 23 1 - 32 U/L    Alkaline Phosphatase 52 39 - 117 U/L    Total Bilirubin 0.3 0.0 - 1.2 mg/dL    Globulin 2.6 gm/dL     A/G Ratio 1.6 g/dL    BUN/Creatinine Ratio 20.0 7.0 - 25.0    Anion Gap 9.0 5.0 - 15.0 mmol/L    eGFR 124.7 >60.0 mL/min/1.73   D-dimer, Quantitative    Collection Time: 12/11/22  5:49 AM    Specimen: Blood   Result Value Ref Range    D-Dimer, Quantitative <0.27 0.00 - 0.50 MCGFEU/mL   BNP    Collection Time: 12/11/22  5:49 AM    Specimen: Blood   Result Value Ref Range    proBNP 9.4 0.0 - 450.0 pg/mL   Troponin    Collection Time: 12/11/22  5:49 AM    Specimen: Blood   Result Value Ref Range    Troponin T <0.010 0.000 - 0.030 ng/mL   Procalcitonin    Collection Time: 12/11/22  5:49 AM    Specimen: Blood   Result Value Ref Range    Procalcitonin 0.03 0.00 - 0.25 ng/mL   Magnesium    Collection Time: 12/11/22  5:49 AM    Specimen: Blood   Result Value Ref Range    Magnesium 1.9 1.6 - 2.6 mg/dL   CBC Auto Differential    Collection Time: 12/11/22  5:49 AM    Specimen: Blood   Result Value Ref Range    WBC 9.10 3.40 - 10.80 10*3/mm3    RBC 5.96 (H) 3.77 - 5.28 10*6/mm3    Hemoglobin 13.5 12.0 - 15.9 g/dL    Hematocrit 44.8 34.0 - 46.6 %    MCV 75.2 (L) 79.0 - 97.0 fL    MCH 22.7 (L) 26.6 - 33.0 pg    MCHC 30.1 (L) 31.5 - 35.7 g/dL    RDW 16.2 (H) 12.3 - 15.4 %    RDW-SD 41.3 37.0 - 54.0 fl    MPV 11.2 6.0 - 12.0 fL    Platelets 375 140 - 450 10*3/mm3    Neutrophil % 51.4 42.7 - 76.0 %    Lymphocyte % 34.2 19.6 - 45.3 %    Monocyte % 7.9 5.0 - 12.0 %    Eosinophil % 4.7 0.3 - 6.2 %    Basophil % 1.5 0.0 - 1.5 %    Immature Grans % 0.3 0.0 - 0.5 %    Neutrophils, Absolute 4.67 1.70 - 7.00 10*3/mm3    Lymphocytes, Absolute 3.11 (H) 0.70 - 3.10 10*3/mm3    Monocytes, Absolute 0.72 0.10 - 0.90 10*3/mm3    Eosinophils, Absolute 0.43 (H) 0.00 - 0.40 10*3/mm3    Basophils, Absolute 0.14 0.00 - 0.20 10*3/mm3    Immature Grans, Absolute 0.03 0.00 - 0.05 10*3/mm3    nRBC 0.0 0.0 - 0.2 /100 WBC   Respiratory Panel PCR w/COVID-19(SARS-CoV-2) CYNTHIA/STEPHANE/DESTINEY/PAD/COR/MAD/CARRIE In-House, NP Swab in [facility] 3-4 HR TAT - Swab, Nasopharynx     Collection Time: 12/11/22  5:53 AM    Specimen: Nasopharynx; Swab   Result Value Ref Range    ADENOVIRUS, PCR Not Detected Not Detected    Coronavirus 229E Not Detected Not Detected    Coronavirus HKU1 Not Detected Not Detected    Coronavirus NL63 Not Detected Not Detected    Coronavirus OC43 Not Detected Not Detected    COVID19 Not Detected Not Detected - Ref. Range    Human Metapneumovirus Not Detected Not Detected    Human Rhinovirus/Enterovirus Not Detected Not Detected    Influenza A PCR Not Detected Not Detected    Influenza B PCR Not Detected Not Detected    Parainfluenza Virus 1 Not Detected Not Detected    Parainfluenza Virus 2 Not Detected Not Detected    Parainfluenza Virus 3 Not Detected Not Detected    Parainfluenza Virus 4 Not Detected Not Detected    RSV, PCR Not Detected Not Detected    Bordetella pertussis pcr Not Detected Not Detected    Bordetella parapertussis PCR Not Detected Not Detected    Chlamydophila pneumoniae PCR Not Detected Not Detected    Mycoplasma pneumo by PCR Not Detected Not Detected   Blood Gas, Venous With Co-Ox    Collection Time: 12/11/22  5:57 AM    Specimen: Venous Blood   Result Value Ref Range    Site Nurse/Dr Draw     pH, Venous 7.414 (H) 7.310 - 7.410 pH Units    pCO2, Venous 40.6 (L) 41.0 - 51.0 mm Hg    pO2, Venous 49.6 27.0 - 53.0 mm Hg    HCO3, Venous 25.9 22.0 - 28.0 mmol/L    Base Excess, Venous 1.2 -2.0 - 2.0 mmol/L    Hemoglobin, Blood Gas 13.8 (L) 14 - 18 g/dL    Oxyhemoglobin Venous 85.3 %    Methemoglobin Venous 0.4 %    Carboxyhemoglobin Venous 0.8 %    CO2 Content 27.2 22 - 33 mmol/L    Temperature 37.0 C    Barometric Pressure for Blood Gas      Modality Room Air     FIO2 21 %    Rate 0 Breaths/minute    PIP 0 cmH2O    IPAP 0     EPAP 0     Note     ECG 12 Lead Tachycardia    Collection Time: 12/11/22  8:11 AM   Result Value Ref Range    QT Interval 356 ms    QTC Interval 440 ms   Lactic Acid, Plasma    Collection Time: 12/11/22  8:25 AM    Specimen: Blood    Result Value Ref Range    Lactate 2.2 (C) 0.5 - 2.0 mmol/L   STAT Lactic Acid, Reflex    Collection Time: 12/11/22 10:37 AM    Specimen: Blood   Result Value Ref Range    Lactate 2.1 (C) 0.5 - 2.0 mmol/L   STAT Lactic Acid, Reflex    Collection Time: 12/11/22  2:33 PM    Specimen: Blood   Result Value Ref Range    Lactate 4.8 (C) 0.5 - 2.0 mmol/L   STAT Lactic Acid, Reflex    Collection Time: 12/11/22  4:45 PM    Specimen: Blood   Result Value Ref Range    Lactate 2.4 (C) 0.5 - 2.0 mmol/L       If labs were ordered, I independently reviewed the results.        RADIOLOGY  XR Chest 1 View    Result Date: 12/11/2022  EXAMINATION: Chest one DATE: 12/11/2022. COMPARISON: 4/21/2022. CLINICAL HISTORY: Persistent asthma exacerbation.     There is likely some bronchial wall thickening related to inflammatory airway changes. There is no focal consolidation otherwise seen. The cardiomediastinal silhouette and the pulmonary vessels are within normal limits. Electronically signed by:  Joaquin Peterson D.O.  12/11/2022 4:21 AM Mountain Time      I ordered and reviewed the above noted radiographic studies.      I viewed images of chest x-ray which shows mildly hyperinflated lungs and bronchial wall thickening but no acute pneumonia, pneumothorax, or other abnormalities that I can appreciate.    See radiologist's dictation for official interpretation.        PROCEDURES    Procedures    ECG 12 Lead Tachycardia   Preliminary Result   Test Reason : SOB   Blood Pressure :   */*   mmHG   Vent. Rate :  92 BPM     Atrial Rate :  92 BPM      P-R Int : 170 ms          QRS Dur :  98 ms       QT Int : 356 ms       P-R-T Axes :  52  70  22 degrees      QTc Int : 440 ms      Normal sinus rhythm   Normal ECG   When compared with ECG of 21-APR-2022 09:06,   No significant change was found      Referred By:            Confirmed By:           MEDICATIONS GIVEN IN ER    Medications   albuterol (PROVENTIL) nebulizer solution 0.083% 2.5 mg/3mL (2.5  mg Nebulization Given 12/11/22 1520)   ascorbic acid (VITAMIN C) tablet 500 mg (500 mg Oral Given 12/11/22 1255)   chlorhexidine (PERIDEX) 0.12 % solution 15 mL (15 mL Mouth/Throat Not Given 12/11/22 1810)   budesonide-formoterol (SYMBICORT) 160-4.5 MCG/ACT inhaler 2 puff (2 puffs Inhalation Given 12/11/22 1149)     And   tiotropium (SPIRIVA RESPIMAT) 2.5 mcg/act aerosol solution inhaler (2 puffs Inhalation Given 12/11/22 1150)   montelukast (SINGULAIR) tablet 10 mg (has no administration in time range)   pantoprazole (PROTONIX) EC tablet 40 mg (40 mg Oral Given 12/11/22 1303)   sodium chloride 0.9 % flush 10 mL (10 mL Intravenous Given 12/11/22 1305)   sodium chloride 0.9 % flush 10 mL (has no administration in time range)   sodium chloride 0.9 % infusion 40 mL (has no administration in time range)   Enoxaparin Sodium (LOVENOX) syringe 40 mg (40 mg Subcutaneous Given 12/11/22 1303)   acetaminophen (TYLENOL) tablet 650 mg (has no administration in time range)     Or   acetaminophen (TYLENOL) 160 MG/5ML solution 650 mg (has no administration in time range)     Or   acetaminophen (TYLENOL) suppository 650 mg (has no administration in time range)   ondansetron (ZOFRAN) tablet 4 mg (has no administration in time range)     Or   ondansetron (ZOFRAN) injection 4 mg (has no administration in time range)   methylPREDNISolone sodium succinate (SOLU-Medrol) injection 60 mg (60 mg Intravenous Given 12/11/22 1303)   methylPREDNISolone sodium succinate (SOLU-Medrol) injection 125 mg (125 mg Intravenous Given 12/11/22 0557)   ipratropium-albuterol (DUO-NEB) nebulizer solution 3 mL (3 mL Nebulization Given 12/11/22 0500)   magnesium sulfate in D5W 1g/100mL (PREMIX) (0 g Intravenous Stopped 12/11/22 0718)   ipratropium-albuterol (DUO-NEB) nebulizer solution 3 mL (3 mL Nebulization Given 12/11/22 0744)   AZITHROMYCIN 500 MG/250 ML 0.9% NS IVPB (vial-mate) (500 mg Intravenous New Bag 12/11/22 8461)   sodium chloride 0.9 % bolus 1,000  mL (1,000 mL Intravenous New Bag 12/11/22 0066)         PROGRESS, DATA ANALYSIS, CONSULTS, AND MEDICAL DECISION MAKING    All labs have been independently reviewed by me.  All radiology studies have been reviewed by me and the radiologist dictating the report.   EKG's have been independently viewed and interpreted by me.            ED Course as of 12/11/22 1951   Sun Dec 11, 2022   6912 In summary this is a 27-year-old female with history of severe persistent asthma who presents the ED for evaluation of difficulty breathing.  She says this has been going on for several days now on Thursday she went to [facility] where she felt better after being treated with Solu-Medrol, magnesium, and nebulizer treatments.  She was discharged on 40 mg of oral prednisone daily but says in the past she has needed higher doses than that.  Her symptoms have persisted since going home and she was going to see her pulmonologist on Monday but this evening she woke up feeling extremely short of breath after taking several albuterol treatments prior to going to sleep.  She continues to feel short of breath but says it is somewhat better than when she got here.  She has not appreciated any leg swelling or immobilization.  She feels like there may be an underlying respiratory infection.  Has not appreciated any fevers or systemic symptoms.  Continues to feel somewhat short of breath but denies pain.  No other complaints at this time. [CC]      ED Course User Index  [CC] Curt Hinojosa MD       She arrived tachypneic with wheezing in all lung fields.  She has history of severe persistent asthma that occasionally requires hospitalization.  Currently satting well on room air.  Treated with DuoNeb, Solu-Medrol, magnesium.  After treatment she actually started dropping her sats into the high 80s but upon reevaluation is back in the low 90s do not know that she would benefit from the oxygen but I am trying high flow nasal cannula  for improved work of breathing.  Chest x-ray shows bronchial thickening and mildly hyperinflated lungs.  She does have mildly elevated lactate and hypercapnia but otherwise labs generally reassuring and not actionable.  Full viral panel is negative.  Labs pointing away from pneumonia.  Nonetheless after multiple rounds of nebulizer treatments and IV medication she remains in mild distress I think needs hospitalization.  Medicine team consulted for admission.      AS OF 19:51 EST VITALS:    BP - 110/61  HR - 109  TEMP - 98.9 °F (37.2 °C) (Oral)  O2 SATS - 94%                  DIAGNOSIS  Final diagnoses:   Severe persistent asthma with acute exacerbation   Acute respiratory failure with hypoxia (HCC)   High serum lactate   Dyspnea, unspecified type         DISPOSITION  Admit             Curt Hinojosa MD  12/11/22 1953      Electronically signed by Curt Hinojosa MD at 12/11/22 1953       Vital Signs (last 2 days)     Date/Time Temp Temp src Pulse Resp BP Patient Position SpO2    12/12/22 0813 -- -- 68 16 -- -- 93    12/12/22 0700 98.9 (37.2) Oral 71 18 104/64 Lying 91    12/12/22 0411 -- -- -- -- 115/59  Sitting --    BP: BP recheck at 12/12/22 0411    12/12/22 0341 98 (36.7) Oral 102 18 91/51 Lying 94    12/12/22 0243 -- -- 89 18 -- -- 94    12/11/22 2329 97.9 (36.6) Oral 97 18 116/54 Lying 92    12/11/22 2221 -- -- 99 18 -- -- 92    12/11/22 1956 97.6 (36.4) Oral 104 18 126/72 Lying 92    12/11/22 1520 -- -- 109 16 -- -- 94    12/11/22 1149 -- -- 85 16 -- -- 95    12/11/22 1100 98.9 (37.2) Oral 80 18 110/61 Lying 94    12/11/22 1003 98.3 (36.8) Oral 96 18 105/71 -- --    12/11/22 0800 -- -- 95 -- 114/75 -- 90    12/11/22 0754 -- -- 105 -- -- -- 88    12/11/22 0744 -- -- -- 24 -- -- --    12/11/22 0730 -- -- 93 -- 110/76 -- 88    12/11/22 0700 -- -- 99 -- 116/71 -- 87    12/11/22 0500 -- -- -- -- -- -- 95    12/11/22 0318 98 (36.7) Oral 118 34 142/111 Sitting 94          Physician Progress Notes  (last 48 hours)  Notes from 12/10/22 1053 through 12/12/22 1053   No notes of this type exist for this encounter.       Consult Notes (last 48 hours)  Notes from 12/10/22 1053 through 12/12/22 1053   No notes of this type exist for this encounter.

## 2022-12-12 NOTE — ED PROVIDER NOTES
EMERGENCY DEPARTMENT ENCOUNTER    Pt Name: Yoli Regan  MRN: [id number]  Pt :   1995  Room Number:  S379/1  Date of encounter:  2022  PCP: Cora Hutchinson PA-C  ED Provider: Curt Hinojosa MD    Historian: Patient      HPI:  Chief Complaint: Dyspnea, asthma        Context: Yoli Regan is a 27-year-old female with history of severe persistent asthma who presents the ED for evaluation of difficulty breathing.  She says this has been going on for several days now on Thursday she went to [facility] where she felt better after being treated with Solu-Medrol, magnesium, and nebulizer treatments.  She was discharged on 40 mg of oral prednisone daily but says in the past she has needed higher doses than that.  Her symptoms have persisted since going home and she was going to see her pulmonologist on Monday but this evening she woke up feeling extremely short of breath after taking several albuterol treatments prior to going to sleep.  She continues to feel short of breath but says it is somewhat better than when she got here.  She has not appreciated any leg swelling or immobilization.  She feels like there may be an underlying respiratory infection.  Has not appreciated any fevers or systemic symptoms.  Continues to feel somewhat short of breath but denies pain.  No other complaints at this time.       PAST MEDICAL HISTORY  Past Medical History:   Diagnosis Date   • Allergic    • Asthma    • GERD (gastroesophageal reflux disease)    • Obesity 2016   • Pneumonia 2017   • Sleep apnea, obstructive     not diagnosed but drs have noticed during hospital stays         PAST SURGICAL HISTORY  Past Surgical History:   Procedure Laterality Date   • CHOLECYSTECTOMY           FAMILY HISTORY  Family History   Problem Relation Age of Onset   • Asthma Mother    • Sleep apnea Mother    • Diabetes type II Father    • Coronary artery disease Maternal Grandmother    • Heart attack  Maternal Grandmother    • Asthma Paternal Grandmother    • Hypertension Paternal Grandmother          SOCIAL HISTORY  Social History     Socioeconomic History   • Marital status: Single   Tobacco Use   • Smoking status: Never   • Smokeless tobacco: Never   Substance and Sexual Activity   • Alcohol use: No   • Drug use: No   • Sexual activity: Yes     Partners: Male     Birth control/protection: None         ALLERGIES  Patient has no known allergies.        REVIEW OF SYSTEMS  Review of Systems       All systems reviewed and negative except for those discussed in HPI.       PHYSICAL EXAM    I have reviewed the triage vital signs and nursing notes.    ED Triage Vitals [12/11/22 0318]   Temp Heart Rate Resp BP SpO2   98 °F (36.7 °C) 118 (!) 34 (!) 142/111 94 %      Temp src Heart Rate Source Patient Position BP Location FiO2 (%)   Oral Monitor Sitting Left arm --       Physical Exam  GENERAL:   Appears increased work of breathing but without acute distress  HENT: Nares patent.  EYES: No scleral icterus.  CV: Regular rhythm, regular rate.  RESPIRATORY: Mild tachypnea, decreased air movement, and expiratory wheezes in all lung fields  ABDOMEN: Soft, nontender  MUSCULOSKELETAL: No deformities.   NEURO: Alert, moves all extremities, follows commands.  SKIN: Warm, dry, no rash visualized.        LAB RESULTS  Recent Results (from the past 24 hour(s))   Comprehensive Metabolic Panel    Collection Time: 12/11/22  5:49 AM    Specimen: Blood   Result Value Ref Range    Glucose 104 (H) 65 - 99 mg/dL    BUN 13 6 - 20 mg/dL    Creatinine 0.65 0.57 - 1.00 mg/dL    Sodium 139 136 - 145 mmol/L    Potassium 3.5 3.5 - 5.2 mmol/L    Chloride 105 98 - 107 mmol/L    CO2 25.0 22.0 - 29.0 mmol/L    Calcium 9.4 8.6 - 10.5 mg/dL    Total Protein 6.8 6.0 - 8.5 g/dL    Albumin 4.20 3.50 - 5.20 g/dL    ALT (SGPT) 25 1 - 33 U/L    AST (SGOT) 23 1 - 32 U/L    Alkaline Phosphatase 52 39 - 117 U/L    Total Bilirubin 0.3 0.0 - 1.2 mg/dL    Globulin 2.6  gm/dL    A/G Ratio 1.6 g/dL    BUN/Creatinine Ratio 20.0 7.0 - 25.0    Anion Gap 9.0 5.0 - 15.0 mmol/L    eGFR 124.7 >60.0 mL/min/1.73   D-dimer, Quantitative    Collection Time: 12/11/22  5:49 AM    Specimen: Blood   Result Value Ref Range    D-Dimer, Quantitative <0.27 0.00 - 0.50 MCGFEU/mL   BNP    Collection Time: 12/11/22  5:49 AM    Specimen: Blood   Result Value Ref Range    proBNP 9.4 0.0 - 450.0 pg/mL   Troponin    Collection Time: 12/11/22  5:49 AM    Specimen: Blood   Result Value Ref Range    Troponin T <0.010 0.000 - 0.030 ng/mL   Procalcitonin    Collection Time: 12/11/22  5:49 AM    Specimen: Blood   Result Value Ref Range    Procalcitonin 0.03 0.00 - 0.25 ng/mL   Magnesium    Collection Time: 12/11/22  5:49 AM    Specimen: Blood   Result Value Ref Range    Magnesium 1.9 1.6 - 2.6 mg/dL   CBC Auto Differential    Collection Time: 12/11/22  5:49 AM    Specimen: Blood   Result Value Ref Range    WBC 9.10 3.40 - 10.80 10*3/mm3    RBC 5.96 (H) 3.77 - 5.28 10*6/mm3    Hemoglobin 13.5 12.0 - 15.9 g/dL    Hematocrit 44.8 34.0 - 46.6 %    MCV 75.2 (L) 79.0 - 97.0 fL    MCH 22.7 (L) 26.6 - 33.0 pg    MCHC 30.1 (L) 31.5 - 35.7 g/dL    RDW 16.2 (H) 12.3 - 15.4 %    RDW-SD 41.3 37.0 - 54.0 fl    MPV 11.2 6.0 - 12.0 fL    Platelets 375 140 - 450 10*3/mm3    Neutrophil % 51.4 42.7 - 76.0 %    Lymphocyte % 34.2 19.6 - 45.3 %    Monocyte % 7.9 5.0 - 12.0 %    Eosinophil % 4.7 0.3 - 6.2 %    Basophil % 1.5 0.0 - 1.5 %    Immature Grans % 0.3 0.0 - 0.5 %    Neutrophils, Absolute 4.67 1.70 - 7.00 10*3/mm3    Lymphocytes, Absolute 3.11 (H) 0.70 - 3.10 10*3/mm3    Monocytes, Absolute 0.72 0.10 - 0.90 10*3/mm3    Eosinophils, Absolute 0.43 (H) 0.00 - 0.40 10*3/mm3    Basophils, Absolute 0.14 0.00 - 0.20 10*3/mm3    Immature Grans, Absolute 0.03 0.00 - 0.05 10*3/mm3    nRBC 0.0 0.0 - 0.2 /100 WBC   Respiratory Panel PCR w/COVID-19(SARS-CoV-2) CYNTHIA/STEPHANE/DESTINEY/PAD/COR/MAD/CARRIE In-House, NP Swab in [facility], 3-4 HR TAT - Swab,  Nasopharynx    Collection Time: 12/11/22  5:53 AM    Specimen: Nasopharynx; Swab   Result Value Ref Range    ADENOVIRUS, PCR Not Detected Not Detected    Coronavirus 229E Not Detected Not Detected    Coronavirus HKU1 Not Detected Not Detected    Coronavirus NL63 Not Detected Not Detected    Coronavirus OC43 Not Detected Not Detected    COVID19 Not Detected Not Detected - Ref. Range    Human Metapneumovirus Not Detected Not Detected    Human Rhinovirus/Enterovirus Not Detected Not Detected    Influenza A PCR Not Detected Not Detected    Influenza B PCR Not Detected Not Detected    Parainfluenza Virus 1 Not Detected Not Detected    Parainfluenza Virus 2 Not Detected Not Detected    Parainfluenza Virus 3 Not Detected Not Detected    Parainfluenza Virus 4 Not Detected Not Detected    RSV, PCR Not Detected Not Detected    Bordetella pertussis pcr Not Detected Not Detected    Bordetella parapertussis PCR Not Detected Not Detected    Chlamydophila pneumoniae PCR Not Detected Not Detected    Mycoplasma pneumo by PCR Not Detected Not Detected   Blood Gas, Venous With Co-Ox    Collection Time: 12/11/22  5:57 AM    Specimen: Venous Blood   Result Value Ref Range    Site Nurse/Dr Draw     pH, Venous 7.414 (H) 7.310 - 7.410 pH Units    pCO2, Venous 40.6 (L) 41.0 - 51.0 mm Hg    pO2, Venous 49.6 27.0 - 53.0 mm Hg    HCO3, Venous 25.9 22.0 - 28.0 mmol/L    Base Excess, Venous 1.2 -2.0 - 2.0 mmol/L    Hemoglobin, Blood Gas 13.8 (L) 14 - 18 g/dL    Oxyhemoglobin Venous 85.3 %    Methemoglobin Venous 0.4 %    Carboxyhemoglobin Venous 0.8 %    CO2 Content 27.2 22 - 33 mmol/L    Temperature 37.0 C    Barometric Pressure for Blood Gas      Modality Room Air     FIO2 21 %    Rate 0 Breaths/minute    PIP 0 cmH2O    IPAP 0     EPAP 0     Note     ECG 12 Lead Tachycardia    Collection Time: 12/11/22  8:11 AM   Result Value Ref Range    QT Interval 356 ms    QTC Interval 440 ms   Lactic Acid, Plasma    Collection Time: 12/11/22  8:25 AM     Specimen: Blood   Result Value Ref Range    Lactate 2.2 (C) 0.5 - 2.0 mmol/L   STAT Lactic Acid, Reflex    Collection Time: 12/11/22 10:37 AM    Specimen: Blood   Result Value Ref Range    Lactate 2.1 (C) 0.5 - 2.0 mmol/L   STAT Lactic Acid, Reflex    Collection Time: 12/11/22  2:33 PM    Specimen: Blood   Result Value Ref Range    Lactate 4.8 (C) 0.5 - 2.0 mmol/L   STAT Lactic Acid, Reflex    Collection Time: 12/11/22  4:45 PM    Specimen: Blood   Result Value Ref Range    Lactate 2.4 (C) 0.5 - 2.0 mmol/L       If labs were ordered, I independently reviewed the results.        RADIOLOGY  XR Chest 1 View    Result Date: 12/11/2022  EXAMINATION: Chest one DATE: 12/11/2022. COMPARISON: 4/21/2022. CLINICAL HISTORY: Persistent asthma exacerbation.     There is likely some bronchial wall thickening related to inflammatory airway changes. There is no focal consolidation otherwise seen. The cardiomediastinal silhouette and the pulmonary vessels are within normal limits. Electronically signed by:  Joaquin Peterson D.O.  12/11/2022 4:21 AM Mountain Time      I ordered and reviewed the above noted radiographic studies.      I viewed images of chest x-ray which shows mildly hyperinflated lungs and bronchial wall thickening but no acute pneumonia, pneumothorax, or other abnormalities that I can appreciate.    See radiologist's dictation for official interpretation.        PROCEDURES    Procedures    ECG 12 Lead Tachycardia   Preliminary Result   Test Reason : SOB   Blood Pressure :   */*   mmHG   Vent. Rate :  92 BPM     Atrial Rate :  92 BPM      P-R Int : 170 ms          QRS Dur :  98 ms       QT Int : 356 ms       P-R-T Axes :  52  70  22 degrees      QTc Int : 440 ms      Normal sinus rhythm   Normal ECG   When compared with ECG of 21-APR-2022 09:06,   No significant change was found      Referred By:            Confirmed By:           MEDICATIONS GIVEN IN ER    Medications   albuterol (PROVENTIL) nebulizer solution 0.083%  2.5 mg/3mL (2.5 mg Nebulization Given 12/11/22 1520)   ascorbic acid (VITAMIN C) tablet 500 mg (500 mg Oral Given 12/11/22 1255)   chlorhexidine (PERIDEX) 0.12 % solution 15 mL (15 mL Mouth/Throat Not Given 12/11/22 1810)   budesonide-formoterol (SYMBICORT) 160-4.5 MCG/ACT inhaler 2 puff (2 puffs Inhalation Given 12/11/22 1149)     And   tiotropium (SPIRIVA RESPIMAT) 2.5 mcg/act aerosol solution inhaler (2 puffs Inhalation Given 12/11/22 1150)   montelukast (SINGULAIR) tablet 10 mg (has no administration in time range)   pantoprazole (PROTONIX) EC tablet 40 mg (40 mg Oral Given 12/11/22 1303)   sodium chloride 0.9 % flush 10 mL (10 mL Intravenous Given 12/11/22 1305)   sodium chloride 0.9 % flush 10 mL (has no administration in time range)   sodium chloride 0.9 % infusion 40 mL (has no administration in time range)   Enoxaparin Sodium (LOVENOX) syringe 40 mg (40 mg Subcutaneous Given 12/11/22 1303)   acetaminophen (TYLENOL) tablet 650 mg (has no administration in time range)     Or   acetaminophen (TYLENOL) 160 MG/5ML solution 650 mg (has no administration in time range)     Or   acetaminophen (TYLENOL) suppository 650 mg (has no administration in time range)   ondansetron (ZOFRAN) tablet 4 mg (has no administration in time range)     Or   ondansetron (ZOFRAN) injection 4 mg (has no administration in time range)   methylPREDNISolone sodium succinate (SOLU-Medrol) injection 60 mg (60 mg Intravenous Given 12/11/22 1303)   methylPREDNISolone sodium succinate (SOLU-Medrol) injection 125 mg (125 mg Intravenous Given 12/11/22 0557)   ipratropium-albuterol (DUO-NEB) nebulizer solution 3 mL (3 mL Nebulization Given 12/11/22 0500)   magnesium sulfate in D5W 1g/100mL (PREMIX) (0 g Intravenous Stopped 12/11/22 0718)   ipratropium-albuterol (DUO-NEB) nebulizer solution 3 mL (3 mL Nebulization Given 12/11/22 9557)   AZITHROMYCIN 500 MG/250 ML 0.9% NS IVPB (vial-mate) (500 mg Intravenous New Bag 12/11/22 3227)   sodium chloride  0.9 % bolus 1,000 mL (1,000 mL Intravenous New Bag 12/11/22 1819)         PROGRESS, DATA ANALYSIS, CONSULTS, AND MEDICAL DECISION MAKING    All labs have been independently reviewed by me.  All radiology studies have been reviewed by me and the radiologist dictating the report.   EKG's have been independently viewed and interpreted by me.            ED Course as of 12/11/22 1951   Sun Dec 11, 2022   8099 In summary this is a 27-year-old female with history of severe persistent asthma who presents the ED for evaluation of difficulty breathing.  She says this has been going on for several days now on Thursday she went to [facility] where she felt better after being treated with Solu-Medrol, magnesium, and nebulizer treatments.  She was discharged on 40 mg of oral prednisone daily but says in the past she has needed higher doses than that.  Her symptoms have persisted since going home and she was going to see her pulmonologist on Monday but this evening she woke up feeling extremely short of breath after taking several albuterol treatments prior to going to sleep.  She continues to feel short of breath but says it is somewhat better than when she got here.  She has not appreciated any leg swelling or immobilization.  She feels like there may be an underlying respiratory infection.  Has not appreciated any fevers or systemic symptoms.  Continues to feel somewhat short of breath but denies pain.  No other complaints at this time. [CC]      ED Course User Index  [CC] Curt Hinojosa MD       She arrived tachypneic with wheezing in all lung fields.  She has history of severe persistent asthma that occasionally requires hospitalization.  Currently satting well on room air.  Treated with DuoNeb, Solu-Medrol, magnesium.  After treatment she actually started dropping her sats into the high 80s but upon reevaluation is back in the low 90s do not know that she would benefit from the oxygen but I am trying high  flow nasal cannula for improved work of breathing.  Chest x-ray shows bronchial thickening and mildly hyperinflated lungs.  She does have mildly elevated lactate and hypercapnia but otherwise labs generally reassuring and not actionable.  Full viral panel is negative.  Labs pointing away from pneumonia.  Nonetheless after multiple rounds of nebulizer treatments and IV medication she remains in mild distress I think needs hospitalization.  Medicine team consulted for admission.      AS OF 19:51 EST VITALS:    BP - 110/61  HR - 109  TEMP - 98.9 °F (37.2 °C) (Oral)  O2 SATS - 94%                  DIAGNOSIS  Final diagnoses:   Severe persistent asthma with acute exacerbation   Acute respiratory failure with hypoxia (HCC)   High serum lactate   Dyspnea, unspecified type         DISPOSITION  Admit             Curt Hinojosa MD  12/11/22 1953

## 2022-12-12 NOTE — PLAN OF CARE
Goal Outcome Evaluation:           Progress: improving  Outcome Evaluation: Patient resting in bed. Has c/o soreness in lungs from increased work of breathing. Continued on high flow oxygen due to increased work of breathing. Ambulates with standby assist. VSS. NSR on tele. No further needs at this time.

## 2022-12-12 NOTE — PROGRESS NOTES
[facility] Medicine Services  PROGRESS NOTE    Patient Name: Yoli Regan  : 1995  MRN: [id number]    Date of Admission: 2022  Primary Care Physician: Cora Hutchinson PA-C    Subjective   Subjective     CC:  SOB    HPI:  Patient states that she is feeling better this morning, but still gets fairly winded anytime she takes the high flow off.  She still is having some tightness in her chest.  Has done better with symptom management since starting on the Trelegy    ROS:  Gen- No fevers, chills  CV- No chest pain, palpitations  Resp- No cough, dyspnea  GI- No N/V/D, abd pain         Objective   Objective     Vital Signs:   Temp:  [97.6 °F (36.4 °C)-98.9 °F (37.2 °C)] 98.9 °F (37.2 °C)  Heart Rate:  [] 68  Resp:  [16-18] 16  BP: ()/(51-72) 104/64  Flow (L/min):  [40] 40     Physical Exam:  Constitutional: No acute distress, awake, alert, high flow nasal cannula in place, able to speak in full sentences  HENT: NCAT, mucous membranes moist  Respiratory: Decreased expiratory breath sounds bilaterally, but worse on the left with some scattered wheezes on expiration on the left side, respiratory effort normal   Cardiovascular: Slightly tachycardic, no murmurs, rubs, or gallops  Gastrointestinal: Positive bowel sounds, soft, nontender, nondistended  Musculoskeletal: No bilateral ankle edema  Psychiatric: Appropriate affect, cooperative  Neurologic: Oriented x 3, strength symmetric in all extremities, Cranial Nerves grossly intact to confrontation, speech clear  Skin: No rashes      Results Reviewed:  LAB RESULTS:      Lab 22  0358 22  2213 22  1645 22  1433 22  1037 22  0825 22  0549   WBC 12.64*  --   --   --   --   --  9.10   HEMOGLOBIN 12.8  --   --   --   --   --  13.5   HEMATOCRIT 42.5  --   --   --   --   --  44.8   PLATELETS 394  --   --   --   --   --  375   NEUTROS ABS  --   --   --   --   --   --  4.67   IMMATURE  GRANS (ABS)  --   --   --   --   --   --  0.03   LYMPHS ABS  --   --   --   --   --   --  3.11*   MONOS ABS  --   --   --   --   --   --  0.72   EOS ABS  --   --   --   --   --   --  0.43*   MCV 76.0*  --   --   --   --   --  75.2*   PROCALCITONIN  --   --   --   --   --   --  0.03   LACTATE 1.1 3.0* 2.4* 4.8* 2.1*   < >  --    D DIMER QUANT  --   --   --   --   --   --  <0.27    < > = values in this interval not displayed.         Lab 12/12/22  0358 12/11/22  0549   SODIUM 140 139   POTASSIUM 4.7 3.5   CHLORIDE 109* 105   CO2 22.0 25.0   ANION GAP 9.0 9.0   BUN 10 13   CREATININE 0.46* 0.65   EGFR 135.5 124.7   GLUCOSE 159* 104*   CALCIUM 9.0 9.4   MAGNESIUM  --  1.9         Lab 12/12/22  0358 12/11/22  0549   TOTAL PROTEIN 6.2 6.8   ALBUMIN 3.90 4.20   GLOBULIN 2.3 2.6   ALT (SGPT) 22 25   AST (SGOT) 14 23   BILIRUBIN 0.6 0.3   ALK PHOS 55 52         Lab 12/11/22  0549   PROBNP 9.4   TROPONIN T <0.010                 Lab 12/11/22  0557   FIO2 21   CARBOXYHEMOGLOBIN (VENOUS) 0.8     Brief Urine Lab Results  (Last result in the past 365 days)      Color   Clarity   Blood   Leuk Est   Nitrite   Protein   CREAT   Urine HCG        04/21/22 0908               Negative             Microbiology Results Abnormal     Procedure Component Value - Date/Time    Blood Culture - Blood, Arm, Left [[id number]]  (Normal) Collected: 12/11/22 0825    Lab Status: Preliminary result Specimen: Blood from Arm, Left Updated: 12/12/22 0845     Blood Culture No growth at 24 hours    COVID PRE-OP / PRE-PROCEDURE SCREENING ORDER (NO ISOLATION) - Swab, Nasopharynx [[id number]]  (Normal) Collected: 12/11/22 0515    Lab Status: Final result Specimen: Swab from Nasopharynx Updated: 12/11/22 0659    Narrative:      The following orders were created for panel order COVID PRE-OP / PRE-PROCEDURE SCREENING ORDER (NO ISOLATION) - Swab, Nasopharynx.  Procedure                               Abnormality         Status                     ---------                                -----------         ------                     Respiratory Panel PCR w/...[[id number]]  Normal              Final result                 Please view results for these tests on the individual orders.    Respiratory Panel PCR w/COVID-19(SARS-CoV-2) CYNTHIA/STEPHANE/DESTINEY/PAD/COR/MAD/CARRIE In-House, NP Swab in UTM/VTM, 3-4 HR TAT - Swab, Nasopharynx [[id number]]  (Normal) Collected: 12/11/22 0553    Lab Status: Final result Specimen: Swab from Nasopharynx Updated: 12/11/22 0659     ADENOVIRUS, PCR Not Detected     Coronavirus 229E Not Detected     Coronavirus HKU1 Not Detected     Coronavirus NL63 Not Detected     Coronavirus OC43 Not Detected     COVID19 Not Detected     Human Metapneumovirus Not Detected     Human Rhinovirus/Enterovirus Not Detected     Influenza A PCR Not Detected     Influenza B PCR Not Detected     Parainfluenza Virus 1 Not Detected     Parainfluenza Virus 2 Not Detected     Parainfluenza Virus 3 Not Detected     Parainfluenza Virus 4 Not Detected     RSV, PCR Not Detected     Bordetella pertussis pcr Not Detected     Bordetella parapertussis PCR Not Detected     Chlamydophila pneumoniae PCR Not Detected     Mycoplasma pneumo by PCR Not Detected    Narrative:      In the setting of a positive respiratory panel with a viral infection PLUS a negative procalcitonin without other underlying concern for bacterial infection, consider observing off antibiotics or discontinuation of antibiotics and continue supportive care. If the respiratory panel is positive for atypical bacterial infection (Bordetella pertussis, Chlamydophila pneumoniae, or Mycoplasma pneumoniae), consider antibiotic de-escalation to target atypical bacterial infection.          XR Chest 1 View    Result Date: 12/11/2022  EXAMINATION: Chest one DATE: 12/11/2022. COMPARISON: 4/21/2022. CLINICAL HISTORY: Persistent asthma exacerbation.     Impression: There is likely some bronchial wall thickening related to inflammatory airway  changes. There is no focal consolidation otherwise seen. The cardiomediastinal silhouette and the pulmonary vessels are within normal limits. Electronically signed by:  Joaquin Peterson D.O.  12/11/2022 4:21 AM Mountain Time          I have reviewed the medications:  Scheduled Meds:albuterol, 2.5 mg, Nebulization, Q4H - RT  ascorbic acid, 500 mg, Oral, Daily  budesonide-formoterol, 2 puff, Inhalation, BID - RT   And  tiotropium bromide monohydrate, 2 puff, Inhalation, Daily - RT  chlorhexidine, 15 mL, Mouth/Throat, BID  enoxaparin, 40 mg, Subcutaneous, Q12H  methylPREDNISolone sodium succinate, 60 mg, Intravenous, Q8H  montelukast, 10 mg, Oral, Nightly  pantoprazole, 40 mg, Oral, QAM  sodium chloride, 10 mL, Intravenous, Q12H      Continuous Infusions:   PRN Meds:.•  acetaminophen **OR** acetaminophen **OR** acetaminophen  •  ondansetron **OR** ondansetron  •  sodium chloride  •  sodium chloride    Assessment & Plan   Assessment & Plan     Active Hospital Problems    Diagnosis  POA   • Asthma exacerbation [J45.901]  Yes   • GERD (gastroesophageal reflux disease) [K21.9]  Yes   • Morbid obesity with BMI of 40.0-44.9, adult ([facility]) [E66.01, Z68.41]  Not Applicable   • Acute respiratory failure with hypoxia ([facility]) [J96.01]  Yes      Resolved Hospital Problems   No resolved problems to display.        Brief Hospital Course to date:  Yoli Regan is a 27 y.o. female with a hx of severe persistent asthma, presents with an acute asthma exacerbation with acute hypoxic respiratory failure.    Asthma Exacerbation  Respiratory Failure  - patient continues on HFNC due to work of breathing  -  has had multiple admissions for asthma exacerbations and follows with pulmonology   - Solumedrol 60mg IV Q8H  - Q4H duonebs  - Respiratory PCR/Covid/Flu negative  - CXR Stable  -Works as a  and also has a elementary school child    GERD  - continue PPI    Hyperglycemia:  Expected in the setting of IV steroids  -will  monitor, no need for insulin coverage at this time  -HgA1c was 5.7 in April.  Will repeat here.      Expected Discharge Location and Transportation: home  Expected Discharge Date: 12/13    DVT prophylaxis:  Medical DVT prophylaxis orders are present.          CODE STATUS:   Code Status and Medical Interventions:   Ordered at: 12/11/22 0953     Level Of Support Discussed With:    Patient     Code Status (Patient has no pulse and is not breathing):    CPR (Attempt to Resuscitate)     Medical Interventions (Patient has pulse or is breathing):    Full Support       Rimma Escamilla MD  12/12/22

## 2022-12-12 NOTE — PLAN OF CARE
Goal Outcome Evaluation:  Plan of Care Reviewed With: patient        Progress: improving   Pt is alert and oriented x4. Vss on high flow NC. NSR to sinus tach on tele. Pt has slept off and on throughout the shift. Minimal c/o of a sore back due to SOB. Lactic acid of 3.0 this shift; HAMILTON Velez paged this shift; 1000mL NS bolus ordered and given. Pt is up with stand-by to the bathroom; voiding spontaneously. Small BM this shift. Will continue to monitor.

## 2022-12-13 LAB
ARTERIAL PATENCY WRIST A: POSITIVE
ATMOSPHERIC PRESS: ABNORMAL MM[HG]
BASE EXCESS BLDA CALC-SCNC: 1.4 MMOL/L (ref 0–2)
BDY SITE: ABNORMAL
BODY TEMPERATURE: 37 C
CO2 BLDA-SCNC: 26 MMOL/L (ref 22–33)
COHGB MFR BLD: 0.6 % (ref 0–2)
EPAP: 0
GLUCOSE BLDC GLUCOMTR-MCNC: 142 MG/DL (ref 70–130)
HCO3 BLDA-SCNC: 24.9 MMOL/L (ref 20–26)
HCT VFR BLD CALC: 42 % (ref 38–51)
HGB BLDA-MCNC: 13.7 G/DL (ref 14–18)
INHALED O2 CONCENTRATION: 44 %
IPAP: 0
METHGB BLD QL: 0.4 % (ref 0–1.5)
MODALITY: ABNORMAL
NOTE: ABNORMAL
OXYHGB MFR BLDV: 95.6 % (ref 94–99)
PAW @ PEAK INSP FLOW SETTING VENT: 0 CMH2O
PCO2 BLDA: 35.2 MM HG (ref 35–45)
PCO2 TEMP ADJ BLD: 35.2 MM HG (ref 35–45)
PH BLDA: 7.46 PH UNITS (ref 7.35–7.45)
PH, TEMP CORRECTED: 7.46 PH UNITS
PO2 BLDA: 75.2 MM HG (ref 83–108)
PO2 TEMP ADJ BLD: 75.2 MM HG (ref 83–108)
TOTAL RATE: 0 BREATHS/MINUTE

## 2022-12-13 PROCEDURE — 25010000002 ENOXAPARIN PER 10 MG: Performed by: HOSPITALIST

## 2022-12-13 PROCEDURE — 25010000002 METHYLPREDNISOLONE PER 125 MG: Performed by: HOSPITALIST

## 2022-12-13 PROCEDURE — 82962 GLUCOSE BLOOD TEST: CPT

## 2022-12-13 PROCEDURE — 94761 N-INVAS EAR/PLS OXIMETRY MLT: CPT

## 2022-12-13 PROCEDURE — 83050 HGB METHEMOGLOBIN QUAN: CPT

## 2022-12-13 PROCEDURE — 99232 SBSQ HOSP IP/OBS MODERATE 35: CPT | Performed by: PEDIATRICS

## 2022-12-13 PROCEDURE — 63710000001 PREDNISONE PER 1 MG: Performed by: PEDIATRICS

## 2022-12-13 PROCEDURE — 36600 WITHDRAWAL OF ARTERIAL BLOOD: CPT

## 2022-12-13 PROCEDURE — 82375 ASSAY CARBOXYHB QUANT: CPT

## 2022-12-13 PROCEDURE — 94799 UNLISTED PULMONARY SVC/PX: CPT

## 2022-12-13 PROCEDURE — 99222 1ST HOSP IP/OBS MODERATE 55: CPT | Performed by: INTERNAL MEDICINE

## 2022-12-13 PROCEDURE — 82805 BLOOD GASES W/O2 SATURATION: CPT

## 2022-12-13 RX ORDER — METHYLPREDNISOLONE SODIUM SUCCINATE 40 MG/ML
40 INJECTION, POWDER, LYOPHILIZED, FOR SOLUTION INTRAMUSCULAR; INTRAVENOUS EVERY 8 HOURS
Status: DISCONTINUED | OUTPATIENT
Start: 2022-12-13 | End: 2022-12-13

## 2022-12-13 RX ORDER — PREDNISONE 20 MG/1
40 TABLET ORAL
Status: DISCONTINUED | OUTPATIENT
Start: 2022-12-13 | End: 2022-12-14 | Stop reason: HOSPADM

## 2022-12-13 RX ADMIN — METHYLPREDNISOLONE SODIUM SUCCINATE 60 MG: 125 INJECTION, POWDER, FOR SOLUTION INTRAMUSCULAR; INTRAVENOUS at 11:17

## 2022-12-13 RX ADMIN — ALBUTEROL SULFATE 2.5 MG: 2.5 SOLUTION RESPIRATORY (INHALATION) at 23:52

## 2022-12-13 RX ADMIN — BUDESONIDE AND FORMOTEROL FUMARATE DIHYDRATE 2 PUFF: 160; 4.5 AEROSOL RESPIRATORY (INHALATION) at 20:20

## 2022-12-13 RX ADMIN — Medication 10 ML: at 08:05

## 2022-12-13 RX ADMIN — MONTELUKAST 10 MG: 10 TABLET, FILM COATED ORAL at 21:42

## 2022-12-13 RX ADMIN — ALBUTEROL SULFATE 2.5 MG: 2.5 SOLUTION RESPIRATORY (INHALATION) at 20:20

## 2022-12-13 RX ADMIN — ALBUTEROL SULFATE 2.5 MG: 2.5 SOLUTION RESPIRATORY (INHALATION) at 15:31

## 2022-12-13 RX ADMIN — ALBUTEROL SULFATE 2.5 MG: 2.5 SOLUTION RESPIRATORY (INHALATION) at 07:58

## 2022-12-13 RX ADMIN — CHLORHEXIDINE GLUCONATE 15 ML: 1.2 SOLUTION ORAL at 11:17

## 2022-12-13 RX ADMIN — CHLORHEXIDINE GLUCONATE 15 ML: 1.2 SOLUTION ORAL at 23:02

## 2022-12-13 RX ADMIN — OXYCODONE HYDROCHLORIDE AND ACETAMINOPHEN 500 MG: 500 TABLET ORAL at 08:03

## 2022-12-13 RX ADMIN — Medication 10 ML: at 21:43

## 2022-12-13 RX ADMIN — ALBUTEROL SULFATE 2.5 MG: 2.5 SOLUTION RESPIRATORY (INHALATION) at 11:38

## 2022-12-13 RX ADMIN — ENOXAPARIN SODIUM 40 MG: 40 INJECTION SUBCUTANEOUS at 00:39

## 2022-12-13 RX ADMIN — PANTOPRAZOLE SODIUM 40 MG: 40 TABLET, DELAYED RELEASE ORAL at 06:04

## 2022-12-13 RX ADMIN — BUDESONIDE AND FORMOTEROL FUMARATE DIHYDRATE 2 PUFF: 160; 4.5 AEROSOL RESPIRATORY (INHALATION) at 07:58

## 2022-12-13 RX ADMIN — ALBUTEROL SULFATE 2.5 MG: 2.5 SOLUTION RESPIRATORY (INHALATION) at 02:27

## 2022-12-13 RX ADMIN — TIOTROPIUM BROMIDE INHALATION SPRAY 2 PUFF: 3.12 SPRAY, METERED RESPIRATORY (INHALATION) at 07:59

## 2022-12-13 RX ADMIN — ENOXAPARIN SODIUM 40 MG: 40 INJECTION SUBCUTANEOUS at 11:18

## 2022-12-13 RX ADMIN — PREDNISONE 40 MG: 20 TABLET ORAL at 21:42

## 2022-12-13 RX ADMIN — ENOXAPARIN SODIUM 40 MG: 40 INJECTION SUBCUTANEOUS at 23:23

## 2022-12-13 RX ADMIN — METHYLPREDNISOLONE SODIUM SUCCINATE 60 MG: 125 INJECTION, POWDER, FOR SOLUTION INTRAMUSCULAR; INTRAVENOUS at 02:30

## 2022-12-13 NOTE — NURSING NOTE
"Pt called primary nurse into her room around 0600 this am. Pt said she was feeling \"weird\"; that she felt \"jittery\". C/o of pins and needles all over her body; even her tongue. Blood sugar checked and was 142. No c/o of SOB; on 35% high flow NC. WILLIE Bowman paged this shift; will see pt at bedside. Stat ABGs ordered. Awaiting results. Will continue to monitor.   "

## 2022-12-13 NOTE — CONSULTS
Pulmonary Hospital Consultation       Reason for Consultation: Severe persistent asthma with acute exacerbation  Referring Provider: Rimma Escamilla, *      History of Present Illness     28 yo female with history of severe asthma (normal PFT recently) with multiple hospital admissions in last couple of years, obesity, Eczema, environmental allergies admitted with worsening cough, SOA going on since last week. She went to [facility] and was given steroids but continued to feel worse so came here for admission. States that she works as a  and gets exposed to lot of different URIs. Also has multiple allergies as well. Also has 5 yo at home and whenever she gets sick pt gets sick as well. Pt was admitted here and so far seems to be improving. Currently on HFNC.     Pt states that ever since she was placed on Trelegy inhaler she has done better and has not required many admissions.     She denies any fever/chills. Denies any chest pain. No history of thromboembolic disease. No hemoptysis. No significant sinus disease or post nasal drip. Denies significant GERD symptoms.     Does snore at night and has difficult time maintaining sleep.       Problem List, Surgical History, Family, Social History, and ROS     Patient Active Problem List    Diagnosis    • *Severe persistent asthma with acute exacerbation [J45.51]    • Asthma exacerbation [J45.901]    • Suspected sleep apnea [R29.818]    • Well woman exam [Z01.419]    • Vitamin D deficiency [E55.9]    • Encounter for contraceptive management [Z30.9]    • Severe persistent asthma without complication [J45.50]    • Morbid obesity with BMI of 40.0-44.9, adult ([facility]) [E66.01, Z68.41]    • GERD (gastroesophageal reflux disease) [K21.9]    • Acute respiratory failure with hypoxia ([facility]) [J96.01]    • Environmental allergies [Z91.09]      Past Surgical History:   Procedure Laterality Date   • CHOLECYSTECTOMY         No Known Allergies  No current  "facility-administered medications on file prior to encounter.     Current Outpatient Medications on File Prior to Encounter   Medication Sig   • acetaminophen (TYLENOL) 325 MG tablet Take 2 tablets by mouth Every 4 (Four) Hours As Needed for Mild Pain .   • albuterol (PROVENTIL) (2.5 MG/3ML) 0.083% nebulizer solution Inhale 1 vial VIA nebulizer every 4 hours as needed for wheezing   • albuterol sulfate  (90 Base) MCG/ACT inhaler Inhale 2 puffs Every 4 (Four) Hours As Needed for Wheezing.   • Ascorbic Acid (Vitamin C) 500 MG capsule Take 1 capsule by mouth Daily.   • chlorhexidine (Peridex) 0.12 % solution Apply 15 mL to the mouth or throat 2 (Two) Times a Day.   • Fluticasone-Umeclidin-Vilant (TRELEGY) 200-62.5-25 MCG/INH inhaler Inhale 1 puff Daily.   • ipratropium-albuterol (DUO-NEB) 0.5-2.5 mg/3 ml nebulizer Take 3 mL by nebulization Every 4 (Four) Hours.   • montelukast (SINGULAIR) 10 MG tablet Take 1 tablet by mouth Every Night.   • omeprazole OTC (PriLOSEC OTC) 20 MG EC tablet Take 1 tablet by mouth Daily As Needed (reflux symptoms). Take ONLY WHEN ON STEROIDS     MEDICATION LIST AND ALLERGIES REVIEWED.    Family History   Problem Relation Age of Onset   • Asthma Mother    • Sleep apnea Mother    • Diabetes type II Father    • Coronary artery disease Maternal Grandmother    • Heart attack Maternal Grandmother    • Asthma Paternal Grandmother    • Hypertension Paternal Grandmother      Social History     Tobacco Use   • Smoking status: Never   • Smokeless tobacco: Never   Substance Use Topics   • Alcohol use: No   • Drug use: No     Social History     Social History Narrative   • Not on file     FAMILY AND SOCIAL HISTORY REVIEWED.    Review of Systems  ALL OTHER SYSTEMS REVIEWED AND ARE NEGATIVE.    Physical Exam and Clinical Information   /68 (BP Location: Right arm, Patient Position: Lying)   Pulse 71   Temp 98 °F (36.7 °C) (Oral)   Resp 15   Ht 152.4 cm (60\")   Wt 113 kg (250 lb)   SpO2 " 96%   BMI 48.82 kg/m²   Physical Exam    General Appearance: Awake, alert, in no acute distress  Head:    Atraumatic, Normocephalic, without obvious abnormality, Pupils reactive & symmetrical B/L.   Lungs:   B/L Breath sounds present with decreased breath sounds on bases, no wheezing heard, no crackles.   Heart: S1 and S2 present, no murmur  Abdomen: Soft, non-tender, no guarding or rigidity, bowel sounds positive, no masses.  Extremities: Atraumatic, no cyanosis or clubbing,  no edema, wwarm to touch.  Pulses: Positive and symmetric.  Neurologic:  Moving all four extremities. Good strength bilaterally.  Psychologic: Appropriate affect, Cooperative.     Results from last 7 days   Lab Units 12/12/22  0358 12/11/22  0549   WBC 10*3/mm3 12.64* 9.10   HEMOGLOBIN g/dL 12.8 13.5   PLATELETS 10*3/mm3 394 375     Results from last 7 days   Lab Units 12/12/22  0358 12/11/22  0549   SODIUM mmol/L 140 139   POTASSIUM mmol/L 4.7 3.5   CO2 mmol/L 22.0 25.0   BUN mg/dL 10 13   CREATININE mg/dL 0.46* 0.65   MAGNESIUM mg/dL  --  1.9   GLUCOSE mg/dL 159* 104*     Estimated Creatinine Clearance: 212.1 mL/min (A) (by C-G formula based on SCr of 0.46 mg/dL (L)).  Results from last 7 days   Lab Units 12/12/22  0358   HEMOGLOBIN A1C % 5.20     Results from last 7 days   Lab Units 12/13/22  0635   PH, ARTERIAL pH units 7.458*   PCO2, ARTERIAL mm Hg 35.2   PO2 ART mm Hg 75.2*     Lab Results   Component Value Date    LACTATE 1.1 12/12/2022        Images: CXR reviewed and no acute cardiopulmonary disease process noted. No definite consolidation, no pleural effusions.      I reviewed the patient's results and images.     Impression     Severe persistent asthma with acute exacerbation    Acute respiratory failure with hypoxia (HCC)    Morbid obesity with BMI of 40.0-44.9, adult (HCC)    GERD (gastroesophageal reflux disease)    Asthma exacerbation    Plan/Recommendations     1. Pt with history of severe asthma with multiple flare ups  requiring hospital admissions. Doing better currently but still on HFNC. Discussed with nursing that we should be able to wean to regular NC and wean off O2 as long as saturations are 92% or better.   2. Continue current symbicort and spiriva. Pt on home trelegy.   3. Continue current nebs.   4. Adjust steroids to 40 mg prednisone daily and consider taper off in next 4-5 days.   5. Continue GERD treatment with protonix.   6. Outpatient consider biologics for severe asthma such as mepolozumab.  7. Outpt sleep study. Pt likely has sleep apnea and that may be causing nocturnal hypoxemia.   8. Pt has not had influenza vaccination yet. Would recommend on discharge.     Once able to wean off O2 hopefully can be discharged home with close outpatient pulmonary follow up.     Time spent 40 min time (exclusive of procedure time)  including high complexity decision making to assess, manipulate, and support vital organ system and discussions with other staff taking care of this patient.      Rosalio Nugent MD, [facility]  Pulmonary and Critical Care Medicine  12/13/22 11:17 EST     CC: Cora Hutchinson PA-C

## 2022-12-13 NOTE — PLAN OF CARE
Goal Outcome Evaluation:           Progress: improving  Outcome Evaluation: Patient resting in bed. Weaned off of high flow oxygen to nasal cannula. Tolerating well. O2 saturations maintaining at 92% and above. NSR on tele. VSS. No further needs at this time.

## 2022-12-13 NOTE — CASE MANAGEMENT/SOCIAL WORK
Continued Stay Note  [facility]     Patient Name: Yoli Regan  MRN: [id number]  Today's Date: 12/13/2022    Admit Date: 12/11/2022    Plan: Home   Discharge Plan     Row Name 12/13/22 1612       Plan    Plan Home    Plan Comments Case mgt f/u. chart reviewed. Plan remains to return home when medically ready.She states she does have a home nebulizer, she has never required home O2. Case mgt will con't to follow for any home needs               Discharge Codes    No documentation.               Expected Discharge Date and Time     Expected Discharge Date Expected Discharge Time    Dec 14, 2022             Sonja C Kellerman, RN

## 2022-12-13 NOTE — PROGRESS NOTES
[facility] Medicine Services  PROGRESS NOTE    Patient Name: Yoli Regan  : 1995  MRN: [id number]    Date of Admission: 2022  Primary Care Physician: Cora Hutchinson PA-C    Subjective   Subjective     CC:  SOB    HPI:  Patient states that she is feeling better.  Was having some tingling this morning which has improved.  Pulm seen her this morning and switched her off the HFNC.     ROS:  Gen- No fevers, chills  CV- No chest pain, palpitations  Resp- No cough, dyspnea  GI- No N/V/D, abd pain         Objective   Objective     Vital Signs:   Temp:  [97.6 °F (36.4 °C)-98.9 °F (37.2 °C)] 98.6 °F (37 °C)  Heart Rate:  [] 67  Resp:  [15-18] 16  BP: ()/(49-80) 123/58  Flow (L/min):  [4-40] 4     Physical Exam:  Constitutional: No acute distress, awake, alert, high flow nasal cannula in place, able to speak in full sentences  HENT: NCAT, mucous membranes moist  Respiratory: Decreased air movement on expiration but without any wheezing.  Cardiovascular: RRR, no murmurs, rubs, or gallops  Gastrointestinal: Positive bowel sounds, soft, nontender, nondistended  Musculoskeletal: No bilateral ankle edema  Psychiatric: Appropriate affect, cooperative  Neurologic: Oriented x 3, strength symmetric in all extremities, Cranial Nerves grossly intact to confrontation, speech clear  Skin: No rashes      Results Reviewed:  LAB RESULTS:      Lab 22  0358 22  2213 22  1645 22  1433 22  1037 22  0825 22  0549   WBC 12.64*  --   --   --   --   --  9.10   HEMOGLOBIN 12.8  --   --   --   --   --  13.5   HEMATOCRIT 42.5  --   --   --   --   --  44.8   PLATELETS 394  --   --   --   --   --  375   NEUTROS ABS  --   --   --   --   --   --  4.67   IMMATURE GRANS (ABS)  --   --   --   --   --   --  0.03   LYMPHS ABS  --   --   --   --   --   --  3.11*   MONOS ABS  --   --   --   --   --   --  0.72   EOS ABS  --   --   --   --   --   --  0.43*    MCV 76.0*  --   --   --   --   --  75.2*   PROCALCITONIN  --   --   --   --   --   --  0.03   LACTATE 1.1 3.0* 2.4* 4.8* 2.1*   < >  --    D DIMER QUANT  --   --   --   --   --   --  <0.27    < > = values in this interval not displayed.         Lab 12/12/22  0358 12/11/22  0549   SODIUM 140 139   POTASSIUM 4.7 3.5   CHLORIDE 109* 105   CO2 22.0 25.0   ANION GAP 9.0 9.0   BUN 10 13   CREATININE 0.46* 0.65   EGFR 135.5 124.7   GLUCOSE 159* 104*   CALCIUM 9.0 9.4   MAGNESIUM  --  1.9   HEMOGLOBIN A1C 5.20  --          Lab 12/12/22  0358 12/11/22  0549   TOTAL PROTEIN 6.2 6.8   ALBUMIN 3.90 4.20   GLOBULIN 2.3 2.6   ALT (SGPT) 22 25   AST (SGOT) 14 23   BILIRUBIN 0.6 0.3   ALK PHOS 55 52         Lab 12/11/22  0549   PROBNP 9.4   TROPONIN T <0.010                 Lab 12/13/22  0635 12/11/22  0557   PH, ARTERIAL 7.458*  --    PCO2, ARTERIAL 35.2  --    PO2 ART 75.2*  --    FIO2 44 21   HCO3 ART 24.9  --    BASE EXCESS ART 1.4  --    CARBOXYHEMOGLOBIN 0.6  --    CARBOXYHEMOGLOBIN (VENOUS)  --  0.8     Brief Urine Lab Results  (Last result in the past 365 days)      Color   Clarity   Blood   Leuk Est   Nitrite   Protein   CREAT   Urine HCG        04/21/22 0908               Negative             Microbiology Results Abnormal     Procedure Component Value - Date/Time    Blood Culture - Blood, Arm, Right [[id number]]  (Normal) Collected: 12/11/22 0833    Lab Status: Preliminary result Specimen: Blood from Arm, Right Updated: 12/13/22 0915     Blood Culture No growth at 2 days    Blood Culture - Blood, Arm, Left [[id number]]  (Normal) Collected: 12/11/22 0825    Lab Status: Preliminary result Specimen: Blood from Arm, Left Updated: 12/13/22 0845     Blood Culture No growth at 2 days    COVID PRE-OP / PRE-PROCEDURE SCREENING ORDER (NO ISOLATION) - Swab, Nasopharynx [[id number]]  (Normal) Collected: 12/11/22 0553    Lab Status: Final result Specimen: Swab from Nasopharynx Updated: 12/11/22 0659    Narrative:      The following  orders were created for panel order COVID PRE-OP / PRE-PROCEDURE SCREENING ORDER (NO ISOLATION) - Swab, Nasopharynx.  Procedure                               Abnormality         Status                     ---------                               -----------         ------                     Respiratory Panel PCR w/...[[id number]]  Normal              Final result                 Please view results for these tests on the individual orders.    Respiratory Panel PCR w/COVID-19(SARS-CoV-2) CYNTHIA/STEPHANE/DESTINEY/PAD/COR/MAD/CARRIE In-House, NP Swab in UTM/VTM, 3-4 HR TAT - Swab, Nasopharynx [[id number]]  (Normal) Collected: 12/11/22 0553    Lab Status: Final result Specimen: Swab from Nasopharynx Updated: 12/11/22 0659     ADENOVIRUS, PCR Not Detected     Coronavirus 229E Not Detected     Coronavirus HKU1 Not Detected     Coronavirus NL63 Not Detected     Coronavirus OC43 Not Detected     COVID19 Not Detected     Human Metapneumovirus Not Detected     Human Rhinovirus/Enterovirus Not Detected     Influenza A PCR Not Detected     Influenza B PCR Not Detected     Parainfluenza Virus 1 Not Detected     Parainfluenza Virus 2 Not Detected     Parainfluenza Virus 3 Not Detected     Parainfluenza Virus 4 Not Detected     RSV, PCR Not Detected     Bordetella pertussis pcr Not Detected     Bordetella parapertussis PCR Not Detected     Chlamydophila pneumoniae PCR Not Detected     Mycoplasma pneumo by PCR Not Detected    Narrative:      In the setting of a positive respiratory panel with a viral infection PLUS a negative procalcitonin without other underlying concern for bacterial infection, consider observing off antibiotics or discontinuation of antibiotics and continue supportive care. If the respiratory panel is positive for atypical bacterial infection (Bordetella pertussis, Chlamydophila pneumoniae, or Mycoplasma pneumoniae), consider antibiotic de-escalation to target atypical bacterial infection.          No radiology results from the  last 24 hrs        I have reviewed the medications:  Scheduled Meds:albuterol, 2.5 mg, Nebulization, Q4H - RT  ascorbic acid, 500 mg, Oral, Daily  budesonide-formoterol, 2 puff, Inhalation, BID - RT   And  tiotropium bromide monohydrate, 2 puff, Inhalation, Daily - RT  chlorhexidine, 15 mL, Mouth/Throat, BID  enoxaparin, 40 mg, Subcutaneous, Q12H  montelukast, 10 mg, Oral, Nightly  pantoprazole, 40 mg, Oral, QAM  predniSONE, 40 mg, Oral, Daily With Breakfast  sodium chloride, 10 mL, Intravenous, Q12H      Continuous Infusions:   PRN Meds:.•  acetaminophen **OR** acetaminophen **OR** acetaminophen  •  ondansetron **OR** ondansetron  •  sodium chloride  •  sodium chloride    Assessment & Plan   Assessment & Plan     Active Hospital Problems    Diagnosis  POA   • **Severe persistent asthma with acute exacerbation [J45.51]  Yes   • Asthma exacerbation [J45.901]  Yes   • GERD (gastroesophageal reflux disease) [K21.9]  Yes   • Morbid obesity with BMI of 40.0-44.9, adult ([facility]) [E66.01, Z68.41]  Not Applicable   • Acute respiratory failure with hypoxia ([facility]) [J96.01]  Yes      Resolved Hospital Problems   No resolved problems to display.        Brief Hospital Course to date:  Yoli Regan is a 27 y.o. female with a hx of severe persistent asthma, presents with an acute asthma exacerbation with acute hypoxic respiratory failure.    Asthma Exacerbation  Respiratory Failure  - Wean O2 as tolerated.  -  has had multiple admissions for asthma exacerbations and follows with pulmonology   - d/c solumedrol and switch to prednisone  - Q4H duonebs  - Respiratory PCR/Covid/Flu negative  - CXR Stable  -Appreciate pulm recs.  Needs f/u soon after d/c    GERD  - continue PPI    Hyperglycemia:  Expected in the setting of IV steroids  -will monitor, no need for insulin coverage at this time  -HgA1c 5.2    Expected Discharge Location and Transportation: home  Expected Discharge Date: 12/14    DVT prophylaxis:  Medical DVT  prophylaxis orders are present.          CODE STATUS:   Code Status and Medical Interventions:   Ordered at: 12/11/22 0953     Level Of Support Discussed With:    Patient     Code Status (Patient has no pulse and is not breathing):    CPR (Attempt to Resuscitate)     Medical Interventions (Patient has pulse or is breathing):    Full Support       Rimma Escamilla MD  12/13/22

## 2022-12-13 NOTE — PLAN OF CARE
"Goal Outcome Evaluation:  Plan of Care Reviewed With: patient        Progress: improving   Pt is alert and oriented x4. VSS on high flow NC. Respiratory was able to wean pt down to 35% high flow this shift; Pt sating between 90-94%. NSR on tele;sinus tach at times. Pt has slept off and on throughout the shift. No c/o of pain. Pt says she \"feels a lot better\". Up with stand-by to the bathroom; voiding spontaneously. Will continue to monitor.   "

## 2022-12-14 ENCOUNTER — READMISSION MANAGEMENT (OUTPATIENT)
Dept: CALL CENTER | Facility: HOSPITAL | Age: 27
End: 2022-12-14

## 2022-12-14 VITALS
HEART RATE: 72 BPM | OXYGEN SATURATION: 94 % | SYSTOLIC BLOOD PRESSURE: 134 MMHG | DIASTOLIC BLOOD PRESSURE: 87 MMHG | RESPIRATION RATE: 16 BRPM | BODY MASS INDEX: 49.08 KG/M2 | TEMPERATURE: 98.5 F | WEIGHT: 250 LBS | HEIGHT: 60 IN

## 2022-12-14 DIAGNOSIS — R06.83 SNORING: Primary | ICD-10-CM

## 2022-12-14 DIAGNOSIS — G47.10 HYPERSOMNOLENCE: ICD-10-CM

## 2022-12-14 PROBLEM — J96.01 ACUTE RESPIRATORY FAILURE WITH HYPOXIA: Status: RESOLVED | Noted: 2019-06-08 | Resolved: 2022-12-14

## 2022-12-14 PROBLEM — J45.901 ASTHMA EXACERBATION: Status: RESOLVED | Noted: 2022-12-11 | Resolved: 2022-12-14

## 2022-12-14 PROBLEM — J45.51 SEVERE PERSISTENT ASTHMA WITH ACUTE EXACERBATION: Status: RESOLVED | Noted: 2022-12-12 | Resolved: 2022-12-14

## 2022-12-14 PROCEDURE — 99232 SBSQ HOSP IP/OBS MODERATE 35: CPT | Performed by: INTERNAL MEDICINE

## 2022-12-14 PROCEDURE — 63710000001 PREDNISONE PER 1 MG: Performed by: PEDIATRICS

## 2022-12-14 PROCEDURE — 99238 HOSP IP/OBS DSCHRG MGMT 30/<: CPT | Performed by: PEDIATRICS

## 2022-12-14 PROCEDURE — 94799 UNLISTED PULMONARY SVC/PX: CPT

## 2022-12-14 PROCEDURE — 94664 DEMO&/EVAL PT USE INHALER: CPT

## 2022-12-14 PROCEDURE — 94761 N-INVAS EAR/PLS OXIMETRY MLT: CPT

## 2022-12-14 RX ORDER — PREDNISONE 20 MG/1
40 TABLET ORAL
Qty: 8 TABLET | Refills: 0 | Status: SHIPPED | OUTPATIENT
Start: 2022-12-15 | End: 2022-12-19

## 2022-12-14 RX ADMIN — ALBUTEROL SULFATE 2.5 MG: 2.5 SOLUTION RESPIRATORY (INHALATION) at 11:24

## 2022-12-14 RX ADMIN — ALBUTEROL SULFATE 2.5 MG: 2.5 SOLUTION RESPIRATORY (INHALATION) at 14:46

## 2022-12-14 RX ADMIN — TIOTROPIUM BROMIDE INHALATION SPRAY 2 PUFF: 3.12 SPRAY, METERED RESPIRATORY (INHALATION) at 07:18

## 2022-12-14 RX ADMIN — ALBUTEROL SULFATE 2.5 MG: 2.5 SOLUTION RESPIRATORY (INHALATION) at 07:18

## 2022-12-14 RX ADMIN — ALBUTEROL SULFATE 2.5 MG: 2.5 SOLUTION RESPIRATORY (INHALATION) at 03:02

## 2022-12-14 RX ADMIN — OXYCODONE HYDROCHLORIDE AND ACETAMINOPHEN 500 MG: 500 TABLET ORAL at 09:42

## 2022-12-14 RX ADMIN — PREDNISONE 40 MG: 20 TABLET ORAL at 09:42

## 2022-12-14 RX ADMIN — BUDESONIDE AND FORMOTEROL FUMARATE DIHYDRATE 2 PUFF: 160; 4.5 AEROSOL RESPIRATORY (INHALATION) at 07:18

## 2022-12-14 RX ADMIN — PANTOPRAZOLE SODIUM 40 MG: 40 TABLET, DELAYED RELEASE ORAL at 06:13

## 2022-12-14 NOTE — PLAN OF CARE
Patient had no changes overnight. She slept well between care rounds. VSS. Tolerated 3L O2 Nasal cannula. NSR with Bradycardia on telemetry while sleeping. She is hopeful to return home soon.

## 2022-12-14 NOTE — PROGRESS NOTES
Pulmonary Hospital Follow-up     Hospital:  LOS: 2 days   Ms. Yoli Regan, 27 y.o. female is followed for:   Severe persistent asthma with acute exacerbation            History of present illness:   28 yo female with history of severe asthma (normal PFT recently) with multiple hospital admissions in last couple of years, obesity, Eczema, environmental allergies admitted with worsening cough, SOA going on since last week. She went to [facility] and was given steroids but continued to feel worse so came here for admission. States that she works as a  and gets exposed to lot of different URIs. Also has multiple allergies as well. Also has 7 yo at home and whenever she gets sick pt gets sick as well. Pt was admitted here and so far seems to be improving. Currently on HFNC.      Pt states that ever since she was placed on Trelegy inhaler she has done better and has not required many admissions.      She denies any fever/chills. Denies any chest pain. No history of thromboembolic disease. No hemoptysis. No significant sinus disease or post nasal drip. Denies significant GERD symptoms.      Does snore at night and has difficult time maintaining sleep.       Subjective   Interval History:  Overnight no acute events. Pt states that she is feeling better. She is Off Oxygen today.                  The patient's past medical, surgical and social history were reviewed and updated in Epic as appropriate.       Objective     Infusions:     Medications:  albuterol, 2.5 mg, Nebulization, Q4H - RT  ascorbic acid, 500 mg, Oral, Daily  budesonide-formoterol, 2 puff, Inhalation, BID - RT   And  tiotropium bromide monohydrate, 2 puff, Inhalation, Daily - RT  chlorhexidine, 15 mL, Mouth/Throat, BID  enoxaparin, 40 mg, Subcutaneous, Q12H  montelukast, 10 mg, Oral, Nightly  pantoprazole, 40 mg, Oral, QAM  predniSONE, 40 mg, Oral, Daily With Breakfast  sodium chloride, 10 mL, Intravenous, Q12H        Vital Sign Min/Max  for last 24 hours  Temp  Min: 97.6 °F (36.4 °C)  Max: 98.6 °F (37 °C)   BP  Min: 112/63  Max: 134/87   Pulse  Min: 56  Max: 100   Resp  Min: 16  Max: 18   SpO2  Min: 88 %  Max: 97 %   Flow (L/min)  Min: 1  Max: 4       Input/Output for last 24 hour shift  12/13 0701 - 12/14 0700  In: 720 [P.O.:720]  Out: -       Objective  General Appearance: Awake, alert, in no acute distress  Head:    No thrush, Pupils reactive & symmetrical B/L.          Lungs:   B/L Breath sounds present with decreased breath sounds on bases, no wheezing heard, no crackles.   Heart: S1 and S2 present, no murmur.  Extremities: Atraumatic, no cyanosis or clubbing,  no edema, wwarm to touch.  Neurologic:  Moving all four extremities. Good strength bilaterally.  Psychologic: Appropriate affect, Cooperative.       Results from last 7 days   Lab Units 12/12/22  0358 12/11/22  0549   WBC 10*3/mm3 12.64* 9.10   HEMOGLOBIN g/dL 12.8 13.5   PLATELETS 10*3/mm3 394 375     Results from last 7 days   Lab Units 12/12/22  0358 12/11/22  0549   SODIUM mmol/L 140 139   POTASSIUM mmol/L 4.7 3.5   CO2 mmol/L 22.0 25.0   BUN mg/dL 10 13   CREATININE mg/dL 0.46* 0.65   MAGNESIUM mg/dL  --  1.9   GLUCOSE mg/dL 159* 104*     Estimated Creatinine Clearance: 212.1 mL/min (A) (by C-G formula based on SCr of 0.46 mg/dL (L)).    Results from last 7 days   Lab Units 12/13/22  0635   PH, ARTERIAL pH units 7.458*   PCO2, ARTERIAL mm Hg 35.2   PO2 ART mm Hg 75.2*       Images:   NA    I reviewed the patient's results and images.     Assessment & Plan   Impression        Severe persistent asthma with acute exacerbation    Acute respiratory failure with hypoxia (HCC)    Morbid obesity with BMI of 40.0-44.9, adult (HCC)    GERD (gastroesophageal reflux disease)    Asthma exacerbation       Plan        1.  Patient presented here with a severe asthma exacerbation.  Clinically improved.  Continue prednisone for 4 more days.  2.  Patient can go back on her home Trelegy on  discharge.  3.  Continue antireflux measures and continue Protonix.   4.  Continue Singulair for allergies.  5.  I have ordered home sleep study for patient to be done as outpatient.  Discussed with her that it is important that we evaluate her for sleep disordered breathing and treatment of that will help her down the road.  6.  Weight loss also counseled and patient is already working on that aspect.  7.  Patient will need follow-up with pulmonary clinic in next couple of weeks.  Consideration could be given to newer Biologics for asthma control so that we do not have to deal with frequent steroid use which is likely also contributing to weight gain.    Hopefully patient can be discharged home today.  Will be available if any further questions    Time spent 25 min (exclusive of procedure time)  including high complexity decision making to assess, manipulate, and support vital organ system failure in this individual who has impairment of one or more vital organ systems such that there is a high probability of imminent or life threatening deterioration in the patient’s condition.      Rosalio Nugent MD, [facility]  Pulmonary, Critical care and Sleep Medicine

## 2022-12-14 NOTE — DISCHARGE SUMMARY
[facility] Medicine Services  DISCHARGE SUMMARY    Patient Name: Yoli Regan  : 1995  MRN: [id number]    Date of Admission: 2022  3:25 AM  Date of Discharge:  2022    Primary Care Physician: Cora Hutchinson PA-C    Consults     Date and Time Order Name Status Description    2022  7:49 AM Inpatient Pulmonology Consult Completed           Hospital Course     Presenting Problem:   Asthma exacerbation [J45.901]  Severe persistent asthma with acute exacerbation [J45.51]    Active Hospital Problems    Diagnosis  POA   • GERD (gastroesophageal reflux disease) [K21.9]  Yes   • Morbid obesity with BMI of 40.0-44.9, adult ([facility]) [E66.01, Z68.41]  Not Applicable      Resolved Hospital Problems    Diagnosis Date Resolved POA   • **Severe persistent asthma with acute exacerbation [J45.51] 2022 Yes   • Asthma exacerbation [J45.901] 2022 Yes   • Acute respiratory failure with hypoxia ([facility]) [J96.01] 2022 Yes          Hospital Course:  Yoli Regan is a 27 y.o. female with a history of severe persistent asthma, presented with an acute asthma exacerbation with acute hypoxic respiratory failure.  Per the history it seems that a viral illness most likely was her trigger.  She did require high flow nasal cannula for approximately 24 hours.  Pulmonary was consulted.  She will need outpatient follow-up to discuss possible Biologics.  She will go home on a short steroid burst.  She needs to continue her home inhalers.  Prior to discharge she was weaned to room air with improvement in her symptoms.  Recommended that she have a sleep study to look for sleep apnea.      Discharge Follow Up Recommendations for outpatient labs/diagnostics:   Sleep study    Day of Discharge     HPI:   Feeling better, on RA.  Moving more and less SOB.    Review of Systems  Gen- No fevers, chills  CV- No chest pain, palpitations  Resp- No cough, mild dyspnea  GI- No N/V/D,  abd pain        Vital Signs:   Temp:  [97.6 °F (36.4 °C)-98.5 °F (36.9 °C)] 98.5 °F (36.9 °C)  Heart Rate:  [] 76  Resp:  [16-18] 16  BP: (112-134)/(63-87) 134/87  Flow (L/min):  [1-3] 1      Physical Exam:  Constitutional: No acute distress, awake, alert, on RA, able to speak in full sentences  HENT: NCAT, mucous membranes moist  Respiratory: significantly improved air movement,without any wheezing.    Cardiovascular: RRR, no murmurs, rubs, or gallops  Gastrointestinal: Positive bowel sounds, soft, nontender, nondistended  Musculoskeletal: No bilateral ankle edema  Psychiatric: Appropriate affect, cooperative  Neurologic: Oriented x 3, strength symmetric in all extremities, Cranial Nerves grossly intact to confrontation, speech clear  Skin: No rashes    Pertinent  and/or Most Recent Results     LAB RESULTS:      Lab 12/12/22  0358 12/11/22  2213 12/11/22  1645 12/11/22  1433 12/11/22  1037 12/11/22  0825 12/11/22  0549   WBC 12.64*  --   --   --   --   --  9.10   HEMOGLOBIN 12.8  --   --   --   --   --  13.5   HEMATOCRIT 42.5  --   --   --   --   --  44.8   PLATELETS 394  --   --   --   --   --  375   NEUTROS ABS  --   --   --   --   --   --  4.67   IMMATURE GRANS (ABS)  --   --   --   --   --   --  0.03   LYMPHS ABS  --   --   --   --   --   --  3.11*   MONOS ABS  --   --   --   --   --   --  0.72   EOS ABS  --   --   --   --   --   --  0.43*   MCV 76.0*  --   --   --   --   --  75.2*   PROCALCITONIN  --   --   --   --   --   --  0.03   LACTATE 1.1 3.0* 2.4* 4.8* 2.1*   < >  --    D DIMER QUANT  --   --   --   --   --   --  <0.27    < > = values in this interval not displayed.         Lab 12/12/22  0358 12/11/22  0549   SODIUM 140 139   POTASSIUM 4.7 3.5   CHLORIDE 109* 105   CO2 22.0 25.0   ANION GAP 9.0 9.0   BUN 10 13   CREATININE 0.46* 0.65   EGFR 135.5 124.7   GLUCOSE 159* 104*   CALCIUM 9.0 9.4   MAGNESIUM  --  1.9   HEMOGLOBIN A1C 5.20  --          Lab 12/12/22  0358 12/11/22  0549   TOTAL PROTEIN 6.2  6.8   ALBUMIN 3.90 4.20   GLOBULIN 2.3 2.6   ALT (SGPT) 22 25   AST (SGOT) 14 23   BILIRUBIN 0.6 0.3   ALK PHOS 55 52         Lab 12/11/22  0549   PROBNP 9.4   TROPONIN T <0.010                 Lab 12/13/22  0635 12/11/22  0557   PH, ARTERIAL 7.458*  --    PCO2, ARTERIAL 35.2  --    PO2 ART 75.2*  --    FIO2 44 21   HCO3 ART 24.9  --    BASE EXCESS ART 1.4  --    CARBOXYHEMOGLOBIN 0.6  --    CARBOXYHEMOGLOBIN (VENOUS)  --  0.8     Brief Urine Lab Results  (Last result in the past 365 days)      Color   Clarity   Blood   Leuk Est   Nitrite   Protein   CREAT   Urine HCG        04/21/22 0908               Negative           Microbiology Results (last 10 days)     Procedure Component Value - Date/Time    Blood Culture - Blood, Arm, Right [[id number]]  (Normal) Collected: 12/11/22 0833    Lab Status: Preliminary result Specimen: Blood from Arm, Right Updated: 12/14/22 0915     Blood Culture No growth at 3 days    Blood Culture - Blood, Arm, Left [[id number]]  (Normal) Collected: 12/11/22 0825    Lab Status: Preliminary result Specimen: Blood from Arm, Left Updated: 12/14/22 0845     Blood Culture No growth at 3 days    COVID PRE-OP / PRE-PROCEDURE SCREENING ORDER (NO ISOLATION) - Swab, Nasopharynx [[id number]]  (Normal) Collected: 12/11/22 0553    Lab Status: Final result Specimen: Swab from Nasopharynx Updated: 12/11/22 0659    Narrative:      The following orders were created for panel order COVID PRE-OP / PRE-PROCEDURE SCREENING ORDER (NO ISOLATION) - Swab, Nasopharynx.  Procedure                               Abnormality         Status                     ---------                               -----------         ------                     Respiratory Panel PCR w/...[[id number]]  Normal              Final result                 Please view results for these tests on the individual orders.    Respiratory Panel PCR w/COVID-19(SARS-CoV-2) CYNTHIA/STEPHANE/DESTINEY/PAD/COR/MAD/CARRIE In-House, NP Swab in UTM/VTM, 3-4 HR TAT - Swab,  Nasopharynx [[id number]]  (Normal) Collected: 12/11/22 0553    Lab Status: Final result Specimen: Swab from Nasopharynx Updated: 12/11/22 0659     ADENOVIRUS, PCR Not Detected     Coronavirus 229E Not Detected     Coronavirus HKU1 Not Detected     Coronavirus NL63 Not Detected     Coronavirus OC43 Not Detected     COVID19 Not Detected     Human Metapneumovirus Not Detected     Human Rhinovirus/Enterovirus Not Detected     Influenza A PCR Not Detected     Influenza B PCR Not Detected     Parainfluenza Virus 1 Not Detected     Parainfluenza Virus 2 Not Detected     Parainfluenza Virus 3 Not Detected     Parainfluenza Virus 4 Not Detected     RSV, PCR Not Detected     Bordetella pertussis pcr Not Detected     Bordetella parapertussis PCR Not Detected     Chlamydophila pneumoniae PCR Not Detected     Mycoplasma pneumo by PCR Not Detected    Narrative:      In the setting of a positive respiratory panel with a viral infection PLUS a negative procalcitonin without other underlying concern for bacterial infection, consider observing off antibiotics or discontinuation of antibiotics and continue supportive care. If the respiratory panel is positive for atypical bacterial infection (Bordetella pertussis, Chlamydophila pneumoniae, or Mycoplasma pneumoniae), consider antibiotic de-escalation to target atypical bacterial infection.          XR Chest 1 View    Result Date: 12/11/2022  EXAMINATION: Chest one DATE: 12/11/2022. COMPARISON: 4/21/2022. CLINICAL HISTORY: Persistent asthma exacerbation.     There is likely some bronchial wall thickening related to inflammatory airway changes. There is no focal consolidation otherwise seen. The cardiomediastinal silhouette and the pulmonary vessels are within normal limits. Electronically signed by:  Joaquin Peterson D.O.  12/11/2022 4:21 AM Mountain Time    XR Chest 1 View    Result Date: 12/9/2022  PORTABLE CHEST    12/9/2022 3:10 AM HISTORY: Shortness of breath, asthma. COMPARISON:  November 10, 2021. FINDINGS: The heart is proper size. The mediastinum is unremarkable. The lung bases are hypoaerated. There is probable atelectasis bilaterally. Developing pneumonia is not totally excluded. There is no effusion or edema.     Hypoaeration and probable atelectasis bilaterally. Developing pneumonia is not excluded. Images reviewed, interpreted, and dictated by Dr. Gabe Chamorro. Transcribed by JAMES Krishna(ASHER).                  Plan for Follow-up of Pending Labs/Results: will call with abnormal results.  Pending Labs     Order Current Status    Blood Culture - Blood, Arm, Left Preliminary result    Blood Culture - Blood, Arm, Right Preliminary result        Discharge Details        Discharge Medications      New Medications      Instructions Start Date   predniSONE 20 MG tablet  Commonly known as: DELTASONE   40 mg, Oral, Daily With Breakfast   Start Date: December 15, 2022        Continue These Medications      Instructions Start Date   acetaminophen 325 MG tablet  Commonly known as: TYLENOL   650 mg, Oral, Every 4 Hours PRN      albuterol sulfate  (90 Base) MCG/ACT inhaler  Commonly known as: PROVENTIL HFA;VENTOLIN HFA;PROAIR HFA   2 puffs, Inhalation, Every 4 Hours PRN      albuterol (2.5 MG/3ML) 0.083% nebulizer solution  Commonly known as: PROVENTIL   Inhale 1 vial VIA nebulizer every 4 hours as needed for wheezing      chlorhexidine 0.12 % solution  Commonly known as: Peridex   15 mL, Mouth/Throat, 2 Times Daily      Fluticasone-Umeclidin-Vilant 200-62.5-25 MCG/INH inhaler  Commonly known as: TRELEGY   1 puff, Inhalation, Daily - RT      ipratropium-albuterol 0.5-2.5 mg/3 ml nebulizer  Commonly known as: DUO-NEB   3 mL, Nebulization, Every 4 Hours      montelukast 10 MG tablet  Commonly known as: SINGULAIR   10 mg, Oral, Nightly      omeprazole OTC 20 MG EC tablet  Commonly known as: PriLOSEC OTC   Take 1 tablet by mouth Daily As Needed (reflux symptoms). Take ONLY WHEN ON  STEROIDS      Vitamin C 500 MG capsule   1 capsule, Oral, Daily             No Known Allergies      Discharge Disposition:  Home or Self Care    Diet:  Hospital:  Diet Order   Procedures   • Diet: Regular/House Diet; Texture: Regular Texture (IDDSI 7); Fluid Consistency: Thin (IDDSI 0)       Activity:      Restrictions or Other Recommendations:  As tolerated.       CODE STATUS:    Code Status and Medical Interventions:   Ordered at: 12/11/22 0953     Level Of Support Discussed With:    Patient     Code Status (Patient has no pulse and is not breathing):    CPR (Attempt to Resuscitate)     Medical Interventions (Patient has pulse or is breathing):    Full Support       No future appointments.              Rimma Escamilla MD  12/14/22      Time Spent on Discharge:  I spent  20  minutes on this discharge activity which included: face-to-face encounter with the patient, reviewing the data in the system, coordination of the care with the nursing staff as well as consultants, documentation, and entering orders.

## 2022-12-14 NOTE — DISCHARGE INSTRUCTIONS
Take all medications as prescribed.  Resume home medications.  Follow up with pulmonology in 2-3 weeks.

## 2022-12-15 NOTE — OUTREACH NOTE
Prep Survey    Flowsheet Row Responses   [facility] facility patient discharged from? [place]   Is LACE score < 7 ? No   Eligibility Readm Mgmt   Discharge diagnosis Severe persistent asthma with acute exacerbation   Does the patient have one of the following disease processes/diagnoses(primary or secondary)? Other   Does the patient have Home health ordered? No   Is there a DME ordered? No   Prep survey completed? Yes          BRYNN MENDEZ - Registered Nurse

## 2022-12-16 LAB
BACTERIA SPEC AEROBE CULT: NORMAL
BACTERIA SPEC AEROBE CULT: NORMAL

## 2022-12-20 ENCOUNTER — HOSPITAL ENCOUNTER (EMERGENCY)
Facility: HOSPITAL | Age: 27
Discharge: HOME | End: 2022-12-21
Attending: EMERGENCY MEDICINE | Admitting: EMERGENCY MEDICINE

## 2022-12-20 ENCOUNTER — APPOINTMENT (OUTPATIENT)
Dept: CT IMAGING | Facility: HOSPITAL | Age: 27
End: 2022-12-20

## 2022-12-20 ENCOUNTER — APPOINTMENT (OUTPATIENT)
Dept: GENERAL RADIOLOGY | Facility: HOSPITAL | Age: 27
End: 2022-12-20

## 2022-12-20 VITALS
TEMPERATURE: 99.1 F | BODY MASS INDEX: 51.04 KG/M2 | SYSTOLIC BLOOD PRESSURE: 124 MMHG | DIASTOLIC BLOOD PRESSURE: 76 MMHG | RESPIRATION RATE: 22 BRPM | OXYGEN SATURATION: 90 % | WEIGHT: 260 LBS | HEART RATE: 99 BPM | HEIGHT: 60 IN

## 2022-12-20 DIAGNOSIS — J45.909 ASTHMA, ACUTE: ICD-10-CM

## 2022-12-20 DIAGNOSIS — J12.0: ICD-10-CM

## 2022-12-20 DIAGNOSIS — J18.9 PNEUMONITIS: Primary | ICD-10-CM

## 2022-12-20 LAB
ALBUMIN SERPL-MCNC: 4 G/DL (ref 3.5–5.2)
ALBUMIN/GLOB SERPL: 1.4 G/DL
ALP SERPL-CCNC: 63 U/L (ref 39–117)
ALT SERPL W P-5'-P-CCNC: 35 U/L (ref 1–33)
ANION GAP SERPL CALCULATED.3IONS-SCNC: 13 MMOL/L (ref 5–15)
AST SERPL-CCNC: 27 U/L (ref 1–32)
ATMOSPHERIC PRESS: NORMAL MM[HG]
B PARAPERT DNA SPEC QL NAA+PROBE: NOT DETECTED
B PERT DNA SPEC QL NAA+PROBE: NOT DETECTED
BASE EXCESS BLDV CALC-SCNC: 2 MMOL/L (ref -2–2)
BASOPHILS # BLD AUTO: 0.04 10*3/MM3 (ref 0–0.2)
BASOPHILS NFR BLD AUTO: 0.3 % (ref 0–1.5)
BDY SITE: NORMAL
BILIRUB SERPL-MCNC: 0.3 MG/DL (ref 0–1.2)
BODY TEMPERATURE: 37 C
BUN SERPL-MCNC: 10 MG/DL (ref 6–20)
BUN/CREAT SERPL: 15.9 (ref 7–25)
C PNEUM DNA NPH QL NAA+NON-PROBE: NOT DETECTED
CALCIUM SPEC-SCNC: 8.6 MG/DL (ref 8.6–10.5)
CHLORIDE SERPL-SCNC: 100 MMOL/L (ref 98–107)
CO2 BLDA-SCNC: 28.6 MMOL/L (ref 22–33)
CO2 SERPL-SCNC: 24 MMOL/L (ref 22–29)
COHGB MFR BLD: 0.6 %
CREAT SERPL-MCNC: 0.63 MG/DL (ref 0.57–1)
D-LACTATE SERPL-SCNC: 2.5 MMOL/L (ref 0.5–2)
DEPRECATED RDW RBC AUTO: 42.5 FL (ref 37–54)
EGFRCR SERPLBLD CKD-EPI 2021: 125.7 ML/MIN/1.73
EOSINOPHIL # BLD AUTO: 0.39 10*3/MM3 (ref 0–0.4)
EOSINOPHIL NFR BLD AUTO: 2.7 % (ref 0.3–6.2)
EPAP: 0
ERYTHROCYTE [DISTWIDTH] IN BLOOD BY AUTOMATED COUNT: 16.8 % (ref 12.3–15.4)
FLUAV SUBTYP SPEC NAA+PROBE: NOT DETECTED
FLUBV RNA ISLT QL NAA+PROBE: NOT DETECTED
GLOBULIN UR ELPH-MCNC: 2.8 GM/DL
GLUCOSE SERPL-MCNC: 158 MG/DL (ref 65–99)
HADV DNA SPEC NAA+PROBE: DETECTED
HCO3 BLDV-SCNC: 27.3 MMOL/L (ref 22–28)
HCOV 229E RNA SPEC QL NAA+PROBE: NOT DETECTED
HCOV HKU1 RNA SPEC QL NAA+PROBE: NOT DETECTED
HCOV NL63 RNA SPEC QL NAA+PROBE: NOT DETECTED
HCOV OC43 RNA SPEC QL NAA+PROBE: NOT DETECTED
HCT VFR BLD AUTO: 46.7 % (ref 34–46.6)
HGB BLD-MCNC: 14.4 G/DL (ref 12–15.9)
HGB BLDA-MCNC: 14.4 G/DL (ref 14–18)
HMPV RNA NPH QL NAA+NON-PROBE: NOT DETECTED
HOLD SPECIMEN: NORMAL
HOLD SPECIMEN: NORMAL
HPIV1 RNA ISLT QL NAA+PROBE: NOT DETECTED
HPIV2 RNA SPEC QL NAA+PROBE: NOT DETECTED
HPIV3 RNA NPH QL NAA+PROBE: NOT DETECTED
HPIV4 P GENE NPH QL NAA+PROBE: NOT DETECTED
IMM GRANULOCYTES # BLD AUTO: 0.23 10*3/MM3 (ref 0–0.05)
IMM GRANULOCYTES NFR BLD AUTO: 1.6 % (ref 0–0.5)
INHALED O2 CONCENTRATION: 80 %
IPAP: 0
LYMPHOCYTES # BLD AUTO: 4.98 10*3/MM3 (ref 0.7–3.1)
LYMPHOCYTES NFR BLD AUTO: 35 % (ref 19.6–45.3)
M PNEUMO IGG SER IA-ACNC: NOT DETECTED
MCH RBC QN AUTO: 23.2 PG (ref 26.6–33)
MCHC RBC AUTO-ENTMCNC: 30.8 G/DL (ref 31.5–35.7)
MCV RBC AUTO: 75.2 FL (ref 79–97)
METHGB BLD QL: 0.4 %
MODALITY: NORMAL
MONOCYTES # BLD AUTO: 0.74 10*3/MM3 (ref 0.1–0.9)
MONOCYTES NFR BLD AUTO: 5.2 % (ref 5–12)
NEUTROPHILS NFR BLD AUTO: 55.2 % (ref 42.7–76)
NEUTROPHILS NFR BLD AUTO: 7.84 10*3/MM3 (ref 1.7–7)
NOTE: NORMAL
NRBC BLD AUTO-RTO: 0 /100 WBC (ref 0–0.2)
NT-PROBNP SERPL-MCNC: 27.9 PG/ML (ref 0–450)
OXYHGB MFR BLDV: 80.1 %
PAW @ PEAK INSP FLOW SETTING VENT: 0 CMH2O
PCO2 BLDV: 43.8 MM HG (ref 41–51)
PH BLDV: 7.4 PH UNITS (ref 7.31–7.41)
PLATELET # BLD AUTO: 431 10*3/MM3 (ref 140–450)
PMV BLD AUTO: 10.9 FL (ref 6–12)
PO2 BLDV: 44.3 MM HG (ref 27–53)
POTASSIUM SERPL-SCNC: 3.9 MMOL/L (ref 3.5–5.2)
PROT SERPL-MCNC: 6.8 G/DL (ref 6–8.5)
RBC # BLD AUTO: 6.21 10*6/MM3 (ref 3.77–5.28)
RHINOVIRUS RNA SPEC NAA+PROBE: NOT DETECTED
RSV RNA NPH QL NAA+NON-PROBE: NOT DETECTED
S PYO AG THROAT QL: NEGATIVE
SARS-COV-2 RNA NPH QL NAA+NON-PROBE: NOT DETECTED
SODIUM SERPL-SCNC: 137 MMOL/L (ref 136–145)
TOTAL RATE: 0 BREATHS/MINUTE
TROPONIN T SERPL-MCNC: <0.01 NG/ML (ref 0–0.03)
WBC NRBC COR # BLD: 14.22 10*3/MM3 (ref 3.4–10.8)
WHOLE BLOOD HOLD COAG: NORMAL
WHOLE BLOOD HOLD SPECIMEN: NORMAL

## 2022-12-20 PROCEDURE — 87040 BLOOD CULTURE FOR BACTERIA: CPT | Performed by: EMERGENCY MEDICINE

## 2022-12-20 PROCEDURE — 93005 ELECTROCARDIOGRAM TRACING: CPT | Performed by: EMERGENCY MEDICINE

## 2022-12-20 PROCEDURE — 93005 ELECTROCARDIOGRAM TRACING: CPT

## 2022-12-20 PROCEDURE — 0 IOPAMIDOL PER 1 ML: Performed by: EMERGENCY MEDICINE

## 2022-12-20 PROCEDURE — 71275 CT ANGIOGRAPHY CHEST: CPT

## 2022-12-20 PROCEDURE — 84484 ASSAY OF TROPONIN QUANT: CPT | Performed by: EMERGENCY MEDICINE

## 2022-12-20 PROCEDURE — 94640 AIRWAY INHALATION TREATMENT: CPT

## 2022-12-20 PROCEDURE — 80053 COMPREHEN METABOLIC PANEL: CPT | Performed by: EMERGENCY MEDICINE

## 2022-12-20 PROCEDURE — 94799 UNLISTED PULMONARY SVC/PX: CPT

## 2022-12-20 PROCEDURE — 85025 COMPLETE CBC W/AUTO DIFF WBC: CPT | Performed by: EMERGENCY MEDICINE

## 2022-12-20 PROCEDURE — 82805 BLOOD GASES W/O2 SATURATION: CPT

## 2022-12-20 PROCEDURE — 96374 THER/PROPH/DIAG INJ IV PUSH: CPT

## 2022-12-20 PROCEDURE — 87880 STREP A ASSAY W/OPTIC: CPT | Performed by: EMERGENCY MEDICINE

## 2022-12-20 PROCEDURE — 87081 CULTURE SCREEN ONLY: CPT | Performed by: EMERGENCY MEDICINE

## 2022-12-20 PROCEDURE — 71045 X-RAY EXAM CHEST 1 VIEW: CPT

## 2022-12-20 PROCEDURE — 0202U NFCT DS 22 TRGT SARS-COV-2: CPT | Performed by: EMERGENCY MEDICINE

## 2022-12-20 PROCEDURE — 25010000002 METHYLPREDNISOLONE PER 125 MG: Performed by: EMERGENCY MEDICINE

## 2022-12-20 PROCEDURE — 83605 ASSAY OF LACTIC ACID: CPT | Performed by: EMERGENCY MEDICINE

## 2022-12-20 PROCEDURE — 99285 EMERGENCY DEPT VISIT HI MDM: CPT

## 2022-12-20 PROCEDURE — 36415 COLL VENOUS BLD VENIPUNCTURE: CPT

## 2022-12-20 PROCEDURE — 83880 ASSAY OF NATRIURETIC PEPTIDE: CPT | Performed by: EMERGENCY MEDICINE

## 2022-12-20 RX ORDER — SODIUM CHLORIDE 0.9 % (FLUSH) 0.9 %
10 SYRINGE (ML) INJECTION AS NEEDED
Status: DISCONTINUED | OUTPATIENT
Start: 2022-12-20 | End: 2022-12-21 | Stop reason: HOSPADM

## 2022-12-20 RX ORDER — METHYLPREDNISOLONE SODIUM SUCCINATE 125 MG/2ML
125 INJECTION, POWDER, LYOPHILIZED, FOR SOLUTION INTRAMUSCULAR; INTRAVENOUS ONCE
Status: COMPLETED | OUTPATIENT
Start: 2022-12-20 | End: 2022-12-20

## 2022-12-20 RX ORDER — ALBUTEROL SULFATE 2.5 MG/3ML
2.5 SOLUTION RESPIRATORY (INHALATION)
Status: COMPLETED | OUTPATIENT
Start: 2022-12-20 | End: 2022-12-20

## 2022-12-20 RX ADMIN — IOPAMIDOL 55 ML: 755 INJECTION, SOLUTION INTRAVENOUS at 22:23

## 2022-12-20 RX ADMIN — METHYLPREDNISOLONE SODIUM SUCCINATE 125 MG: 125 INJECTION, POWDER, FOR SOLUTION INTRAMUSCULAR; INTRAVENOUS at 21:57

## 2022-12-20 RX ADMIN — ALBUTEROL SULFATE 2.5 MG: 2.5 SOLUTION RESPIRATORY (INHALATION) at 21:46

## 2022-12-20 RX ADMIN — ALBUTEROL SULFATE 2.5 MG: 2.5 SOLUTION RESPIRATORY (INHALATION) at 21:53

## 2022-12-21 DIAGNOSIS — J45.50 SEVERE PERSISTENT ASTHMA WITHOUT COMPLICATION: ICD-10-CM

## 2022-12-21 LAB
HOLD SPECIMEN: NORMAL
QT INTERVAL: 334 MS
QTC INTERVAL: 458 MS

## 2022-12-21 RX ORDER — PREDNISONE 10 MG/1
10 TABLET ORAL DAILY
Qty: 18 TABLET | Refills: 0 | Status: SHIPPED | OUTPATIENT
Start: 2022-12-21

## 2022-12-21 RX ORDER — MONTELUKAST SODIUM 10 MG/1
TABLET ORAL
Qty: 90 TABLET | Refills: 1 | Status: SHIPPED | OUTPATIENT
Start: 2022-12-21

## 2022-12-21 RX ORDER — AZITHROMYCIN 250 MG/1
TABLET, FILM COATED ORAL
Qty: 6 TABLET | Refills: 0 | Status: SHIPPED | OUTPATIENT
Start: 2022-12-21 | End: 2023-01-05

## 2022-12-21 NOTE — ED PROVIDER NOTES
EMERGENCY DEPARTMENT ENCOUNTER    Pt Name: Yoli Regan  MRN: [id number]  Pt :   1995  Room Number:    Date of encounter:  2022  PCP: Cora Hutchinson PA-C  ED Provider: Trevor Ledezma MD    Historian: Patient      HPI:  Chief Complaint: Shortness of        Context: Yoli Regan is a 27 y.o. female who presents to the ED c/o abrupt onset of shortness of breath earlier tonight with a history of asthma.  She does have a history of severe asthma, with recent relatively better control using advanced corticosteroids and neb treatments with her primary care physician, however she did have a recent hospital admission, complains of asthma, with shortness of breath, today of acute nature, with treatment by EMS prior to arrival since here she has had continued wheezing and shortness of breath, as well as the development of a diffuse itchy hives rash.  She has no known allergic exposures no tongue or airway swelling at this time.      PAST MEDICAL HISTORY  Past Medical History:   Diagnosis Date   • Allergic    • Asthma    • GERD (gastroesophageal reflux disease)    • Obesity    • Pneumonia 2017   • Sleep apnea, obstructive     not diagnosed but drs have noticed during hospital stays         PAST SURGICAL HISTORY  Past Surgical History:   Procedure Laterality Date   • CHOLECYSTECTOMY           FAMILY HISTORY  Family History   Problem Relation Age of Onset   • Asthma Mother    • Sleep apnea Mother    • Diabetes type II Father    • Coronary artery disease Maternal Grandmother    • Heart attack Maternal Grandmother    • Asthma Paternal Grandmother    • Hypertension Paternal Grandmother          SOCIAL HISTORY  Social History     Socioeconomic History   • Marital status: Single   Tobacco Use   • Smoking status: Never   • Smokeless tobacco: Never   Substance and Sexual Activity   • Alcohol use: No   • Drug use: No   • Sexual activity: Yes     Partners: Male     Birth  control/protection: None         ALLERGIES  Patient has no known allergies.        REVIEW OF SYSTEMS  Review of Systems       Constitutional: Negative. No fever, no weakness.   HENT: Negative for sneezing and sore throat.    Respiratory: Negative for cough. Negative for shortness of breath.    Cardiovascular: Negative.  Negative for chest pain.   Gastrointestinal: Negative.  Negative for abdominal pain.   Genitourinary: Negative.  Negative for difficulty urinating.     All systems reviewwed and negative except as noted in HPI.      PHYSICAL EXAM    I have reviewed the triage vital signs and nursing notes.    ED Triage Vitals [12/20/22 2103]   Temp Heart Rate Resp BP SpO2   -- 115 (!) 34 149/90 (!) 88 %      Temp src Heart Rate Source Patient Position BP Location FiO2 (%)   -- Monitor Sitting Right arm --       Physical Exam  GENERAL:   Appears moderate distress  HENT: Nares patent.  EYES: No scleral icterus.  CV: Regular rhythm, regular rate.  RESPIRATORY: Wheezing present bilaterally, with moderate respiratory distress   ABDOMEN: Soft, nontender  MUSCULOSKELETAL: No deformities.   NEURO: Alert, moves all extremities, follows commands.  SKIN: Diffuse raised hives type rash on her trunk and upper extremities        LAB RESULTS  Recent Results (from the past 24 hour(s))   ECG 12 Lead ED Triage Standing Order; SOA    Collection Time: 12/20/22  9:06 PM   Result Value Ref Range    QT Interval 334 ms    QTC Interval 458 ms   Comprehensive Metabolic Panel    Collection Time: 12/20/22  9:53 PM    Specimen: Blood   Result Value Ref Range    Glucose 158 (H) 65 - 99 mg/dL    BUN 10 6 - 20 mg/dL    Creatinine 0.63 0.57 - 1.00 mg/dL    Sodium 137 136 - 145 mmol/L    Potassium 3.9 3.5 - 5.2 mmol/L    Chloride 100 98 - 107 mmol/L    CO2 24.0 22.0 - 29.0 mmol/L    Calcium 8.6 8.6 - 10.5 mg/dL    Total Protein 6.8 6.0 - 8.5 g/dL    Albumin 4.00 3.50 - 5.20 g/dL    ALT (SGPT) 35 (H) 1 - 33 U/L    AST (SGOT) 27 1 - 32 U/L    Alkaline  Phosphatase 63 39 - 117 U/L    Total Bilirubin 0.3 0.0 - 1.2 mg/dL    Globulin 2.8 gm/dL    A/G Ratio 1.4 g/dL    BUN/Creatinine Ratio 15.9 7.0 - 25.0    Anion Gap 13.0 5.0 - 15.0 mmol/L    eGFR 125.7 >60.0 mL/min/1.73   BNP    Collection Time: 12/20/22  9:53 PM    Specimen: Blood   Result Value Ref Range    proBNP 27.9 0.0 - 450.0 pg/mL   Green Top (Gel)    Collection Time: 12/20/22  9:53 PM   Result Value Ref Range    Extra Tube Hold for add-ons.    Lavender Top    Collection Time: 12/20/22  9:53 PM   Result Value Ref Range    Extra Tube hold for add-on    Gold Top - SST    Collection Time: 12/20/22  9:53 PM   Result Value Ref Range    Extra Tube Hold for add-ons.    Light Blue Top    Collection Time: 12/20/22  9:53 PM   Result Value Ref Range    Extra Tube Hold for add-ons.    CBC Auto Differential    Collection Time: 12/20/22  9:53 PM    Specimen: Blood   Result Value Ref Range    WBC 14.22 (H) 3.40 - 10.80 10*3/mm3    RBC 6.21 (H) 3.77 - 5.28 10*6/mm3    Hemoglobin 14.4 12.0 - 15.9 g/dL    Hematocrit 46.7 (H) 34.0 - 46.6 %    MCV 75.2 (L) 79.0 - 97.0 fL    MCH 23.2 (L) 26.6 - 33.0 pg    MCHC 30.8 (L) 31.5 - 35.7 g/dL    RDW 16.8 (H) 12.3 - 15.4 %    RDW-SD 42.5 37.0 - 54.0 fl    MPV 10.9 6.0 - 12.0 fL    Platelets 431 140 - 450 10*3/mm3    Neutrophil % 55.2 42.7 - 76.0 %    Lymphocyte % 35.0 19.6 - 45.3 %    Monocyte % 5.2 5.0 - 12.0 %    Eosinophil % 2.7 0.3 - 6.2 %    Basophil % 0.3 0.0 - 1.5 %    Immature Grans % 1.6 (H) 0.0 - 0.5 %    Neutrophils, Absolute 7.84 (H) 1.70 - 7.00 10*3/mm3    Lymphocytes, Absolute 4.98 (H) 0.70 - 3.10 10*3/mm3    Monocytes, Absolute 0.74 0.10 - 0.90 10*3/mm3    Eosinophils, Absolute 0.39 0.00 - 0.40 10*3/mm3    Basophils, Absolute 0.04 0.00 - 0.20 10*3/mm3    Immature Grans, Absolute 0.23 (H) 0.00 - 0.05 10*3/mm3    nRBC 0.0 0.0 - 0.2 /100 WBC   Lactic Acid, Plasma    Collection Time: 12/20/22  9:53 PM    Specimen: Blood   Result Value Ref Range    Lactate 2.5 (C) 0.5 - 2.0  mmol/L   Blood Gas, Venous With Co-Ox    Collection Time: 12/20/22  9:58 PM    Specimen: Venous Blood   Result Value Ref Range    Site Nurse/Dr Draw     pH, Venous 7.402 7.310 - 7.410 pH Units    pCO2, Venous 43.8 41.0 - 51.0 mm Hg    pO2, Venous 44.3 27.0 - 53.0 mm Hg    HCO3, Venous 27.3 22.0 - 28.0 mmol/L    Base Excess, Venous 2.0 -2.0 - 2.0 mmol/L    Hemoglobin, Blood Gas 14.4 14 - 18 g/dL    Oxyhemoglobin Venous 80.1 %    Methemoglobin Venous 0.4 %    Carboxyhemoglobin Venous 0.6 %    CO2 Content 28.6 22 - 33 mmol/L    Temperature 37.0 C    Barometric Pressure for Blood Gas      Modality PRB     FIO2 80 %    Rate 0 Breaths/minute    PIP 0 cmH2O    IPAP 0     EPAP 0     Note     Respiratory Panel PCR w/COVID-19(SARS-CoV-2) CYNTHIA/STEPHANE/DESTINEY/PAD/COR/MAD/CARRIE In-House, NP Swab in [facility], 3-4 HR TAT - Swab, Nasopharynx    Collection Time: 12/20/22 10:15 PM    Specimen: Nasopharynx; Swab   Result Value Ref Range    ADENOVIRUS, PCR Detected (A) Not Detected    Coronavirus 229E Not Detected Not Detected    Coronavirus HKU1 Not Detected Not Detected    Coronavirus NL63 Not Detected Not Detected    Coronavirus OC43 Not Detected Not Detected    COVID19 Not Detected Not Detected - Ref. Range    Human Metapneumovirus Not Detected Not Detected    Human Rhinovirus/Enterovirus Not Detected Not Detected    Influenza A PCR Not Detected Not Detected    Influenza B PCR Not Detected Not Detected    Parainfluenza Virus 1 Not Detected Not Detected    Parainfluenza Virus 2 Not Detected Not Detected    Parainfluenza Virus 3 Not Detected Not Detected    Parainfluenza Virus 4 Not Detected Not Detected    RSV, PCR Not Detected Not Detected    Bordetella pertussis pcr Not Detected Not Detected    Bordetella parapertussis PCR Not Detected Not Detected    Chlamydophila pneumoniae PCR Not Detected Not Detected    Mycoplasma pneumo by PCR Not Detected Not Detected   Rapid Strep A Screen - Swab, Throat    Collection Time: 12/20/22 10:15 PM     Specimen: Throat; Swab   Result Value Ref Range    Strep A Ag Negative Negative   Troponin    Collection Time: 12/20/22 11:07 PM    Specimen: Blood   Result Value Ref Range    Troponin T <0.010 0.000 - 0.030 ng/mL       If labs were ordered, I independently reviewed the results.        RADIOLOGY  XR Chest 1 View    Result Date: 12/20/2022  DATE OF EXAM: 12/20/2022 9:15 PM  PROCEDURE: XR CHEST 1 VW-  INDICATIONS: SOA triage protocol  COMPARISON: 12/11/2022  TECHNIQUE: Single radiographic AP view of the chest was obtained.  FINDINGS: No focal airspace opacity. No pleural effusion or pneumothorax. Normal heart and mediastinal contours.      No evidence of acute disease in the chest.  This report was finalized on 12/20/2022 10:15 PM by Kip Duenas.      CT Angiogram Chest Pulmonary Embolism    Result Date: 12/20/2022  DATE OF EXAM: 12/20/2022 10:03 PM  PROCEDURE: CT ANGIOGRAM CHEST PULMONARY EMBOLISM-  INDICATIONS: Pulmonary embolism (PE) suspected, high prob  COMPARISON: 4/22/2022  TECHNIQUE: Contiguous axial imaging was obtained from the thoracic inlet through the upper abdomen following the intravenous administration of 55 mL of Isovue 370. Reconstructed coronal and sagittal images were also obtained. Automated exposure control and iterative reconstruction methods were used.  The radiation dose reduction device was turned on for each scan per the ALARA (As Low as Reasonably Achievable) protocol.  FINDINGS: There is no pathologic axillary adenopathy or other worrisome body wall soft tissue finding in the chest. No acute findings are present in the partially characterized upper abdomen. There is no pleural or pericardial effusion. There is no pathologic mediastinal adenopathy. Mildly atherosclerotic, nonaneurysmal thoracic aorta. The pulmonary arteries are well-opacified and without evidence of focal filling defect concerning for acute pulmonary embolus. Evaluation of the osseous structures demonstrates no  evidence of acute fracture or aggressive osseous lesion, with multilevel thoracic spondylosis change present. Evaluation of the lung fields demonstrates some faint patchy bilateral areas of groundglass opacity, concerning for pneumonia, somewhat indeterminate for Covid related pneumonia.      No acute pulmonary embolus.  Evaluation of the lung fields demonstrates some faint patchy bilateral areas of groundglass opacity, concerning for pneumonia, somewhat indeterminate for Covid related pneumonia.  This report was finalized on 12/20/2022 11:06 PM by Kip Duenas.        I ordered and reviewed the above noted radiographic studies.      I viewed images of chest x-ray which showed no acute disease per my independent interpretation.    See radiologist's dictation for official interpretation.        PROCEDURES    Procedures    ECG 12 Lead ED Triage Standing Order; SOA   Preliminary Result   Test Reason : ED Triage Standing Order~   Blood Pressure :   */*   mmHG   Vent. Rate : 113 BPM     Atrial Rate : 113 BPM      P-R Int : 154 ms          QRS Dur :  92 ms       QT Int : 334 ms       P-R-T Axes :  73  89  15 degrees      QTc Int : 458 ms      Sinus tachycardia   Otherwise normal ECG   When compared with ECG of 11-DEC-2022 08:11,   Nonspecific T wave abnormality no longer evident in Anterior leads      Referred By: ED MD           Confirmed By:           MEDICATIONS GIVEN IN ER    Medications   sodium chloride 0.9 % flush 10 mL (has no administration in time range)   methylPREDNISolone sodium succinate (SOLU-Medrol) injection 125 mg (125 mg Intravenous Given 12/20/22 2157)   albuterol (PROVENTIL) nebulizer solution 0.083% 2.5 mg/3mL (2.5 mg Nebulization Given 12/20/22 2153)   iopamidol (ISOVUE-370) 76 % injection 100 mL (55 mL Intravenous Given 12/20/22 2223)         PROGRESS, DATA ANALYSIS, CONSULTS, AND MEDICAL DECISION MAKING    27-year-old female with prior asthma with good control with inhaled corticosteroids, but  recent illness presenting with significant wheezing and hypoxia.  Tight wheezing on initial auscultation    CT scan to rule out pulmonary embolism after recent hospitalization    Observation after neb treatments improved her room air pulse oximetry to 90 to 95%.  After 3 and half hours she is satting 95 to 97% on room air and says she is feeling well.  All labs have been independently reviewed by me.  All radiology studies have been reviewed by me and the radiologist dictating the report.   EKG's have been independently viewed and interpreted by me.      Differential diagnoses: Pulmonary embolism, pneumonia, asthma, rash, allergic reaction or asthma           Stable after reassessment and observation in the ED, with no findings of acute hypoxia ongoing or other unstable condition      AS OF 00:32 EST VITALS:    BP - 124/76  HR - 99  TEMP - 99.1 °F (37.3 °C) (Axillary)  O2 SATS - 90%                  DIAGNOSIS  Final diagnoses:   Pneumonitis   Adenoviral bronchopneumonia   Asthma, acute         DISPOSITION  DISCHARGE    Patient discharged in stable condition.    Reviewed implications of results, diagnosis, meds, responsibility to follow up, warning signs and symptoms of possible worsening, potential complications and reasons to return to ER.    Patient/Family voiced understanding of above instructions.    Discussed plan for discharge, as there is no emergent indication for admission.  Pt/family is agreeable and understands need for follow up and possible repeat testing.  Pt/family is aware that discharge does not mean that nothing is wrong but that it indicates no emergency is currently present that requires admission and they must continue care with follow-up as given below or with a physician of their choice.     FOLLOW-UP  Cora Hutchinson PA-C  [address]  [facility] [address] [phone]          [facility]  [address]  [address]  [phone]    Return to the ED if any new or worsening symptoms of concern.         Medication List      New Prescriptions    azithromycin 250 MG tablet  Commonly known as: Zithromax Z-Venkatesh  Take 2 tablets the first day, then 1 tablet daily for 4 days.     * predniSONE 10 MG tablet  Commonly known as: DELTASONE  Take 1 tablet by mouth Daily. 6 PO QD x 3 days, then 4 PO QD x 3 days, then 2 PO QD x 3 days 1 PO  x 3 days  Dispense #35         * This list has 1 medication(s) that are the same as other medications prescribed for you. Read the directions carefully, and ask your doctor or other care provider to review them with you.            ASK your doctor about these medications    * predniSONE 20 MG tablet  Commonly known as: DELTASONE  Take 2 tablets by mouth Daily With Breakfast for 4 days.  Ask about: Should I take this medication?         * This list has 1 medication(s) that are the same as other medications prescribed for you. Read the directions carefully, and ask your doctor or other care provider to review them with you.               Where to Get Your Medications      These medications were sent to [facility] [id number] - [place] [address] AT [facility] [address] - [phone]  - [phone] [place] [address], [facility] [address]    Phone: [phone]   · azithromycin 250 MG tablet  · predniSONE 10 MG tablet                  Trevor Ledezma MD  12/21/22 0032

## 2022-12-23 ENCOUNTER — READMISSION MANAGEMENT (OUTPATIENT)
Dept: CALL CENTER | Facility: HOSPITAL | Age: 27
End: 2022-12-23

## 2022-12-23 LAB — BACTERIA SPEC AEROBE CULT: NORMAL

## 2022-12-23 NOTE — OUTREACH NOTE
Medical Week 2 Survey    Flowsheet Row Responses   [facility] patient discharged from? [facility]   Does the patient have one of the following disease processes/diagnoses(primary or secondary)? Other   Week 2 attempt successful? Yes   Call start time 0927   Discharge diagnosis Severe persistent asthma with acute exacerbation   Call end time 0929   Medication alerts for this patient ER visit prescribed more steroid and antibiotics   Meds reviewed with patient/caregiver? Yes   Is the patient having any side effects they believe may be caused by any medication additions or changes? No   Does the patient have all medications ordered at discharge? Yes   Is the patient taking all medications as directed (includes completed medication regime)? Yes   Does the patient have a primary care provider?  Yes   Does the patient have an appointment with their PCP within 7 days of discharge? Greater than 7 days   What is preventing the patient from scheduling follow up appointments within 7 days of discharge? Earlier appointment not available   Nursing Interventions Verified appointment date/time/provider   Has the patient kept scheduled appointments due by today? N/A   Psychosocial issues? No   Comments Pt had to return to ER again recently but is improving today. She feels that her breathing is baseline today.   What is the patient's perception of their health status since discharge? Returned to baseline/stable   Is the patient/caregiver able to teach back signs and symptoms related to disease process for when to call PCP? Yes   Is the patient/caregiver able to teach back the hierarchy of who to call/visit for symptoms/problems? PCP, Specialist, Home health nurse, Urgent Care, ED, 911 Yes   Week 2 Call Completed? Yes          MERY MENDEZ - Registered Nurse

## 2022-12-25 LAB
BACTERIA SPEC AEROBE CULT: NORMAL
BACTERIA SPEC AEROBE CULT: NORMAL

## 2023-01-05 ENCOUNTER — OFFICE VISIT (OUTPATIENT)
Dept: PULMONOLOGY | Facility: CLINIC | Age: 28
End: 2023-01-05
Payer: COMMERCIAL

## 2023-01-05 VITALS
DIASTOLIC BLOOD PRESSURE: 86 MMHG | OXYGEN SATURATION: 97 % | HEIGHT: 60 IN | TEMPERATURE: 97.9 F | BODY MASS INDEX: 49.08 KG/M2 | SYSTOLIC BLOOD PRESSURE: 130 MMHG | WEIGHT: 250 LBS | HEART RATE: 93 BPM

## 2023-01-05 DIAGNOSIS — Z91.09 ENVIRONMENTAL ALLERGIES: ICD-10-CM

## 2023-01-05 DIAGNOSIS — J45.50 SEVERE PERSISTENT ASTHMA WITHOUT COMPLICATION: Primary | ICD-10-CM

## 2023-01-05 DIAGNOSIS — E66.01 MORBID OBESITY WITH BMI OF 40.0-44.9, ADULT: ICD-10-CM

## 2023-01-05 DIAGNOSIS — R29.818 SUSPECTED SLEEP APNEA: ICD-10-CM

## 2023-01-05 PROCEDURE — 99214 OFFICE O/P EST MOD 30 MIN: CPT | Performed by: NURSE PRACTITIONER

## 2023-01-05 RX ORDER — EPINEPHRINE 0.3 MG/.3ML
0.3 INJECTION SUBCUTANEOUS ONCE
Qty: 1 EACH | Refills: 0 | Status: SHIPPED | OUTPATIENT
Start: 2023-01-05 | End: 2023-01-05

## 2023-01-05 NOTE — PROGRESS NOTES
[facility] Pulmonary Follow up      Chief Complaint  Asthma and Follow-up    Subjective          Yoli Regan presents to [facility] for follow-up after recent emergency department visit on December 20 as well as admission to hospital December 11 through 14.     She was initially seen at [facility] and diagnosed with adenovirus symptoms and prednisone.  Over the next couple days she had no improvement and actually was more short of breath so she presented to the emergency department at [facility] on the 11th.  She does have a history of severe persistent asthma and was there for an acute exacerbation due to the viral illness,.  When she was seen by Dr. Nugent while in the hospital.     She was doing better and was discharged home with follow-up and a prednisone taper as well as antibiotics.  She was doing pretty well and then on the night of the 20th had any acute allergic reaction.  She thinks it was to a candle that was in her home.  She became acutely short of breath and had to call 911.  By the time the paramedics got there her lips were purple and her sats are in the 70s.  She was not kept in the ER and was given another prednisone taper.    Currently she is finished all of her prednisone and antibiotics.  She is back on her maintenance therapy of Trelegy daily and her nebs about twice a day.    She does occasionally have some shortness of breath and wheezing with activity.      Objective     Vital Signs:   /86   Pulse 93   Temp 97.9 °F (36.6 °C)   Ht 152.4 cm (60\")   Wt 113 kg (250 lb)   SpO2 97% Comment: resting, room air  BMI 48.82 kg/m²       Immunization History   Administered Date(s) Administered   • Covid-19 (Pfizer) Gray Cap 08/03/2022, 08/24/2022   • Flu Vaccine Split Quad 10/19/2016, 11/11/2020   • FluLaval/Fluzone >6mos 11/29/2018, 01/09/2020, 11/11/2020   • HPV Quadrivalent 03/24/2009, 05/29/2009   • Hep A, 2 Dose 02/24/2009   •  Hepatitis B Vaccine Adult IM 11/29/2018, 01/09/2020   • Meningococcal MCV4P (Menactra) 02/24/2009   • Pneumococcal Polysaccharide (PPSV23) 10/19/2016, 11/11/2020   • Tdap 02/24/2009   • Varicella 02/24/2009       Physical Exam  Vitals reviewed.   Constitutional:       General: She is not in acute distress.     Appearance: She is well-developed. She is obese.   HENT:      Head: Normocephalic and atraumatic.   Eyes:      Pupils: Pupils are equal, round, and reactive to light.   Cardiovascular:      Rate and Rhythm: Normal rate and regular rhythm.      Heart sounds: No murmur heard.  Pulmonary:      Effort: Pulmonary effort is normal. No respiratory distress.      Breath sounds: Normal breath sounds. No wheezing or rales.   Abdominal:      General: Bowel sounds are normal. There is no distension.      Palpations: Abdomen is soft.   Musculoskeletal:         General: Normal range of motion.      Cervical back: Normal range of motion and neck supple.   Skin:     General: Skin is warm and dry.      Findings: No erythema.   Neurological:      Mental Status: She is alert and oriented to person, place, and time.   Psychiatric:         Behavior: Behavior normal.          Result Review :       Data reviewed: Radiologic studies CT of chest and Recent hospitalization notes from December' CT 12/20/2022  FINDINGS:  There is no pathologic axillary adenopathy or other worrisome body wall  soft tissue finding in the chest. No acute findings are present in the  partially characterized upper abdomen. There is no pleural or  pericardial effusion. There is no pathologic mediastinal adenopathy.  Mildly atherosclerotic, nonaneurysmal thoracic aorta. The pulmonary  arteries are well-opacified and without evidence of focal filling defect  concerning for acute pulmonary embolus. Evaluation of the osseous  structures demonstrates no evidence of acute fracture or aggressive  osseous lesion, with multilevel thoracic spondylosis change  present.  Evaluation of the lung fields demonstrates some faint patchy bilateral  areas of groundglass opacity, concerning for pneumonia, somewhat  indeterminate for Covid related pneumonia.     IMPRESSION:  No acute pulmonary embolus.     Evaluation of the lung fields demonstrates some faint patchy bilateral  areas of groundglass opacity, concerning for pneumonia, somewhat  indeterminate for Covid related pneumonia.     This report was finalized on 12/20/2022 11:06 PM by Kip Duenas.        She had a negative COVID on December 11, but a repeat panel on her evaluation Emergency Department on the 20th showed a positive COVID PCR.             Assessment and Plan    Problem List Items Addressed This Visit        Allergies and Adverse Reactions    Environmental allergies       Endocrine and Metabolic    Morbid obesity with BMI of 40.0-44.9, adult (HCC)       Neuro    Suspected sleep apnea       Pulmonary and Pneumonias    Severe persistent asthma without complication - Primary     At this time I would like to start her on Dupixent for steroid-dependent asthma.  She has been on steroids frequently over the last several years, she did have some improvement changing to Trelegy but has continued to have exacerbations leading to hospitalization recently and some increase steroid use.  She is also having quite a bit of weight gain using the steroids.  This time she is interested in bariatric surgery but needs to be off the steroid and her asthma better controlled.    We did discuss potential side effects.  We also discussed birth control while on the biologic agent.  She does know to let us know if she does become pregnant, but at this time she has no plans of becoming pregnant.    Continue on her Trelegy daily.  Continue on her nebulizers as needed.  She does need a new nebulizer machine hers is 3 or 4 years old and not functioning properly.  She also needs new supplies for her nebulizer.  I will send that order over to  [facility] since she works nearby as well.    Follow-up in about 6 to 8 weeks after she started on the Dupixent.    She is also been ordered a home sleep study.  Unfortunate she has been unable to get that scheduled with [facility].  We will go ahead and send that through a DME such as [employer] and get her home sleep study completed.    Follow Up     No follow-ups on file.  Patient was given instructions and counseling regarding her condition or for health maintenance advice. Please see specific information pulled into the AVS if appropriate.         Moderate level of Medical Decision Making complexity based on 2 or more chronic stable illnesses and prescription drug management.    WILLIE Concepcion, ACNP  [facility]  Electronically signed

## 2023-01-06 ENCOUNTER — TELEPHONE (OUTPATIENT)
Dept: PULMONOLOGY | Facility: CLINIC | Age: 28
End: 2023-01-06
Payer: COMMERCIAL

## 2023-01-06 DIAGNOSIS — J45.50 SEVERE PERSISTENT ASTHMA WITHOUT COMPLICATION: Primary | ICD-10-CM

## 2023-01-06 NOTE — TELEPHONE ENCOUNTER
Attempted to reach, voice message lef regarding Dupient PA approval. Prescription sent to [facility]. [facility] phone number [phone] given for potential financial  assistance. Patient instructed to call office once medication has been received to schedule first injection and teaching. Patient verbalized understanding.

## 2023-01-10 RX ORDER — FLUTICASONE FUROATE, UMECLIDINIUM BROMIDE AND VILANTEROL TRIFENATATE 200; 62.5; 25 UG/1; UG/1; UG/1
POWDER RESPIRATORY (INHALATION)
Qty: 60 EACH | Refills: 3 | Status: SHIPPED | OUTPATIENT
Start: 2023-01-10

## 2023-01-27 NOTE — TELEPHONE ENCOUNTER
Spoke with patient, Dupixent medication has not been received at this time. [facility] phone given. Patient verbalized understanding.

## 2023-02-17 NOTE — TELEPHONE ENCOUNTER
Attempted to reach, voice mail left regarding  the status of medication. Office phone number given.

## 2023-03-31 ENCOUNTER — APPOINTMENT (OUTPATIENT)
Dept: MRI IMAGING | Facility: HOSPITAL | Age: 28
End: 2023-03-31
Payer: COMMERCIAL

## 2023-03-31 ENCOUNTER — HOSPITAL ENCOUNTER (EMERGENCY)
Facility: HOSPITAL | Age: 28
Discharge: HOME | End: 2023-03-31
Attending: EMERGENCY MEDICINE | Admitting: EMERGENCY MEDICINE
Payer: COMMERCIAL

## 2023-03-31 VITALS
RESPIRATION RATE: 14 BRPM | SYSTOLIC BLOOD PRESSURE: 134 MMHG | BODY MASS INDEX: 51.04 KG/M2 | HEART RATE: 82 BPM | WEIGHT: 260 LBS | DIASTOLIC BLOOD PRESSURE: 82 MMHG | OXYGEN SATURATION: 100 % | TEMPERATURE: 98.9 F | HEIGHT: 60 IN

## 2023-03-31 DIAGNOSIS — G51.0 BELL'S PALSY: Primary | ICD-10-CM

## 2023-03-31 LAB
BUN BLDA-MCNC: 10 MG/DL (ref 8–26)
CA-I BLDA-SCNC: 1.19 MMOL/L (ref 1.2–1.32)
CHLORIDE BLDA-SCNC: 104 MMOL/L (ref 98–109)
CO2 BLDA-SCNC: 25 MMOL/L (ref 24–29)
CREAT BLDA-MCNC: 0.6 MG/DL (ref 0.6–1.3)
EGFRCR SERPLBLD CKD-EPI 2021: 127.1 ML/MIN/1.73
GLUCOSE BLDC GLUCOMTR-MCNC: 124 MG/DL (ref 70–130)
HCT VFR BLDA CALC: 44 % (ref 38–51)
HGB BLDA-MCNC: 15 G/DL (ref 12–17)
POTASSIUM BLDA-SCNC: 3.6 MMOL/L (ref 3.5–4.9)
SODIUM BLD-SCNC: 140 MMOL/L (ref 138–146)

## 2023-03-31 PROCEDURE — 99282 EMERGENCY DEPT VISIT SF MDM: CPT

## 2023-03-31 PROCEDURE — A9577 INJ MULTIHANCE: HCPCS | Performed by: EMERGENCY MEDICINE

## 2023-03-31 PROCEDURE — 85014 HEMATOCRIT: CPT

## 2023-03-31 PROCEDURE — 70553 MRI BRAIN STEM W/O & W/DYE: CPT

## 2023-03-31 PROCEDURE — 80047 BASIC METABLC PNL IONIZED CA: CPT

## 2023-03-31 PROCEDURE — 0 GADOBENATE DIMEGLUMINE 529 MG/ML SOLUTION: Performed by: EMERGENCY MEDICINE

## 2023-03-31 RX ORDER — SODIUM CHLORIDE 0.9 % (FLUSH) 0.9 %
10 SYRINGE (ML) INJECTION AS NEEDED
Status: DISCONTINUED | OUTPATIENT
Start: 2023-03-31 | End: 2023-03-31 | Stop reason: HOSPADM

## 2023-03-31 RX ORDER — VALACYCLOVIR HYDROCHLORIDE 1 G/1
1000 TABLET, FILM COATED ORAL 3 TIMES DAILY
Qty: 21 TABLET | Refills: 0 | Status: SHIPPED | OUTPATIENT
Start: 2023-03-31 | End: 2023-04-07

## 2023-03-31 RX ORDER — PREDNISONE 20 MG/1
60 TABLET ORAL DAILY
Qty: 21 TABLET | Refills: 0 | Status: SHIPPED | OUTPATIENT
Start: 2023-03-31 | End: 2023-04-07

## 2023-03-31 RX ADMIN — GADOBENATE DIMEGLUMINE 20 ML: 529 INJECTION, SOLUTION INTRAVENOUS at 14:50

## 2023-03-31 NOTE — ED PROVIDER NOTES
Subjective   History of Present Illness  Pt is a 28 yo female presenting to ED with complaints of facial drooping. PMHx significant for ALMA, GERD, Asthma and Obesity. She report noticing several hours PTA difficulty closing left eye all the way and also left mouth drooping. She has noticed altered sensation and taste to her tongue the last few days. She denies headache, dizziness, confusion, neck pain, CP, SOB, cough, fever, N/V/D or abdominal pain. She reports possible weakness to left leg but denies numbness or weakness to other extremities. She denies hx of CVA or Petty Palsy. She denies tobacco, drug or ETOH use.     History provided by:  Patient, medical records and parent      Review of Systems   Constitutional: Negative for fever.   HENT: Negative for congestion, facial swelling, sore throat and trouble swallowing.    Eyes: Negative for photophobia, pain and visual disturbance.   Respiratory: Negative for cough and shortness of breath.    Cardiovascular: Negative for chest pain.   Gastrointestinal: Negative for abdominal pain.   Musculoskeletal: Negative for arthralgias and back pain.   Neurological: Positive for facial asymmetry and numbness. Negative for dizziness, speech difficulty, weakness and headaches.        Facial droop     Psychiatric/Behavioral: Negative for confusion.       Past Medical History:   Diagnosis Date   • Allergic 2000   • Asthma    • GERD (gastroesophageal reflux disease)    • Obesity 2016   • Pneumonia 2017   • Sleep apnea, obstructive     not diagnosed but drs have noticed during hospital stays       No Known Allergies    Past Surgical History:   Procedure Laterality Date   • CHOLECYSTECTOMY         Family History   Problem Relation Age of Onset   • Asthma Mother    • Sleep apnea Mother    • Diabetes type II Father    • Coronary artery disease Maternal Grandmother    • Heart attack Maternal Grandmother    • Asthma Paternal Grandmother    • Hypertension Paternal Grandmother         Social History     Socioeconomic History   • Marital status: Single   Tobacco Use   • Smoking status: Never   • Smokeless tobacco: Never   Substance and Sexual Activity   • Alcohol use: No   • Drug use: No   • Sexual activity: Yes     Partners: Male     Birth control/protection: None           Objective   Physical Exam  Vitals and nursing note reviewed.   Constitutional:       General: She is not in acute distress.     Appearance: She is obese.   HENT:      Head: Atraumatic.   Eyes:      Extraocular Movements: Extraocular movements intact.      Conjunctiva/sclera: Conjunctivae normal.      Pupils: Pupils are equal, round, and reactive to light.   Cardiovascular:      Rate and Rhythm: Normal rate and regular rhythm.   Pulmonary:      Effort: Pulmonary effort is normal. No respiratory distress.   Musculoskeletal:         General: Normal range of motion.      Cervical back: Normal range of motion and neck supple.   Skin:     General: Skin is warm.   Neurological:      Mental Status: She is alert and oriented to person, place, and time.      Cranial Nerves: Facial asymmetry (left eyelid difficulty closing completely and left mouth droop) present.      Sensory: Sensory deficit (altered left face) present.      Motor: No weakness.      Coordination: Coordination is intact. Finger-Nose-Finger Test and Heel to Shin Test normal.      Gait: Gait is intact.   Psychiatric:         Mood and Affect: Mood normal.         Procedures           ED Course            Recent Results (from the past 24 hour(s))   POC CHEM 8    Collection Time: 03/31/23  1:39 PM    Specimen: Blood   Result Value Ref Range    Glucose 124 70 - 130 mg/dL    BUN 10 8 - 26 mg/dL    Creatinine 0.60 0.60 - 1.30 mg/dL    Sodium 140 138 - 146 mmol/L    POC Potassium 3.6 3.5 - 4.9 mmol/L    Chloride 104 98 - 109 mmol/L    Total CO2 25 24 - 29 mmol/L    Hemoglobin 15.0 12.0 - 17.0 g/dL    Hematocrit 44 38 - 51 %    Ionized Calcium 1.19 (L) 1.20 - 1.32 mmol/L     "eGFR 127.1 >60.0 mL/min/1.73     Note: In addition to lab results from this visit, the labs listed above may include labs taken at another facility or during a different encounter within the last 24 hours. Please correlate lab times with ED admission and discharge times for further clarification of the services performed during this visit.    MRI Brain With & Without Contrast   Final Result   Impression:       1. No acute findings in the brain.   2. Mild enhancement within the lateral aspect of the left internal auditory canal, which would relate to Bell's palsy.      Electronically Signed: Noel Cameron     3/31/2023 3:12 PM EDT     Workstation ID: [id number]        Vitals:    03/31/23 1253   BP: 134/82   BP Location: Left arm   Patient Position: Sitting   Pulse: 82   Resp: 14   Temp: 98.9 °F (37.2 °C)   TempSrc: Oral   SpO2: 100%   Weight: 118 kg (260 lb)   Height: 152.4 cm (60\")     Medications   gadobenate dimeglumine (MULTIHANCE) injection 20 mL (20 mL Intravenous Given 3/31/23 1450)     ECG/EMG Results (last 24 hours)     ** No results found for the last 24 hours. **        No orders to display       DISCHARGE    Patient discharged in stable condition.    Reviewed implications of results, diagnosis, meds, responsibility to follow up, warning signs and symptoms of possible worsening, potential complications and reasons to return to ER.    Patient/Family voiced understanding of above instructions.    Discussed plan for discharge, as there is no emergent indication for admission.  Pt/family is agreeable and understands need for follow up and possible repeat testing.  Pt/family is aware that discharge does not mean that nothing is wrong but that it indicates no emergency is currently present that requires admission and they must continue care with follow-up as given below or with a physician of their choice.     FOLLOW-UP  Sandra Guerra APRN  [address]  [address]  [facility] " [id number]  [phone]    Schedule an appointment as soon as possible for a visit         [facility]  [phone]             Medication List      New Prescriptions    valACYclovir 1000 MG tablet  Commonly known as: VALTREX  Take 1 tablet by mouth 3 (Three) Times a Day for 7 days.        Changed    * predniSONE 10 MG tablet  Commonly known as: DELTASONE  Take 1 tablet by mouth Daily. 6 PO QD x 3 days, then 4 PO QD x 3 days, then 2 PO QD x 3 days 1 PO  x 3 days  Dispense #35  What changed: Another medication with the same name was added. Make sure you understand how and when to take each.     * predniSONE 20 MG tablet  Commonly known as: DELTASONE  Take 3 tablets by mouth Daily for 7 days.  What changed: You were already taking a medication with the same name, and this prescription was added. Make sure you understand how and when to take each.         * This list has 2 medication(s) that are the same as other medications prescribed for you. Read the directions carefully, and ask your doctor or other care provider to review them with you.               Where to Get Your Medications      These medications were sent to [facility] [id number] - [place], [place] - [facility] [address] AT [facility] [address] - [phone]  - [phone] [address] [facility], located within [facility] [address]    Phone: [phone]   · predniSONE 20 MG tablet  · valACYclovir 1000 MG tablet                                         Medical Decision Making  PT is a 28 yo female presenting to ED with complaints of left facial droop to mouth and difficulty closing left eyelid. She also has altered taste / sensation to tongue. Obtained MRI without emergent findings. Discussed results and tx plan. Will dc home on Prednisone and Valtrex. She will fu with PCP and Neurology.     Discussed patient with Dr. Mohr who is agreeable with ED course and tx plan.     DDx  CVA, Brain Mass, [place] Palsy, MS    Bell's palsy: acute illness or  injury  Amount and/or Complexity of Data Reviewed  Independent Historian: parent  Labs: ordered. Decision-making details documented in ED Course.  Radiology: ordered. Decision-making details documented in ED Course.      Risk  Prescription drug management.          Final diagnoses:   Bell's palsy       ED Disposition  ED Disposition     ED Disposition   Discharge    Condition   Stable    Comment   --             Sandra Guerra, APRN  [address]  [address]  [place] [id number]  [phone]    Schedule an appointment as soon as possible for a visit         [facility]  [phone]             Medication List      New Prescriptions    valACYclovir 1000 MG tablet  Commonly known as: VALTREX  Take 1 tablet by mouth 3 (Three) Times a Day for 7 days.        Changed    * predniSONE 10 MG tablet  Commonly known as: DELTASONE  Take 1 tablet by mouth Daily. 6 PO QD x 3 days, then 4 PO QD x 3 days, then 2 PO QD x 3 days 1 PO  x 3 days  Dispense #35  What changed: Another medication with the same name was added. Make sure you understand how and when to take each.     * predniSONE 20 MG tablet  Commonly known as: DELTASONE  Take 3 tablets by mouth Daily for 7 days.  What changed: You were already taking a medication with the same name, and this prescription was added. Make sure you understand how and when to take each.         * This list has 2 medication(s) that are the same as other medications prescribed for you. Read the directions carefully, and ask your doctor or other care provider to review them with you.               Where to Get Your Medications      These medications were sent to [facility] [id number] - [place], [place] - [facility] [address] AT [facility] [address] - [phone]  - [phone] [place] [address], [facility] [address]    Phone: [phone]   · predniSONE 20 MG tablet  · valACYclovir 1000 MG tablet          Mary Kate Escalona PA  03/31/23 8196

## 2023-05-29 ENCOUNTER — TELEMEDICINE (OUTPATIENT)
Dept: FAMILY MEDICINE CLINIC | Facility: TELEHEALTH | Age: 28
End: 2023-05-29

## 2023-05-29 VITALS — HEIGHT: 60 IN | WEIGHT: 260 LBS | TEMPERATURE: 99.7 F | BODY MASS INDEX: 51.04 KG/M2

## 2023-05-29 DIAGNOSIS — J03.90 TONSILLITIS: Primary | ICD-10-CM

## 2023-05-29 RX ORDER — AMOXICILLIN 500 MG/1
500 CAPSULE ORAL 2 TIMES DAILY
Qty: 20 CAPSULE | Refills: 0 | Status: SHIPPED | OUTPATIENT
Start: 2023-05-29 | End: 2023-06-08

## 2023-05-29 NOTE — PROGRESS NOTES
You have chosen to receive care through a telehealth visit.  Do you consent to use a video/audio connection for your medical care today? Yes     CHIEF COMPLAINT  Cc: sore throat    HPI  Yoli Regan is a 28 y.o. female  presents with complaint of sore throat. She reports that she thinks that she has strep throat and would like some antibiotics. Her symptoms started yesterday and include sore throat, fatigue, fever, body aches and headache. She also reports that her tonsils and glands are swollen. Her temperature has been as high as 101.3 but is currently 99.2 post tylenol which has been taking for her symptoms. She thinks she got this from her niece who slept with her and is also sick.       Review of Systems   Constitutional:  Positive for fatigue and fever.   HENT:  Positive for sore throat. Negative for congestion, postnasal drip, rhinorrhea, sinus pressure, sinus pain and sneezing.    Respiratory:  Negative for cough and shortness of breath.    Gastrointestinal:  Negative for diarrhea and nausea.   Musculoskeletal:  Positive for myalgias.   Neurological:  Positive for headaches.     Past Medical History:   Diagnosis Date    Allergic 2000    Asthma     GERD (gastroesophageal reflux disease)     Obesity 2016    Pneumonia 2017    Sleep apnea, obstructive     not diagnosed but drs have noticed during hospital stays       Family History   Problem Relation Age of Onset    Asthma Mother     Sleep apnea Mother     Diabetes type II Father     Coronary artery disease Maternal Grandmother     Heart attack Maternal Grandmother     Asthma Paternal Grandmother     Hypertension Paternal Grandmother        Social History     Socioeconomic History    Marital status: Single   Tobacco Use    Smoking status: Never    Smokeless tobacco: Never   Substance and Sexual Activity    Alcohol use: No    Drug use: No    Sexual activity: Yes     Partners: Male     Birth control/protection: None       Yoli Regan  reports that  "she has never smoked. She has never used smokeless tobacco..       Temp 99.7 °F (37.6 °C) Comment: post tylenol  Ht 152.4 cm (60\")   Wt 118 kg (260 lb)   Breastfeeding No   BMI 50.78 kg/m²     PHYSICAL EXAM  Physical Exam   Constitutional: She is oriented to person, place, and time. She appears well-developed and well-nourished.   HENT:   Head: Normocephalic and atraumatic.   Right Ear: External ear normal.   Left Ear: External ear normal.   Nose: Nose normal.   Mouth/Throat: Mucous membranes are erythematous. white patches.  Tonsils edematous   Eyes: Lids are normal. Right eye exhibits no discharge and no exudate. Left eye exhibits no discharge and no exudate. Right conjunctiva is not injected. Left conjunctiva is not injected.   Pulmonary/Chest: No accessory muscle usage. No tachypnea and no bradypnea.  No respiratory distress.No use of oxygen by nasal cannulaNo use of oxygen by mask noted.  Lymphadenopathy:        Right cervical: Anterior cervical (patient directed exam) adenopathy present.        Left cervical: Anterior cervical (patient directed exam) adenopathy present.   Neurological: She is alert and oriented to person, place, and time. No cranial nerve deficit.   Skin: Her skin appears normal.  Psychiatric: She has a normal mood and affect. Her speech is normal and behavior is normal. Judgment and thought content normal.     Results for orders placed or performed during the hospital encounter of 03/31/23   POC CHEM 8    Specimen: Blood   Result Value Ref Range    Glucose 124 70 - 130 mg/dL    BUN 10 8 - 26 mg/dL    Creatinine 0.60 0.60 - 1.30 mg/dL    Sodium 140 138 - 146 mmol/L    POC Potassium 3.6 3.5 - 4.9 mmol/L    Chloride 104 98 - 109 mmol/L    Total CO2 25 24 - 29 mmol/L    Hemoglobin 15.0 12.0 - 17.0 g/dL    Hematocrit 44 38 - 51 %    Ionized Calcium 1.19 (L) 1.20 - 1.32 mmol/L    eGFR 127.1 >60.0 mL/min/1.73       Diagnoses and all orders for this visit:    1. Tonsillitis (Primary)    Other " orders  -     amoxicillin (AMOXIL) 500 MG capsule; Take 1 capsule by mouth 2 (Two) Times a Day for 10 days.  Dispense: 20 capsule; Refill: 0    Amoxicillin as directed  Probiotics for two weeks related to taking antibiotics. The pharmacist can help you with this if needed. Do not take within two hours of antibiotic.Alternate tylenol and ibuprofen for pain or fever  Hydrate well  May gargle with warm salt water  May try hot tea with lemon and honey    FOLLOW-UP  If symptoms worsen or persist follow up with PCP.Virtual Care or Urgent Care    Patient verbalizes understanding of medication dosage, comfort measures, instructions for treatment and follow-up.    Jaquelin Boateng, APRN  05/29/2023  10:56 EDT    The use of a video visit has been reviewed with the patient and verbal informed consent has been obtained. Myself and Yoli Regan participated in this visit. The patient is located in [address] [place] [address].    I am located in [place], [place]. [employer] and [email or web address] were utilized. I spent 25 minutes in the patient's chart for this visit.

## 2023-05-29 NOTE — PATIENT INSTRUCTIONS
Tonsillitis  Tonsillitis is an infection of the throat that causes the tonsils to become red, tender, and swollen. Tonsils are tissues in the back of your throat. Each tonsil has crevices (crypts). Tonsils normally work to protect the body from infection.  What are the causes?  Sudden (acute) tonsillitis may be caused by a virus or bacteria, including streptococcal bacteria. Long-lasting (chronic) tonsillitis occurs when the crypts of the tonsils become filled with pieces of food and bacteria, which makes it easy for the tonsils to become repeatedly infected.  Tonsillitis can be spread from person to person when it is caused by a virus or bacteria. It may be spread by inhaling droplets that are released with coughing or sneezing. You may also come into contact with viruses or bacteria on surfaces, such as cups or utensils.  What are the signs or symptoms?  Symptoms of this condition include:  A sore throat. This may include trouble swallowing.  White patches on the tonsils.  Swollen tonsils.  Fever.  Headache.  Tiredness.  Loss of appetite.  Snoring during sleep when you did not snore before.  Small, foul-smelling, yellowish-white pieces of material (tonsilloliths) that you occasionally cough up or spit out. These can cause you to have bad breath.  How is this diagnosed?  This condition is diagnosed with a physical exam. Diagnosis can be confirmed with the results of lab tests, including a throat culture.  How is this treated?  Treatment for this condition depends on the cause, but usually focuses on treating the symptoms associated with it. Treatment may include:  Medicines to relieve pain and manage fever.  Steroid medicines to reduce swelling.  Antibiotic medicines if the condition is caused by bacteria.  If episodes of tonsillitis are severe and frequent, your health care provider may recommend surgery to remove the tonsils (tonsillectomy).  Follow these instructions at home:  Medicines  Take over-the-counter  and prescription medicines only as told by your health care provider.  If you were prescribed an antibiotic medicine, take it as told by your health care provider. Do not stop taking the antibiotic even if you start to feel better.  Eating and drinking  Drink enough fluid to keep your urine pale yellow.  While your throat is sore, eat soft or liquid foods, such as sherbet, soups, or soft, warm cereals, such as oatmeal or hot wheat cereal.  Drink warm liquids.  Eat frozen ice pops.  General instructions  Rest as much as possible and get plenty of sleep.  Gargle with a mixture of salt and water 3-4 times a day or as needed.  Do not swallow the mixture of salt and water.  Wash your hands regularly with soap and water for at least 20 seconds. If soap and water are not available, use hand .  Do not share cups, bottles, or other utensils until your symptoms have gone away.  Do not use any products that contain nicotine or tobacco. These products include cigarettes, chewing tobacco, and vaping devices, such as e-cigarettes. If you need help quitting, ask your health care provider.  Keep all follow-up visits. This is important.  Contact a health care provider if:  You notice large, tender lumps in your neck that were not there before.  You have a fever that does not go away after 2-3 days.  You develop a rash.  You cough up a green, yellow-brown, or bloody substance.  You cannot swallow liquids or food for 24 hours.  Only one of your tonsils is swollen.  Get help right away if:  You develop any new symptoms, such as vomiting, severe headache, stiff neck, chest pain, trouble breathing, or trouble swallowing.  You have severe throat pain along with drooling or voice changes.  You have severe pain that is not controlled with medicines.  You cannot fully open your mouth.  You develop redness, swelling, or severe pain anywhere in your neck.  Summary  Tonsillitis is an infection of the throat that causes the tonsils to  become red, tender, and swollen. The most common symptom is pain in the throat.  Tonsillitis is most often caused by a virus or bacteria.  Get help right away if you develop any new symptoms, such as vomiting, severe headache, stiff neck, chest pain, or trouble breathing.  This information is not intended to replace advice given to you by your health care provider. Make sure you discuss any questions you have with your health care provider.  Document Revised: 05/12/2022 Document Reviewed: 05/12/2022  [employer] Patient Education © 2022 [employer]

## 2023-09-14 ENCOUNTER — HOSPITAL ENCOUNTER (EMERGENCY)
Facility: HOSPITAL | Age: 28
Discharge: HOME | End: 2023-09-15
Attending: EMERGENCY MEDICINE
Payer: COMMERCIAL

## 2023-09-14 ENCOUNTER — APPOINTMENT (OUTPATIENT)
Dept: GENERAL RADIOLOGY | Facility: HOSPITAL | Age: 28
End: 2023-09-14
Payer: COMMERCIAL

## 2023-09-14 DIAGNOSIS — J45.901 MILD ASTHMA WITH ACUTE EXACERBATION, UNSPECIFIED WHETHER PERSISTENT: Primary | ICD-10-CM

## 2023-09-14 PROCEDURE — 99283 EMERGENCY DEPT VISIT LOW MDM: CPT

## 2023-09-14 PROCEDURE — 71045 X-RAY EXAM CHEST 1 VIEW: CPT

## 2023-09-14 PROCEDURE — 93005 ELECTROCARDIOGRAM TRACING: CPT | Performed by: EMERGENCY MEDICINE

## 2023-09-14 RX ORDER — IPRATROPIUM BROMIDE AND ALBUTEROL SULFATE 2.5; .5 MG/3ML; MG/3ML
3 SOLUTION RESPIRATORY (INHALATION) ONCE
Status: COMPLETED | OUTPATIENT
Start: 2023-09-15 | End: 2023-09-15

## 2023-09-14 RX ORDER — SODIUM CHLORIDE 0.9 % (FLUSH) 0.9 %
10 SYRINGE (ML) INJECTION AS NEEDED
Status: DISCONTINUED | OUTPATIENT
Start: 2023-09-14 | End: 2023-09-15 | Stop reason: HOSPADM

## 2023-09-14 RX ORDER — METHYLPREDNISOLONE SODIUM SUCCINATE 125 MG/2ML
125 INJECTION, POWDER, LYOPHILIZED, FOR SOLUTION INTRAMUSCULAR; INTRAVENOUS ONCE
Status: COMPLETED | OUTPATIENT
Start: 2023-09-15 | End: 2023-09-15

## 2023-09-15 VITALS
DIASTOLIC BLOOD PRESSURE: 72 MMHG | SYSTOLIC BLOOD PRESSURE: 122 MMHG | TEMPERATURE: 98.4 F | OXYGEN SATURATION: 98 % | RESPIRATION RATE: 20 BRPM | HEART RATE: 101 BPM | BODY MASS INDEX: 49.08 KG/M2 | WEIGHT: 250 LBS | HEIGHT: 60 IN

## 2023-09-15 LAB
QT INTERVAL: 344 MS
QTC INTERVAL: 450 MS

## 2023-09-15 PROCEDURE — 25010000002 METHYLPREDNISOLONE PER 125 MG: Performed by: EMERGENCY MEDICINE

## 2023-09-15 PROCEDURE — 94799 UNLISTED PULMONARY SVC/PX: CPT

## 2023-09-15 PROCEDURE — 94640 AIRWAY INHALATION TREATMENT: CPT

## 2023-09-15 PROCEDURE — 96374 THER/PROPH/DIAG INJ IV PUSH: CPT

## 2023-09-15 RX ORDER — METHYLPREDNISOLONE 4 MG/1
TABLET ORAL
Qty: 21 TABLET | Refills: 0 | Status: SHIPPED | OUTPATIENT
Start: 2023-09-15 | End: 2023-09-22

## 2023-09-15 RX ORDER — ALBUTEROL SULFATE 90 UG/1
2 AEROSOL, METERED RESPIRATORY (INHALATION) EVERY 4 HOURS PRN
Qty: 8 G | Refills: 0 | Status: SHIPPED | OUTPATIENT
Start: 2023-09-15 | End: 2023-10-15

## 2023-09-15 RX ADMIN — METHYLPREDNISOLONE SODIUM SUCCINATE 125 MG: 125 INJECTION INTRAMUSCULAR; INTRAVENOUS at 00:06

## 2023-09-15 RX ADMIN — SODIUM CHLORIDE 1000 ML: 9 INJECTION, SOLUTION INTRAVENOUS at 00:07

## 2023-09-15 RX ADMIN — IPRATROPIUM BROMIDE AND ALBUTEROL SULFATE 3 ML: 2.5; .5 SOLUTION RESPIRATORY (INHALATION) at 00:27

## 2023-09-15 NOTE — ED PROVIDER NOTES
Subjective   History of Present Illness  Is a 28-year-old female with past medical history of asthma presented to the emergency department with some shortness of breath.  Patient states that she has been short of breath for the last 2 days.  States that intensified this evening.  She reports all day and noticed some wheezing.  She been using inhaler, however it is not helping.  She had some mild cough.  Nonproductive in nature.  She endorses some wheezing.  She is not having any fevers or chills.  No headache or change in vision.  No focal weakness.  No chest pain.  No abdominal pain or vomiting.    History provided by:  Patient   used: No      Review of Systems   Constitutional:  Negative for chills and fever.   HENT:  Negative for congestion, ear pain and sore throat.    Eyes:  Negative for visual disturbance.   Respiratory:  Positive for cough, shortness of breath and wheezing.    Cardiovascular:  Negative for chest pain.   Gastrointestinal:  Negative for abdominal pain.   Genitourinary:  Negative for difficulty urinating.   Musculoskeletal:  Negative for arthralgias.   Skin:  Negative for rash.   Neurological:  Negative for dizziness, weakness and numbness.   Psychiatric/Behavioral:  Negative for agitation.      Past Medical History:   Diagnosis Date    Allergic 2000    Asthma     GERD (gastroesophageal reflux disease)     Obesity 2016    Pneumonia 2017    Sleep apnea, obstructive     not diagnosed but drs have noticed during hospital stays       No Known Allergies    Past Surgical History:   Procedure Laterality Date    CHOLECYSTECTOMY         Family History   Problem Relation Age of Onset    Asthma Mother     Sleep apnea Mother     Diabetes type II Father     Coronary artery disease Maternal Grandmother     Heart attack Maternal Grandmother     Asthma Paternal Grandmother     Hypertension Paternal Grandmother        Social History     Socioeconomic History    Marital status: Single    Tobacco Use    Smoking status: Never    Smokeless tobacco: Never   Substance and Sexual Activity    Alcohol use: No    Drug use: No    Sexual activity: Yes     Partners: Male     Birth control/protection: None           Objective   Physical Exam  Vitals and nursing note reviewed.   Constitutional:       General: She is not in acute distress.     Appearance: She is not ill-appearing or toxic-appearing.   HENT:      Mouth/Throat:      Pharynx: No posterior oropharyngeal erythema.   Eyes:      Conjunctiva/sclera: Conjunctivae normal.      Pupils: Pupils are equal, round, and reactive to light.   Cardiovascular:      Rate and Rhythm: Normal rate and regular rhythm.   Pulmonary:      Effort: Pulmonary effort is normal. No respiratory distress.      Breath sounds: Wheezing present.   Abdominal:      General: Abdomen is flat. There is no distension.      Palpations: There is no mass.      Tenderness: There is no abdominal tenderness. There is no guarding or rebound.   Musculoskeletal:         General: No deformity. Normal range of motion.   Skin:     General: Skin is warm.      Findings: No rash.   Neurological:      General: No focal deficit present.      Mental Status: She is alert and oriented to person, place, and time.      Motor: No weakness.       ECG 12 Lead      Date/Time: 9/15/2023 1:01 AM  Performed by: Estevan Chou MD  Authorized by: Estevan Chou MD   Interpreted by physician  Comparison: compared with previous ECG   Similar to previous ECG  Rhythm: sinus tachycardia  Rate: tachycardic  BPM: 103  QRS axis: normal  Conduction: conduction normal  ST Segments: ST segments normal  T Waves: T waves normal  Other: no other findings  Clinical impression: normal ECG  Comments: EKG was directly visualized by myself, interpretations as documented in hospital course.             ED Course  ED Course as of 09/15/23 0106   Fri Sep 15, 2023   0104 BP: 139/79 [JK]   0104 Temp: 98.4 °F (36.9 °C) [JK]   0104  Temp src: Oral [JK]   0104 Heart Rate: 102 [JK]   0104 Resp: 18 [JK]   0104 SpO2: 92 %  Patient's repeat vitals, telemetry tracing, and pulse oximetry tracing were directly viewed and interpreted by myself.  Patient is slightly tachycardic with a heart rate of 102 bpm [JK]   0104 XR Chest 1 View  Imaging was directly visualized by myself, per my interpretations, chest x-ray was normal. [JK]   0104 On reevaluation, the patient is feeling much better.  Her wheezing is improved.  She is not having any respiratory distress or significant desaturations.  Patient is requesting discharge for outpatient treatment.  I did instruct her to  return to the emergency department for any change in symptoms.  Given strict return precautions.  Verbalized understanding. [JK]   0104 I had a discussion with the patient/family regarding diagnosis, diagnostic results, treatment plan, and medications. The patient/family indicated understanding of these instructions. I spent adequate time at the bedside prior to discharge necessary to discuss the aftercare instructions, giving patient education, providing explanations of the results of our evaluations/findings, and my decision making to assure that the patient/family understand the plan of care. Time was allotted to answer questions at that time and throughout the ED course. Patient is required to maintain timely follow up, as discussed. I also discussed the potential for the development of an acute emergent condition requiring further evaluation, return to the ER, admission, or even surgical intervention.  I encouraged the patient to return to the emergency department immediately for any concerns, worsening symptoms, new complaints, or if symptoms persist and they are unable to seek follow-up in a timely fashion. The patient/family expressed understanding and agreement with this plan    Shared decision making:   After full review of the patient's clinical presentation, review of any  work-up including but not limited to laboratory studies and radiology obtained, I had a discussion with the patient.  Treatment options were discussed as well as the risks, benefits and consequences.  I discussed all findings with the patient and family members if available.  During the discussion, treatment goals were understood by all as well as any misconceptions which were addressed with the patient.  Ample time was given for any questions they may have had.  They are in agreement with the treatment plan as well as final disposition. [JK]      ED Course User Index  [JK] Estevan Chou MD                                           Medical Decision Making  This is a 28-year-old female presented to the emergency department with some cough shortness of breath and wheezing.  Patient is longstanding history of asthma.  Symptoms seem consistent with asthma exacerbation.  Patient does not appear to be in any acute respiratory distress at this time.  There is no hypoxia.  She does have some wheezing on examination.  Patient provided DuoNeb therapy.  IV access was established.  She is placed on continuous telemetry.  Work-up initiated.      Differential diagnosis: Asthma exacerbation, pneumonia, bronchitis, pneumothorax      Amount and/or Complexity of Data Reviewed  External Data Reviewed: labs, radiology, ECG and notes.     Details: External laboratories, imaging as well as notes were reviewed personally by myself.  All relevant studies were used to guide decision making.     Date of previous record: 12/11/2022    Source of note: Admission record    Summary: Patient was seen and evaluated for mild persistent asthma.  Patient had some hypoxia and was admitted for respiratory failure second to that at the time.  I did review the patient's laboratory studies including basic labs and ABG.  Also remove multiple imaging studies of chest including chest x-ray and CT angiogram.  Patient had multiple EKGs on file which were  reviewed.  Records reviewed.  Radiology: ordered and independent interpretation performed. Decision-making details documented in ED Course.  ECG/medicine tests: ordered and independent interpretation performed. Decision-making details documented in ED Course.    Risk  Prescription drug management.        Final diagnoses:   Mild asthma with acute exacerbation, unspecified whether persistent       ED Disposition  ED Disposition       ED Disposition   Discharge    Condition   Stable    Comment   --               Sandra Guerra, APRN  [address]  [address]  [place] [id number]  [phone]    Call in 1 day           Medication List        New Prescriptions      methylPREDNISolone 4 MG dose pack  Commonly known as: MEDROL  Take as directed on package instructions.            Changed      * albuterol sulfate  (90 Base) MCG/ACT inhaler  Commonly known as: PROVENTIL HFA;VENTOLIN HFA;PROAIR HFA  Inhale 2 puffs Every 4 (Four) Hours As Needed for Wheezing.  What changed: Another medication with the same name was added. Make sure you understand how and when to take each.     * albuterol (2.5 MG/3ML) 0.083% nebulizer solution  Commonly known as: PROVENTIL  Inhale 1 vial VIA nebulizer every 4 hours as needed for wheezing  What changed: Another medication with the same name was added. Make sure you understand how and when to take each.     * albuterol sulfate  (90 Base) MCG/ACT inhaler  Commonly known as: PROVENTIL HFA;VENTOLIN HFA;PROAIR HFA  Inhale 2 puffs Every 4 (Four) Hours As Needed for Wheezing for up to 30 days.  What changed: You were already taking a medication with the same name, and this prescription was added. Make sure you understand how and when to take each.           * This list has 3 medication(s) that are the same as other medications prescribed for you. Read the directions carefully, and ask your doctor or other care provider to review them with you.                   Where to Get  Your Medications        These medications were sent to [facility] [id number] - [place], [place] - [address] AT [facility] [address] - [phone] PH - [phone] FX  [address] [place] [address] [place] , [facility] [address]      Phone: [phone]   albuterol sulfate  (90 Base) MCG/ACT inhaler  methylPREDNISolone 4 MG dose pack            Estevan Chou MD  09/15/23 0106

## 2023-09-19 ENCOUNTER — TELEPHONE (OUTPATIENT)
Dept: PULMONOLOGY | Facility: CLINIC | Age: 28
End: 2023-09-19
Payer: COMMERCIAL

## 2023-10-18 RX ORDER — FLUTICASONE FUROATE, UMECLIDINIUM BROMIDE AND VILANTEROL TRIFENATATE 200; 62.5; 25 UG/1; UG/1; UG/1
POWDER RESPIRATORY (INHALATION)
Qty: 60 EACH | Refills: 0 | Status: SHIPPED | OUTPATIENT
Start: 2023-10-18

## 2023-11-13 RX ORDER — FLUTICASONE FUROATE, UMECLIDINIUM BROMIDE AND VILANTEROL TRIFENATATE 200; 62.5; 25 UG/1; UG/1; UG/1
1 POWDER RESPIRATORY (INHALATION) DAILY
Qty: 60 EACH | Refills: 4 | Status: SHIPPED | OUTPATIENT
Start: 2023-11-13

## 2024-01-08 ENCOUNTER — TELEMEDICINE (OUTPATIENT)
Dept: FAMILY MEDICINE CLINIC | Facility: TELEHEALTH | Age: 29
End: 2024-01-08

## 2024-01-08 DIAGNOSIS — Z20.822 SUSPECTED COVID-19 VIRUS INFECTION: Primary | ICD-10-CM

## 2024-01-08 DIAGNOSIS — J45.901 EXACERBATION OF ASTHMA, UNSPECIFIED ASTHMA SEVERITY, UNSPECIFIED WHETHER PERSISTENT: ICD-10-CM

## 2024-01-08 RX ORDER — ALBUTEROL SULFATE 2.5 MG/3ML
2.5 SOLUTION RESPIRATORY (INHALATION) EVERY 4 HOURS PRN
Qty: 24 EACH | Refills: 0 | Status: SHIPPED | OUTPATIENT
Start: 2024-01-08

## 2024-01-08 RX ORDER — COVID-19 ANTIGEN TEST
1 KIT MISCELLANEOUS ONCE
Qty: 1 KIT | Refills: 0 | Status: SHIPPED | OUTPATIENT
Start: 2024-01-08 | End: 2024-01-08

## 2024-01-08 RX ORDER — PREDNISONE 20 MG/1
TABLET ORAL
Qty: 24 TABLET | Refills: 0 | Status: SHIPPED | OUTPATIENT
Start: 2024-01-08

## 2024-01-08 NOTE — PROGRESS NOTES
You have chosen to receive care through a telehealth visit.  Do you consent to use a video/audio connection for your medical care today? Yes     HPI  Yoli Regan is a 28 y.o. female  presents with complaint of 2 days of shortness of breath and wheezing with nasal congestion and runny nose. She reports no fever. She is using her albuterol inhaler every 2 hours for shortness of breath with any exertion. She reports no fever, sore throat, headache or body aches. Oxygen saturation is stable at 96%.     Review of Systems   Constitutional: Negative.    HENT:  Positive for postnasal drip and rhinorrhea.    Respiratory:  Positive for cough, shortness of breath and wheezing.    Cardiovascular: Negative.    Gastrointestinal: Negative.    Musculoskeletal: Negative.    Skin: Negative.    Neurological: Negative.    Hematological: Negative.    Psychiatric/Behavioral: Negative.         Past Medical History:   Diagnosis Date    Allergic 2000    Asthma     GERD (gastroesophageal reflux disease)     Obesity 2016    Pneumonia 2017    Sleep apnea, obstructive     not diagnosed but drs have noticed during hospital stays       Family History   Problem Relation Age of Onset    Asthma Mother     Sleep apnea Mother     Diabetes type II Father     Coronary artery disease Maternal Grandmother     Heart attack Maternal Grandmother     Asthma Paternal Grandmother     Hypertension Paternal Grandmother        Social History     Socioeconomic History    Marital status: Single   Tobacco Use    Smoking status: Never    Smokeless tobacco: Never   Substance and Sexual Activity    Alcohol use: No    Drug use: No    Sexual activity: Yes     Partners: Male     Birth control/protection: None         There were no vitals taken for this visit.    PHYSICAL EXAM  Physical Exam   Constitutional: She is oriented to person, place, and time. She appears well-developed and well-nourished. She does not have a sickly appearance. She does not appear ill. No  distress.   HENT:   Head: Normocephalic and atraumatic.   Nose: Rhinorrhea present.   Mouth/Throat: Mouth/Lips are normal.  Ear feel full bilaterally    Pulmonary/Chest: Effort normal. She is in respiratory distress. She Audible wheeze noted...  Neurological: She is alert and oriented to person, place, and time.   Psychiatric: She has a normal mood and affect.   Vitals reviewed.      Diagnoses and all orders for this visit:    1. Suspected COVID-19 virus infection (Primary)  -     COVID-19 At Home Antigen Test (OxxyaxNOW COVID-19 Ag Home Test) kit; 1 kit by In Vitro route 1 (One) Time for 1 dose.  Dispense: 1 kit; Refill: 0    2. Exacerbation of asthma, unspecified asthma severity, unspecified whether persistent  -     albuterol (PROVENTIL) (2.5 MG/3ML) 0.083% nebulizer solution; Take 2.5 mg by nebulization Every 4 (Four) Hours As Needed for Wheezing.  Dispense: 24 each; Refill: 0  -     predniSONE (DELTASONE) 20 MG tablet; Days 1 through 4: Take 1 tablet 3 times per day.  Days 5 through 8: Take 1 tablet twice per day.  Days 9 through 12: Take 1 tablet daily  Dispense: 24 tablet; Refill: 0    Advised patient to check for Covid 19 today.   Notify [facility] care if positive.   Follow up in [facility] ED for worsening s/s.       FOLLOW-UP  As discussed during visit with [facility] Care, if symptoms worsen or fail to improve, follow-up with PCP/Urgent Care/Emergency Department.    Patient verbalizes understanding of medications, instructions for treatment and follow-up.    Annika Eckert, APRN  01/08/2024  11:29 EST    The use of a video visit has been reviewed with the patient and verbal informed consent has been obtained. Myself and Yoli Regan participated in this visit. The patient is located in [facility], and I am located in [place], [place]. [employer] and [employer]  were utilized.

## 2024-01-08 NOTE — LETTER
January 8, 2024     Patient: Yoli Regan   YOB: 1995   Date of Visit: 1/8/2024       To Whom it May Concern:    Yoli Regan was seen in my clinic on 1/8/2024. She  may return to work on Wednesday, 1/10/2024 if symptoms allow.             Sincerely,          WILLIE Bear        CC: No Recipients

## 2024-01-20 ENCOUNTER — TELEMEDICINE (OUTPATIENT)
Dept: FAMILY MEDICINE CLINIC | Facility: TELEHEALTH | Age: 29
End: 2024-01-20

## 2024-01-20 DIAGNOSIS — J03.90 TONSILLITIS: Primary | ICD-10-CM

## 2024-01-20 DIAGNOSIS — R50.9 FEVER, UNSPECIFIED FEVER CAUSE: ICD-10-CM

## 2024-01-20 RX ORDER — AMOXICILLIN 500 MG/1
500 CAPSULE ORAL 2 TIMES DAILY
Qty: 20 CAPSULE | Refills: 0 | Status: SHIPPED | OUTPATIENT
Start: 2024-01-20 | End: 2024-01-30

## 2024-01-20 RX ORDER — PREDNISONE 10 MG/1
TABLET ORAL DAILY
Qty: 21 EACH | Refills: 0 | Status: SHIPPED | OUTPATIENT
Start: 2024-01-20 | End: 2024-01-26

## 2024-01-20 NOTE — LETTER
January 20, 2024     Patient: Yoli Regan   YOB: 1995   Date of Visit: 1/20/2024       To Whom It May Concern:    It is my medical opinion that Yoli Regan may return to work on Monday 1/22/2024.           Sincerely,    WILLIE Vyas    CC:   No Recipients

## 2024-01-20 NOTE — PROGRESS NOTES
Subjective   Chief Complaint   Patient presents with    Fever    Sore Throat       Yoli Regan is a 28 y.o. female.     History of Present Illness  Patient reports fatigue, nausea and decreased appetite that started yesterday.  She woke up in the middle of the night with a fever of 101, chills, sore throat and swollen tonsils.  Left tonsil is more swollen than the right.  Throat is red.  She is certain she has strep throat.  Unknown sick contacts.  Sore Throat   This is a new problem. The current episode started yesterday. The problem has been unchanged. The maximum temperature recorded prior to her arrival was 100 - 101 F. The fever has been present for Less than 1 day. Associated symptoms include swollen glands and trouble swallowing. Pertinent negatives include no abdominal pain, congestion, coughing, diarrhea, drooling, ear pain, headaches, hoarse voice, neck pain, shortness of breath or vomiting.        No Known Allergies    Past Medical History:   Diagnosis Date    Allergic 2000    Asthma     GERD (gastroesophageal reflux disease)     Obesity 2016    Pneumonia 2017    Sleep apnea, obstructive     not diagnosed but drs have noticed during hospital stays       Past Surgical History:   Procedure Laterality Date    CHOLECYSTECTOMY         Social History     Socioeconomic History    Marital status: Single   Tobacco Use    Smoking status: Never    Smokeless tobacco: Never   Substance and Sexual Activity    Alcohol use: No    Drug use: No    Sexual activity: Yes     Partners: Male     Birth control/protection: None       Family History   Problem Relation Age of Onset    Asthma Mother     Sleep apnea Mother     Diabetes type II Father     Coronary artery disease Maternal Grandmother     Heart attack Maternal Grandmother     Asthma Paternal Grandmother     Hypertension Paternal Grandmother          Current Outpatient Medications:     acetaminophen (TYLENOL) 325 MG tablet, Take 2 tablets by mouth Every 4 (Four)  Hours As Needed for Mild Pain ., Disp: , Rfl:     albuterol (PROVENTIL) (2.5 MG/3ML) 0.083% nebulizer solution, Inhale 1 vial VIA nebulizer every 4 hours as needed for wheezing, Disp: 180 mL, Rfl: 4    albuterol (PROVENTIL) (2.5 MG/3ML) 0.083% nebulizer solution, Take 2.5 mg by nebulization Every 4 (Four) Hours As Needed for Wheezing., Disp: 24 each, Rfl: 0    albuterol sulfate  (90 Base) MCG/ACT inhaler, Inhale 2 puffs Every 4 (Four) Hours As Needed for Wheezing., Disp: 18 g, Rfl: 5    amoxicillin (AMOXIL) 500 MG capsule, Take 1 capsule by mouth 2 (Two) Times a Day for 10 days., Disp: 20 capsule, Rfl: 0    Ascorbic Acid (Vitamin C) 500 MG capsule, Take 1 capsule by mouth Daily., Disp: , Rfl:     chlorhexidine (Peridex) 0.12 % solution, Apply 15 mL to the mouth or throat 2 (Two) Times a Day., Disp: 473 mL, Rfl: 0    Fluticasone-Umeclidin-Vilant (Trelegy Ellipta) 200-62.5-25 MCG/ACT aerosol powder , INHALE 1 PUFF BY MOUTH DAILY, Disp: 60 each, Rfl: 4    ipratropium-albuterol (DUO-NEB) 0.5-2.5 mg/3 ml nebulizer, Take 3 mL by nebulization Every 4 (Four) Hours., Disp: 360 mL, Rfl: 0    montelukast (SINGULAIR) 10 MG tablet, TAKE ONE TABLET BY MOUTH ONCE NIGHTLY, Disp: 90 tablet, Rfl: 1    omeprazole OTC (PriLOSEC OTC) 20 MG EC tablet, Take 1 tablet by mouth Daily As Needed (reflux symptoms). Take ONLY WHEN ON STEROIDS, Disp: 30 tablet, Rfl: 0    predniSONE (DELTASONE) 10 MG (21) dose pack, Take  by mouth Daily for 6 days., Disp: 21 each, Rfl: 0      Review of Systems   Constitutional:  Positive for chills, diaphoresis, fatigue and fever.   HENT:  Positive for sore throat, swollen glands and trouble swallowing. Negative for congestion, drooling, ear pain and hoarse voice.    Respiratory:  Negative for cough, chest tightness, shortness of breath and wheezing.    Gastrointestinal:  Positive for nausea. Negative for abdominal pain, diarrhea and vomiting.   Musculoskeletal:  Negative for neck pain.   Neurological:   Negative for headache.        There were no vitals filed for this visit.    Objective   Physical Exam  Constitutional:       General: She is not in acute distress.     Appearance: Normal appearance. She is not ill-appearing, toxic-appearing or diaphoretic.   HENT:      Head: Normocephalic.      Nose: Nose normal.      Mouth/Throat:      Lips: Pink.      Mouth: Mucous membranes are moist.      Pharynx: Posterior oropharyngeal erythema present.      Tonsils: No tonsillar exudate.      Comments: Per pt    Pulmonary:      Effort: Pulmonary effort is normal.   Lymphadenopathy:      Cervical: Cervical adenopathy (per pt) present.   Neurological:      Mental Status: She is alert and oriented to person, place, and time.          Procedures     Assessment & Plan   Diagnoses and all orders for this visit:    1. Tonsillitis (Primary)  -     amoxicillin (AMOXIL) 500 MG capsule; Take 1 capsule by mouth 2 (Two) Times a Day for 10 days.  Dispense: 20 capsule; Refill: 0  -     predniSONE (DELTASONE) 10 MG (21) dose pack; Take  by mouth Daily for 6 days.  Dispense: 21 each; Refill: 0    2. Fever, unspecified fever cause      Change toothbrush after 1 to 2 days on antibiotics.  Alternate tylenol and motrin for pain and/or fever, stay hydrated and rest.     If symptoms worsen or do not improve follow up with your PCP or visit your nearest Urgent Care Center or ER.      PLAN: Discussed dosing, side effects, recommended other symptomatic care.  Patient should follow up with primary care provider, Urgent Care or ER if symptoms worsen, fail to resolve or other symptoms need attention. Patient/family agree to the above.         WILLIE Vyas     The use of a video visit has been reviewed with the patient and verbal informed consent has been obtained. Myself and Yoli Regan participated in this visit. The patient is located at [address] [place] [address]. I am located in [place], [place]. Mychart and Zoom were  utilized.        This visit was performed via Telehealth.  This patient has been instructed to follow-up with their primary care provider if their symptoms worsen or the treatment provided does not resolve their illness.

## 2024-08-26 ENCOUNTER — OFFICE VISIT (OUTPATIENT)
Dept: PULMONOLOGY | Facility: CLINIC | Age: 29
End: 2024-08-26
Payer: COMMERCIAL

## 2024-08-26 VITALS
TEMPERATURE: 97.2 F | HEART RATE: 102 BPM | WEIGHT: 247 LBS | BODY MASS INDEX: 48.49 KG/M2 | SYSTOLIC BLOOD PRESSURE: 132 MMHG | HEIGHT: 60 IN | OXYGEN SATURATION: 98 % | DIASTOLIC BLOOD PRESSURE: 80 MMHG

## 2024-08-26 DIAGNOSIS — Z91.09 ENVIRONMENTAL ALLERGIES: ICD-10-CM

## 2024-08-26 DIAGNOSIS — J45.50 SEVERE PERSISTENT ASTHMA WITHOUT COMPLICATION: Primary | ICD-10-CM

## 2024-08-26 PROCEDURE — 99214 OFFICE O/P EST MOD 30 MIN: CPT | Performed by: NURSE PRACTITIONER

## 2024-08-26 RX ORDER — FLUTICASONE PROPIONATE AND SALMETEROL 250; 50 UG/1; UG/1
1 POWDER RESPIRATORY (INHALATION)
Qty: 60 EACH | Refills: 5 | Status: SHIPPED | OUTPATIENT
Start: 2024-08-26

## 2024-08-26 RX ORDER — PREDNISONE 10 MG/1
TABLET ORAL
Qty: 31 TABLET | Refills: 0 | Status: SHIPPED | OUTPATIENT
Start: 2024-08-26

## 2024-08-26 RX ORDER — ALBUTEROL SULFATE 0.83 MG/ML
2.5 SOLUTION RESPIRATORY (INHALATION) EVERY 4 HOURS PRN
Qty: 120 EACH | Refills: 3 | Status: SHIPPED | OUTPATIENT
Start: 2024-08-26 | End: 2024-08-30

## 2024-08-26 RX ORDER — FLUTICASONE FUROATE, UMECLIDINIUM BROMIDE AND VILANTEROL TRIFENATATE 200; 62.5; 25 UG/1; UG/1; UG/1
1 POWDER RESPIRATORY (INHALATION) DAILY
Qty: 60 EACH | Refills: 4 | Status: SHIPPED | OUTPATIENT
Start: 2024-08-26 | End: 2024-08-26

## 2024-08-26 RX ORDER — ALBUTEROL SULFATE 90 UG/1
2 AEROSOL, METERED RESPIRATORY (INHALATION) EVERY 4 HOURS PRN
Qty: 18 G | Refills: 5 | Status: SHIPPED | OUTPATIENT
Start: 2024-08-26 | End: 2024-08-30

## 2024-08-26 NOTE — PROGRESS NOTES
"[facility] Pulmonary Follow up      Chief Complaint  Asthma    Subjective          Yoli Regan presents to [facility] for follow-up on her asthma.  She is doing fairly well in the last year.  She moved to a different apartment and is having much less symptoms and exacerbations.    Unfortunately she did lose her insurance coverage so stopped the Dupixent.  She has not been taking any maintenance inhalers lately but has been using her nebulizers or albuterol HFA at times.    She still gets some tightness and wheezing especially around any allergies.      Objective     Vital Signs:   /80   Pulse 102   Temp 97.2 °F (36.2 °C) (Infrared)   Ht 152.4 cm (60\")   Wt 112 kg (247 lb)   SpO2 98% Comment: room air at rest  BMI 48.24 kg/m²       Immunization History   Administered Date(s) Administered    Covid-19 (Pfizer) Gray Cap Monovalent 08/03/2022, 08/24/2022    Flu Vaccine Split Quad 10/19/2016, 11/11/2020    Fluzone (or Fluarix & Flulaval for VFC) >6mos 11/29/2018, 01/09/2020, 11/11/2020, 10/12/2023    HPV Quadrivalent 03/24/2009, 05/29/2009    Hep A, 2 Dose 02/24/2009    Hepatitis B 11/29/2018, 01/09/2020    MCV4 Unspecified 02/24/2009    Meningococcal MCV4P (Menactra) 02/24/2009    Pneumococcal Polysaccharide (PPSV23) 10/19/2016, 11/11/2020    Tdap 02/24/2009    Varicella 02/24/2009       Physical Exam  Vitals reviewed.   Constitutional:       General: She is not in acute distress.     Appearance: She is well-developed.   HENT:      Head: Normocephalic and atraumatic.   Eyes:      Pupils: Pupils are equal, round, and reactive to light.   Cardiovascular:      Rate and Rhythm: Normal rate and regular rhythm.      Heart sounds: No murmur heard.  Pulmonary:      Effort: Pulmonary effort is normal. No respiratory distress.      Breath sounds: Wheezing present. No rales.   Abdominal:      General: Bowel sounds are normal. There is no distension.      " Palpations: Abdomen is soft.   Musculoskeletal:         General: Normal range of motion.      Cervical back: Normal range of motion and neck supple.   Skin:     General: Skin is warm and dry.      Findings: No erythema.   Neurological:      Mental Status: She is alert and oriented to person, place, and time.   Psychiatric:         Behavior: Behavior normal.          Result Review :                   Assessment and Plan    Problem List Items Addressed This Visit          Allergies and Adverse Reactions    Environmental allergies       Pulmonary and Pneumonias    Severe persistent asthma without complication - Primary    Relevant Medications    Fluticasone-Salmeterol (ADVAIR/WIXELA) 250-50 MCG/ACT DISKUS    albuterol sulfate  (90 Base) MCG/ACT inhaler    albuterol (PROVENTIL) (2.5 MG/3ML) 0.083% nebulizer solution    Other Relevant Orders    Home Nebulizer     I will go ahead and restart her on chronic maintenance therapy.  She will go ahead and start on Advair, that seems to be better covered on her insurance.    Continue on her albuterol HFA or nebulizer as needed.  Continue on her Singulair.    [place] so I will give her a course of antibiotics and steroids to use if needed.    She will be back in 3 to 4 months for full PFTs and a chest x-ray.        Follow Up     Return in about 3 months (around 11/26/2024).  Patient was given instructions and counseling regarding her condition or for health maintenance advice. Please see specific information pulled into the AVS if appropriate.     Moderate level of Medical Decision Making complexity based on 2 or more chronic stable illnesses and prescription drug management.    Debra Gordon APRN, ACNP  [facility]  Electronically signed

## 2024-08-30 RX ORDER — ALBUTEROL SULFATE 90 UG/1
2 AEROSOL, METERED RESPIRATORY (INHALATION) EVERY 4 HOURS PRN
Qty: 18 G | Refills: 5 | Status: SHIPPED | OUTPATIENT
Start: 2024-08-30

## 2024-08-30 RX ORDER — ALBUTEROL SULFATE 0.83 MG/ML
2.5 SOLUTION RESPIRATORY (INHALATION) EVERY 4 HOURS PRN
Qty: 120 EACH | Refills: 3 | Status: SHIPPED | OUTPATIENT
Start: 2024-08-30

## 2024-09-07 ENCOUNTER — HOSPITAL ENCOUNTER (EMERGENCY)
Facility: HOSPITAL | Age: 29
Discharge: HOME | End: 2024-09-07
Attending: EMERGENCY MEDICINE
Payer: COMMERCIAL

## 2024-09-07 ENCOUNTER — APPOINTMENT (OUTPATIENT)
Dept: GENERAL RADIOLOGY | Facility: HOSPITAL | Age: 29
End: 2024-09-07
Payer: COMMERCIAL

## 2024-09-07 VITALS
DIASTOLIC BLOOD PRESSURE: 65 MMHG | HEIGHT: 60 IN | HEART RATE: 72 BPM | SYSTOLIC BLOOD PRESSURE: 117 MMHG | WEIGHT: 250 LBS | TEMPERATURE: 98 F | BODY MASS INDEX: 49.08 KG/M2 | RESPIRATION RATE: 18 BRPM | OXYGEN SATURATION: 98 %

## 2024-09-07 DIAGNOSIS — B34.8 RHINOVIRUS: ICD-10-CM

## 2024-09-07 DIAGNOSIS — R06.02 SOB (SHORTNESS OF BREATH): Primary | ICD-10-CM

## 2024-09-07 DIAGNOSIS — J45.901 EXACERBATION OF ASTHMA, UNSPECIFIED ASTHMA SEVERITY, UNSPECIFIED WHETHER PERSISTENT: ICD-10-CM

## 2024-09-07 LAB
ALBUMIN SERPL-MCNC: 4 G/DL (ref 3.5–5.2)
ALBUMIN/GLOB SERPL: 1.5 G/DL
ALP SERPL-CCNC: 60 U/L (ref 39–117)
ALT SERPL W P-5'-P-CCNC: 21 U/L (ref 1–33)
ANION GAP SERPL CALCULATED.3IONS-SCNC: 11 MMOL/L (ref 5–15)
AST SERPL-CCNC: 21 U/L (ref 1–32)
B PARAPERT DNA SPEC QL NAA+PROBE: NOT DETECTED
B PERT DNA SPEC QL NAA+PROBE: NOT DETECTED
BASOPHILS # BLD AUTO: 0.08 10*3/MM3 (ref 0–0.2)
BASOPHILS NFR BLD AUTO: 0.7 % (ref 0–1.5)
BILIRUB SERPL-MCNC: 0.4 MG/DL (ref 0–1.2)
BUN SERPL-MCNC: 8 MG/DL (ref 6–20)
BUN/CREAT SERPL: 13.6 (ref 7–25)
C PNEUM DNA NPH QL NAA+NON-PROBE: NOT DETECTED
CALCIUM SPEC-SCNC: 8.8 MG/DL (ref 8.6–10.5)
CHLORIDE SERPL-SCNC: 104 MMOL/L (ref 98–107)
CO2 SERPL-SCNC: 24 MMOL/L (ref 22–29)
CREAT SERPL-MCNC: 0.59 MG/DL (ref 0.57–1)
D DIMER PPP FEU-MCNC: 0.29 MCGFEU/ML (ref 0–0.5)
DEPRECATED RDW RBC AUTO: 39.8 FL (ref 37–54)
EGFRCR SERPLBLD CKD-EPI 2021: 126.1 ML/MIN/1.73
EOSINOPHIL # BLD AUTO: 0.06 10*3/MM3 (ref 0–0.4)
EOSINOPHIL NFR BLD AUTO: 0.5 % (ref 0.3–6.2)
ERYTHROCYTE [DISTWIDTH] IN BLOOD BY AUTOMATED COUNT: 14.8 % (ref 12.3–15.4)
FLUAV SUBTYP SPEC NAA+PROBE: NOT DETECTED
FLUBV RNA ISLT QL NAA+PROBE: NOT DETECTED
GLOBULIN UR ELPH-MCNC: 2.6 GM/DL
GLUCOSE SERPL-MCNC: 92 MG/DL (ref 65–99)
HADV DNA SPEC NAA+PROBE: NOT DETECTED
HCOV 229E RNA SPEC QL NAA+PROBE: NOT DETECTED
HCOV HKU1 RNA SPEC QL NAA+PROBE: NOT DETECTED
HCOV NL63 RNA SPEC QL NAA+PROBE: NOT DETECTED
HCOV OC43 RNA SPEC QL NAA+PROBE: NOT DETECTED
HCT VFR BLD AUTO: 44.1 % (ref 34–46.6)
HGB BLD-MCNC: 13.4 G/DL (ref 12–15.9)
HMPV RNA NPH QL NAA+NON-PROBE: NOT DETECTED
HPIV1 RNA ISLT QL NAA+PROBE: NOT DETECTED
HPIV2 RNA SPEC QL NAA+PROBE: NOT DETECTED
HPIV3 RNA NPH QL NAA+PROBE: NOT DETECTED
HPIV4 P GENE NPH QL NAA+PROBE: NOT DETECTED
IMM GRANULOCYTES # BLD AUTO: 0.13 10*3/MM3 (ref 0–0.05)
IMM GRANULOCYTES NFR BLD AUTO: 1.1 % (ref 0–0.5)
LYMPHOCYTES # BLD AUTO: 2.09 10*3/MM3 (ref 0.7–3.1)
LYMPHOCYTES NFR BLD AUTO: 17.4 % (ref 19.6–45.3)
M PNEUMO IGG SER IA-ACNC: NOT DETECTED
MAGNESIUM SERPL-MCNC: 2.3 MG/DL (ref 1.6–2.6)
MCH RBC QN AUTO: 23.1 PG (ref 26.6–33)
MCHC RBC AUTO-ENTMCNC: 30.4 G/DL (ref 31.5–35.7)
MCV RBC AUTO: 75.9 FL (ref 79–97)
MONOCYTES # BLD AUTO: 1.01 10*3/MM3 (ref 0.1–0.9)
MONOCYTES NFR BLD AUTO: 8.4 % (ref 5–12)
NEUTROPHILS NFR BLD AUTO: 71.9 % (ref 42.7–76)
NEUTROPHILS NFR BLD AUTO: 8.67 10*3/MM3 (ref 1.7–7)
NRBC BLD AUTO-RTO: 0 /100 WBC (ref 0–0.2)
PLATELET # BLD AUTO: 390 10*3/MM3 (ref 140–450)
PMV BLD AUTO: 11.6 FL (ref 6–12)
POTASSIUM SERPL-SCNC: 3.8 MMOL/L (ref 3.5–5.2)
PROT SERPL-MCNC: 6.6 G/DL (ref 6–8.5)
QT INTERVAL: 394 MS
QTC INTERVAL: 425 MS
RBC # BLD AUTO: 5.81 10*6/MM3 (ref 3.77–5.28)
RHINOVIRUS RNA SPEC NAA+PROBE: DETECTED
RSV RNA NPH QL NAA+NON-PROBE: NOT DETECTED
SARS-COV-2 RNA NPH QL NAA+NON-PROBE: NOT DETECTED
SODIUM SERPL-SCNC: 139 MMOL/L (ref 136–145)
TROPONIN T SERPL HS-MCNC: <6 NG/L
WBC NRBC COR # BLD AUTO: 12.04 10*3/MM3 (ref 3.4–10.8)

## 2024-09-07 PROCEDURE — 71045 X-RAY EXAM CHEST 1 VIEW: CPT

## 2024-09-07 PROCEDURE — 85379 FIBRIN DEGRADATION QUANT: CPT | Performed by: NURSE PRACTITIONER

## 2024-09-07 PROCEDURE — 83735 ASSAY OF MAGNESIUM: CPT | Performed by: NURSE PRACTITIONER

## 2024-09-07 PROCEDURE — 94640 AIRWAY INHALATION TREATMENT: CPT

## 2024-09-07 PROCEDURE — 93005 ELECTROCARDIOGRAM TRACING: CPT

## 2024-09-07 PROCEDURE — 25010000002 METHYLPREDNISOLONE PER 125 MG: Performed by: NURSE PRACTITIONER

## 2024-09-07 PROCEDURE — 94761 N-INVAS EAR/PLS OXIMETRY MLT: CPT

## 2024-09-07 PROCEDURE — 96374 THER/PROPH/DIAG INJ IV PUSH: CPT

## 2024-09-07 PROCEDURE — 80053 COMPREHEN METABOLIC PANEL: CPT | Performed by: NURSE PRACTITIONER

## 2024-09-07 PROCEDURE — 0202U NFCT DS 22 TRGT SARS-COV-2: CPT | Performed by: NURSE PRACTITIONER

## 2024-09-07 PROCEDURE — 36415 COLL VENOUS BLD VENIPUNCTURE: CPT

## 2024-09-07 PROCEDURE — 84484 ASSAY OF TROPONIN QUANT: CPT | Performed by: NURSE PRACTITIONER

## 2024-09-07 PROCEDURE — 85025 COMPLETE CBC W/AUTO DIFF WBC: CPT | Performed by: NURSE PRACTITIONER

## 2024-09-07 PROCEDURE — 99284 EMERGENCY DEPT VISIT MOD MDM: CPT

## 2024-09-07 PROCEDURE — 93005 ELECTROCARDIOGRAM TRACING: CPT | Performed by: EMERGENCY MEDICINE

## 2024-09-07 RX ORDER — IPRATROPIUM BROMIDE AND ALBUTEROL SULFATE 2.5; .5 MG/3ML; MG/3ML
6 SOLUTION RESPIRATORY (INHALATION) ONCE
Status: COMPLETED | OUTPATIENT
Start: 2024-09-07 | End: 2024-09-07

## 2024-09-07 RX ORDER — METHYLPREDNISOLONE SODIUM SUCCINATE 125 MG/2ML
125 INJECTION, POWDER, LYOPHILIZED, FOR SOLUTION INTRAMUSCULAR; INTRAVENOUS ONCE
Status: COMPLETED | OUTPATIENT
Start: 2024-09-07 | End: 2024-09-07

## 2024-09-07 RX ADMIN — METHYLPREDNISOLONE SODIUM SUCCINATE 125 MG: 125 INJECTION, POWDER, FOR SOLUTION INTRAMUSCULAR; INTRAVENOUS at 09:07

## 2024-09-07 RX ADMIN — IPRATROPIUM BROMIDE AND ALBUTEROL SULFATE 6 ML: 2.5; .5 SOLUTION RESPIRATORY (INHALATION) at 09:04

## 2024-09-07 NOTE — ED PROVIDER NOTES
EMERGENCY DEPARTMENT ENCOUNTER    Pt Name: Yoli Regan  MRN: [id number]  Pt :   1995  Room Number:    Date of encounter:  2024  PCP: Sandra Guerra APRN  ED Provider: WILLIE Kwong    Historian: Patient      HPI:  Chief Complaint: Shortness of breath        Context: Yoli Regan is a 29 y.o. female who presents to the ED c/o shortness of breath worsening since this morning.  Patient reports history of asthma, controlled with albuterol, Advair, Singulair.  Reports flareup over the past couple of days, was seen at PCP and currently taking along steroid taper.  This morning she woke up with worsening of shortness of breath, chest tightness and pressure, pleuritic chest pain.  Denies fever, hemoptysis, history of smoking, oral contraceptives, personal history of PE or DVT      PAST MEDICAL HISTORY  Past Medical History:   Diagnosis Date    Allergic     Asthma     GERD (gastroesophageal reflux disease)     Obesity 2016    Pneumonia 2017    Sleep apnea, obstructive     not diagnosed but drs have noticed during hospital stays         PAST SURGICAL HISTORY  Past Surgical History:   Procedure Laterality Date    CHOLECYSTECTOMY           FAMILY HISTORY  Family History   Problem Relation Age of Onset    Asthma Mother     Sleep apnea Mother     Diabetes type II Father     Coronary artery disease Maternal Grandmother     Heart attack Maternal Grandmother     Asthma Paternal Grandmother     Hypertension Paternal Grandmother          SOCIAL HISTORY  Social History     Socioeconomic History    Marital status: Single   Tobacco Use    Smoking status: Never     Passive exposure: Never    Smokeless tobacco: Never   Vaping Use    Vaping status: Never Used   Substance and Sexual Activity    Alcohol use: No    Drug use: No    Sexual activity: Yes     Partners: Male     Birth control/protection: None         ALLERGIES  Patient has no known allergies.        REVIEW OF SYSTEMS  Review of  Systems       All systems reviewed and negative except for those discussed in HPI.       PHYSICAL EXAM    I have reviewed the triage vital signs and nursing notes.    ED Triage Vitals [09/07/24 0820]   Temp Heart Rate Resp BP SpO2   98 °F (36.7 °C) 83 16 114/65 95 %      Temp src Heart Rate Source Patient Position BP Location FiO2 (%)   Oral Monitor Sitting Left arm --       Physical Exam  GENERAL:   Appears in no acute distress. Obese  HENT: Nares patent.  EYES: No scleral icterus.  CV: Regular rhythm, regular rate.  RESPIRATORY: Normal effort.  Scattered wheezes throughout upper lung fields auscultated anteriorly  ABDOMEN: Soft, nontender  MUSCULOSKELETAL: No deformities.   NEURO: Alert, moves all extremities, follows commands.  SKIN: Warm, dry, no rash visualized.      LAB RESULTS  Recent Results (from the past 24 hour(s))   ECG 12 Lead Dyspnea    Collection Time: 09/07/24  8:27 AM   Result Value Ref Range    QT Interval 394 ms    QTC Interval 425 ms   Respiratory Panel PCR w/COVID-19(SARS-CoV-2) CYNTHIA/STEPHANE/DESTINEY/PAD/COR/CARRIE In-House, NP Swab in [place], 2 HR TAT - Swab, Nasopharynx    Collection Time: 09/07/24  8:50 AM    Specimen: Nasopharynx; Swab   Result Value Ref Range    ADENOVIRUS, PCR Not Detected Not Detected    Coronavirus 229E Not Detected Not Detected    Coronavirus HKU1 Not Detected Not Detected    Coronavirus NL63 Not Detected Not Detected    Coronavirus OC43 Not Detected Not Detected    COVID19 Not Detected Not Detected - Ref. Range    Human Metapneumovirus Not Detected Not Detected    Human Rhinovirus/Enterovirus Detected (A) Not Detected    Influenza A PCR Not Detected Not Detected    Influenza B PCR Not Detected Not Detected    Parainfluenza Virus 1 Not Detected Not Detected    Parainfluenza Virus 2 Not Detected Not Detected    Parainfluenza Virus 3 Not Detected Not Detected    Parainfluenza Virus 4 Not Detected Not Detected    RSV, PCR Not Detected Not Detected    Bordetella pertussis pcr Not  Detected Not Detected    Bordetella parapertussis PCR Not Detected Not Detected    Chlamydophila pneumoniae PCR Not Detected Not Detected    Mycoplasma pneumo by PCR Not Detected Not Detected   D-dimer, Quantitative    Collection Time: 09/07/24  9:02 AM    Specimen: Blood   Result Value Ref Range    D-Dimer, Quantitative 0.29 0.00 - 0.50 MCGFEU/mL   CBC Auto Differential    Collection Time: 09/07/24  9:02 AM    Specimen: Blood   Result Value Ref Range    WBC 12.04 (H) 3.40 - 10.80 10*3/mm3    RBC 5.81 (H) 3.77 - 5.28 10*6/mm3    Hemoglobin 13.4 12.0 - 15.9 g/dL    Hematocrit 44.1 34.0 - 46.6 %    MCV 75.9 (L) 79.0 - 97.0 fL    MCH 23.1 (L) 26.6 - 33.0 pg    MCHC 30.4 (L) 31.5 - 35.7 g/dL    RDW 14.8 12.3 - 15.4 %    RDW-SD 39.8 37.0 - 54.0 fl    MPV 11.6 6.0 - 12.0 fL    Platelets 390 140 - 450 10*3/mm3    Neutrophil % 71.9 42.7 - 76.0 %    Lymphocyte % 17.4 (L) 19.6 - 45.3 %    Monocyte % 8.4 5.0 - 12.0 %    Eosinophil % 0.5 0.3 - 6.2 %    Basophil % 0.7 0.0 - 1.5 %    Immature Grans % 1.1 (H) 0.0 - 0.5 %    Neutrophils, Absolute 8.67 (H) 1.70 - 7.00 10*3/mm3    Lymphocytes, Absolute 2.09 0.70 - 3.10 10*3/mm3    Monocytes, Absolute 1.01 (H) 0.10 - 0.90 10*3/mm3    Eosinophils, Absolute 0.06 0.00 - 0.40 10*3/mm3    Basophils, Absolute 0.08 0.00 - 0.20 10*3/mm3    Immature Grans, Absolute 0.13 (H) 0.00 - 0.05 10*3/mm3    nRBC 0.0 0.0 - 0.2 /100 WBC   Comprehensive Metabolic Panel    Collection Time: 09/07/24  9:24 AM    Specimen: Blood   Result Value Ref Range    Glucose 92 65 - 99 mg/dL    BUN 8 6 - 20 mg/dL    Creatinine 0.59 0.57 - 1.00 mg/dL    Sodium 139 136 - 145 mmol/L    Potassium 3.8 3.5 - 5.2 mmol/L    Chloride 104 98 - 107 mmol/L    CO2 24.0 22.0 - 29.0 mmol/L    Calcium 8.8 8.6 - 10.5 mg/dL    Total Protein 6.6 6.0 - 8.5 g/dL    Albumin 4.0 3.5 - 5.2 g/dL    ALT (SGPT) 21 1 - 33 U/L    AST (SGOT) 21 1 - 32 U/L    Alkaline Phosphatase 60 39 - 117 U/L    Total Bilirubin 0.4 0.0 - 1.2 mg/dL    Globulin 2.6  gm/dL    A/G Ratio 1.5 g/dL    BUN/Creatinine Ratio 13.6 7.0 - 25.0    Anion Gap 11.0 5.0 - 15.0 mmol/L    eGFR 126.1 >60.0 mL/min/1.73   Single High Sensitivity Troponin T    Collection Time: 09/07/24  9:24 AM    Specimen: Blood   Result Value Ref Range    HS Troponin T <6 <14 ng/L   Magnesium    Collection Time: 09/07/24  9:24 AM    Specimen: Blood   Result Value Ref Range    Magnesium 2.3 1.6 - 2.6 mg/dL       If labs were ordered, I independently reviewed the results and considered them in treating the patient.        RADIOLOGY  XR Chest 1 View    Result Date: 9/7/2024  XR CHEST 1 VW Date of Exam: 9/7/2024 8:38 AM EDT Indication: cough Comparison: Chest radiograph 9/14/2023. Findings: Cardiomediastinal silhouette is within normal limits. Low lung volumes with hypoventilatory change. No focal consolidation. No pleural effusion or pneumothorax. Osseous structures are unremarkable.     Impression: Low lung volumes with hypoventilatory change. No acute cardiopulmonary findings otherwise. Electronically Signed: Pedro Sanchez MD  9/7/2024 8:59 AM EDT  Workstation ID: [id number]     I ordered and independently reviewed the above noted radiographic studies.      I viewed images of chest x-ray which showed no lobar pneumonia per my independent interpretation.    See radiologist's dictation for official interpretation.        PROCEDURES    Procedures    ECG 12 Lead Dyspnea   Final Result   Test Reason : Dyspnea   Blood Pressure :   */*   mmHG   Vent. Rate :  70 BPM     Atrial Rate :  70 BPM      P-R Int : 158 ms          QRS Dur : 100 ms       QT Int : 394 ms       P-R-T Axes :  31  40   9 degrees      QTc Int : 425 ms      ** Poor data quality, interpretation may be adversely affected   Normal sinus rhythm   Normal ECG   When compared with ECG of 14-SEP-2023 23:31,   No significant change was found   Confirmed by BRENDAN JORDAN MD ([id number]) on 9/7/2024 10:39:02 AM      Referred By:            Confirmed By: BRENDAN JORDAN MD           MEDICATIONS GIVEN IN ER    Medications   ipratropium-albuterol (DUO-NEB) nebulizer solution 6 mL (6 mL Nebulization Given 9/7/24 0904)   methylPREDNISolone sodium succinate (SOLU-Medrol) injection 125 mg (125 mg Intravenous Given 9/7/24 0907)         MEDICAL DECISION MAKING, PROGRESS, and CONSULTS    All labs, if obtained, have been independently reviewed by me.  All radiology studies, if obtained, have been reviewed by me and the radiologist dictating the report.  All EKG's, if obtained, have been independently viewed and interpreted by me/my attending physician.      Discussion below represents my analysis of pertinent findings related to patient's condition, differential diagnosis, treatment plan and final disposition.  Patient is a 29-year-old female with known history of asthma presents for evaluation of worsening of shortness of breath.  On physical exam she was nontoxic-appearing, not hypoxic nor tachycardic.  I noted a few diffused wheezes throughout upper lung fields.  Lab work was obtained significant for negative troponin and D-dimer, leukocytosis 12.04, patient is currently taking steroids, respiratory panel positive for human rhinovirus enterovirus, chest x-ray shows no low lung volume with couple ventilatory change.  Patient received DuoNeb treatment with improvement of lung sounds and her symptoms.  Solu-Medrol IV.  Patient discharged home in stable condition, advised close follow-up with PCP and return precautions for any new or worsening symptoms.                       Differential diagnosis:    Asthma exacerbation,      Additional sources:    - Discussed/ obtained information from independent historians:      - External (non-ED) record review: August 26, office visit with pulmonology     - Chronic or social conditions impacting care: Asthma, obesity    - Shared decision making: Patient      Orders placed during this visit:  Orders Placed This Encounter   Procedures    Respiratory Panel PCR  w/COVID-19(SARS-CoV-2) CYNTHIA/STEPHANE/DESTINEY/PAD/COR/CARRIE In-House, NP Swab in UTM/VTM, 2 HR TAT - Swab, Nasopharynx    XR Chest 1 View    Comprehensive Metabolic Panel    Single High Sensitivity Troponin T    Magnesium    D-dimer, Quantitative    CBC Auto Differential    ECG 12 Lead Dyspnea    CBC & Differential         Additional orders considered but not ordered:  CT chest    ED Course:    Consultants:                  Shared Decision Making:  After my consideration of clinical presentation and any laboratory/radiology studies obtained, I discussed the findings with the patient/patient representative who is in agreement with the treatment plan and the final disposition.   Risks and benefits of discharge and/or observation/admission were discussed.       AS OF 15:29 EDT VITALS:    BP - 117/65  HR - 72  TEMP - 98 °F (36.7 °C) (Oral)  O2 SATS - 98%                  DIAGNOSIS  Final diagnoses:   SOB (shortness of breath)   Exacerbation of asthma, unspecified asthma severity, unspecified whether persistent   Rhinovirus         DISPOSITION  DISCHARGE    Patient discharged in stable condition.    Reviewed implications of results, diagnosis, meds, responsibility to follow up, warning signs and symptoms of possible worsening, potential complications and reasons to return to ER.    Patient/Family voiced understanding of above instructions.    Discussed plan for discharge, as there is no emergent indication for admission.  Pt/family is agreeable and understands need for follow up and possible repeat testing.  Pt/family is aware that discharge does not mean that nothing is wrong but that it indicates no emergency is currently present that requires admission and they must continue care with follow-up as given below or with a physician of their choice.     FOLLOW-UP  Sandra Guerra APRN  [address]  [address]  [facility] [address] [phone]          [facility] EMERGENCY DEPARTMENT  [address]  [facility] [address] [phone]    If symptoms worsen         Medication List      No changes were made to your prescriptions during this visit.            Please note that portions of this document were completed with voice recognition software.     WILLIE Kwong   09/07/24   15:29 EDT        Abigail Sharma, APRN 09/07/24 1529

## 2024-09-07 NOTE — Clinical Note
[facility] EMERGENCY DEPARTMENT  [address]  [facility] [address]  Phone: [phone]    Yoli Regan was seen and treated in our emergency department on 9/7/2024.  She may return to work on 09/09/2024.         Thank you for choosing [facility].    Marquez Kumar MD

## 2024-09-13 ENCOUNTER — TELEPHONE (OUTPATIENT)
Dept: PULMONOLOGY | Facility: CLINIC | Age: 29
End: 2024-09-13
Payer: COMMERCIAL

## 2024-09-13 NOTE — TELEPHONE ENCOUNTER
PA request for Rx Trelegy 200 is not needed at this time. Pt is currently on Advair Diskus 250/50.

## 2024-09-23 ENCOUNTER — HOSPITAL ENCOUNTER (EMERGENCY)
Facility: HOSPITAL | Age: 29
Discharge: HOME | End: 2024-09-23
Attending: EMERGENCY MEDICINE
Payer: COMMERCIAL

## 2024-09-23 VITALS
WEIGHT: 250 LBS | SYSTOLIC BLOOD PRESSURE: 127 MMHG | BODY MASS INDEX: 49.08 KG/M2 | TEMPERATURE: 98.3 F | RESPIRATION RATE: 18 BRPM | OXYGEN SATURATION: 95 % | HEART RATE: 84 BPM | HEIGHT: 60 IN | DIASTOLIC BLOOD PRESSURE: 72 MMHG

## 2024-09-23 DIAGNOSIS — Z87.09 HISTORY OF ASTHMA: ICD-10-CM

## 2024-09-23 DIAGNOSIS — T78.2XXA ANAPHYLAXIS, INITIAL ENCOUNTER: Primary | ICD-10-CM

## 2024-09-23 DIAGNOSIS — T78.3XXA ANGIOEDEMA, INITIAL ENCOUNTER: ICD-10-CM

## 2024-09-23 PROCEDURE — 94640 AIRWAY INHALATION TREATMENT: CPT

## 2024-09-23 PROCEDURE — 25010000002 EPINEPHRINE (ANAPHYLAXIS) 1 MG/ML SOLUTION

## 2024-09-23 PROCEDURE — 96372 THER/PROPH/DIAG INJ SC/IM: CPT

## 2024-09-23 PROCEDURE — 25010000002 METHYLPREDNISOLONE PER 125 MG: Performed by: EMERGENCY MEDICINE

## 2024-09-23 PROCEDURE — 25010000002 DIPHENHYDRAMINE PER 50 MG: Performed by: EMERGENCY MEDICINE

## 2024-09-23 PROCEDURE — 99285 EMERGENCY DEPT VISIT HI MDM: CPT

## 2024-09-23 PROCEDURE — 96375 TX/PRO/DX INJ NEW DRUG ADDON: CPT

## 2024-09-23 PROCEDURE — 94799 UNLISTED PULMONARY SVC/PX: CPT

## 2024-09-23 PROCEDURE — 96374 THER/PROPH/DIAG INJ IV PUSH: CPT

## 2024-09-23 RX ORDER — EPINEPHRINE 1 MG/ML
0.5 INJECTION, SOLUTION INTRAMUSCULAR; SUBCUTANEOUS ONCE
Status: COMPLETED | OUTPATIENT
Start: 2024-09-23 | End: 2024-09-23

## 2024-09-23 RX ORDER — ALBUTEROL SULFATE 0.83 MG/ML
2.5 SOLUTION RESPIRATORY (INHALATION) ONCE
Status: COMPLETED | OUTPATIENT
Start: 2024-09-23 | End: 2024-09-23

## 2024-09-23 RX ORDER — FAMOTIDINE 10 MG/ML
20 INJECTION, SOLUTION INTRAVENOUS ONCE
Status: COMPLETED | OUTPATIENT
Start: 2024-09-23 | End: 2024-09-23

## 2024-09-23 RX ORDER — WATER 10 ML/10ML
INJECTION INTRAMUSCULAR; INTRAVENOUS; SUBCUTANEOUS
Status: COMPLETED
Start: 2024-09-23 | End: 2024-09-23

## 2024-09-23 RX ORDER — EPINEPHRINE 0.3 MG/.3ML
0.3 INJECTION SUBCUTANEOUS ONCE
Qty: 1 EACH | Refills: 0 | Status: SHIPPED | OUTPATIENT
Start: 2024-09-23 | End: 2024-09-23

## 2024-09-23 RX ORDER — METHYLPREDNISOLONE SODIUM SUCCINATE 125 MG/2ML
125 INJECTION, POWDER, LYOPHILIZED, FOR SOLUTION INTRAMUSCULAR; INTRAVENOUS ONCE
Status: COMPLETED | OUTPATIENT
Start: 2024-09-23 | End: 2024-09-23

## 2024-09-23 RX ORDER — DIPHENHYDRAMINE HYDROCHLORIDE 50 MG/ML
50 INJECTION INTRAMUSCULAR; INTRAVENOUS ONCE
Status: COMPLETED | OUTPATIENT
Start: 2024-09-23 | End: 2024-09-23

## 2024-09-23 RX ORDER — EPINEPHRINE 1 MG/ML
INJECTION, SOLUTION INTRAMUSCULAR; SUBCUTANEOUS
Status: COMPLETED
Start: 2024-09-23 | End: 2024-09-23

## 2024-09-23 RX ADMIN — ALBUTEROL SULFATE 2.5 MG: 2.5 SOLUTION RESPIRATORY (INHALATION) at 19:01

## 2024-09-23 RX ADMIN — EPINEPHRINE 0.5 MG: 1 INJECTION, SOLUTION INTRAMUSCULAR; SUBCUTANEOUS at 18:32

## 2024-09-23 RX ADMIN — DIPHENHYDRAMINE HYDROCHLORIDE 50 MG: 50 INJECTION INTRAMUSCULAR; INTRAVENOUS at 18:39

## 2024-09-23 RX ADMIN — METHYLPREDNISOLONE SODIUM SUCCINATE 125 MG: 125 INJECTION INTRAMUSCULAR; INTRAVENOUS at 18:42

## 2024-09-23 RX ADMIN — EPINEPHRINE 0.5 MG: 1 INJECTION INTRAMUSCULAR; INTRAVENOUS; SUBCUTANEOUS at 18:32

## 2024-09-23 RX ADMIN — FAMOTIDINE 20 MG: 10 INJECTION, SOLUTION INTRAVENOUS at 18:42

## 2024-09-23 RX ADMIN — WATER: 1 INJECTION INTRAMUSCULAR; INTRAVENOUS; SUBCUTANEOUS at 18:42

## 2024-09-23 NOTE — ED PROVIDER NOTES
[facility]    EMERGENCY DEPARTMENT ENCOUNTER      Pt Name: Yoli Regan  MRN: [id number]  YOB: 1995  Date of evaluation: 2024  Provider: Juarez Syed MD    CHIEF COMPLAINT       Chief Complaint   Patient presents with    Allergic Reaction         HISTORY OF PRESENT ILLNESS   Yoli Regan is a 29 y.o. female who presents to the emergency department with sudden onset diffuse itching, swelling around the eyes and tongue, and feeling as if her throat is closing.  Patient reports history of anaphylaxis.  Has EpiPen at home but it is .  She has history of asthma as well.  Denies any abdominal pain or discomfort as well as any nausea, vomiting, or diarrhea.      Nursing notes were reviewed.    REVIEW OF SYSTEMS     ROS:  A chief complaint appropriate review of systems was completed and is negative except as noted in the HPI.      PAST MEDICAL HISTORY     Past Medical History:   Diagnosis Date    Allergic     Asthma     GERD (gastroesophageal reflux disease)     Obesity 2016    Pneumonia 2017    Sleep apnea, obstructive     not diagnosed but drs have noticed during hospital stays         SURGICAL HISTORY       Past Surgical History:   Procedure Laterality Date    CHOLECYSTECTOMY           CURRENT MEDICATIONS       Current Facility-Administered Medications:     sterile water (preservative free) injection  - ADS Override Pull, , , ,     Current Outpatient Medications:     acetaminophen (TYLENOL) 325 MG tablet, Take 2 tablets by mouth Every 4 (Four) Hours As Needed for Mild Pain ., Disp: , Rfl:     albuterol (PROVENTIL) (2.5 MG/3ML) 0.083% nebulizer solution, Take 2.5 mg by nebulization Every 4 (Four) Hours As Needed for Wheezing., Disp: 120 each, Rfl: 3    albuterol sulfate  (90 Base) MCG/ACT inhaler, Inhale 2 puffs Every 4 (Four) Hours As Needed for Wheezing., Disp: 18 g, Rfl: 5    amoxicillin-clavulanate (AUGMENTIN) 875-125 MG per tablet, Take 1 tablet by mouth 2 (Two)  Times a Day., Disp: 20 tablet, Rfl: 0    Ascorbic Acid (Vitamin C) 500 MG capsule, Take 1 capsule by mouth Daily., Disp: , Rfl:     chlorhexidine (Peridex) 0.12 % solution, Apply 15 mL to the mouth or throat 2 (Two) Times a Day., Disp: 473 mL, Rfl: 0    EPINEPHrine (EPIPEN) 0.3 MG/0.3ML solution auto-injector injection, Inject 0.3 mL under the skin into the appropriate area as directed 1 (One) Time for 1 dose., Disp: 1 each, Rfl: 0    Fluticasone-Salmeterol (ADVAIR/WIXELA) 250-50 MCG/ACT DISKUS, Inhale 1 puff 2 (Two) Times a Day., Disp: 60 each, Rfl: 5    ipratropium-albuterol (DUO-NEB) 0.5-2.5 mg/3 ml nebulizer, Take 3 mL by nebulization Every 4 (Four) Hours., Disp: 360 mL, Rfl: 0    montelukast (SINGULAIR) 10 MG tablet, TAKE ONE TABLET BY MOUTH ONCE NIGHTLY, Disp: 90 tablet, Rfl: 1    omeprazole OTC (PriLOSEC OTC) 20 MG EC tablet, Take 1 tablet by mouth Daily As Needed (reflux symptoms). Take ONLY WHEN ON STEROIDS, Disp: 30 tablet, Rfl: 0    predniSONE (DELTASONE) 10 MG tablet, Take 4 tabs daily x 3 days, then take 3 tabs daily x 3 days, then take 2 tabs daily x 3 days, then take 1 tab daily x 3 days, Disp: 31 tablet, Rfl: 0    ALLERGIES     Patient has no known allergies.    FAMILY HISTORY       Family History   Problem Relation Age of Onset    Asthma Mother     Sleep apnea Mother     Diabetes type II Father     Coronary artery disease Maternal Grandmother     Heart attack Maternal Grandmother     Asthma Paternal Grandmother     Hypertension Paternal Grandmother           SOCIAL HISTORY       Social History     Socioeconomic History    Marital status: Single   Tobacco Use    Smoking status: Never     Passive exposure: Never    Smokeless tobacco: Never   Vaping Use    Vaping status: Never Used   Substance and Sexual Activity    Alcohol use: No    Drug use: No    Sexual activity: Yes     Partners: Male     Birth control/protection: None         PHYSICAL EXAM    (up to 7 for level 4, 8 or more for level 5)  "    Vitals:    09/23/24 1822 09/23/24 1901   BP: 138/100 127/72   BP Location: Right arm    Patient Position: Sitting    Pulse: 110 84   Resp: 18 18   Temp: 98.3 °F (36.8 °C)    TempSrc: Oral    SpO2: 91% 95%   Weight: 113 kg (250 lb)    Height: 152.4 cm (60\")        General: Awake, alert, mild distress  HEENT: Conjunctivae normal.  No visible oropharyngeal angioedema.  There is angioedema surrounding the eyes and lips.  Neck: Trachea midline.  Cardiac: Heart regular rate, rhythm, no murmurs, rubs, or gallops  Lungs: Lungs are clear to auscultation, there is no wheezing, rhonchi, or rales. There is no use of accessory muscles.  Chest wall: There is no tenderness to palpation over the chest wall or over ribs  Abdomen: Abdomen is soft, nontender, nondistended. There are no firm or pulsatile masses, no rebound rigidity or guarding.   Musculoskeletal: No deformity.  Neuro: Alert and oriented x 4.  Dermatology: Skin is warm and dry  Psych: Mentation is grossly normal, cognition is grossly normal. Affect is appropriate.        EMERGENCY DEPARTMENT COURSE and DIFFERENTIAL DIAGNOSIS/MDM:   Vitals:  AS OF 20:20 EDT    BP - 127/72  HR - 84  TEMP - 98.3 °F (36.8 °C) (Oral)  O2 SATS - 95%        Discussion below represents my analysis of pertinent findings related to patient's condition, differential diagnosis, treatment plan and final disposition.      Differential diagnosis:  The differential diagnosis associated with the patient's presentation includes: Anaphylaxis, angioedema      Independent interpretations (ECG/rhythm strip/X-ray/US/CT scan): I independently interpreted the patient's cardiac monitor which showed sinus rhythm      Patient's care impacted by:   [] Diabetes   [] Hypertension   [] Coronary Artery Disease   [] Cancer   [x] Other: History of anaphylactic reaction, asthma    Care significantly affected by Social Determinants of Health (housing and economic circumstances, unemployment)    [] Yes     [x] No   If " yes, Patient's care significantly limited by  Social Determinants of Health including:    [] Inadequate housing    [] Low income    [] Alcoholism and drug addiction in family    [] Problems related to primary support group    [] Unemployment    [] Problems related to employment    [] Other Social Determinants of Health:       Consideration of admission/observation vs discharge: Considered admission, however during observation period in the ED, patient had no recurrence of symptoms after complete resolution after treatment and therefore feel that she is stable for discharge home.      I considered prescription management with:    [] Pain medication:   [] Antiviral:   [] Antibiotic:   [x] Other: Prescribed EpiPen    ED Course:    ED Course as of 09/23/24 2020   Mon Sep 23, 2024   2019 On reexamination, patient symptoms are completely resolved.  She is resting comfortably in bed.  Angioedema has resolved and she has no ongoing globus sensation or difficulty breathing.  Presentation consistent with anaphylaxis, resolved with appropriate treatment.  Patient has EpiPen at home that is  and so we will prescribe her a new one.  Counseled her on return precautions and close follow-up with PCP. [NS]      ED Course User Index  [NS] Juarez Syed MD             I had a discussion with the patient/family regarding diagnosis, diagnostic results, treatment plan, and medications.  The patient/family indicated understanding of these instructions.  I spent adequate time at the bedside preceding discharge necessary to personally discuss the aftercare instructions, giving patient education, providing explanations of the results of our evaluations/findings, and my decision making to assure that the patient/family understand the plan of care.  Time was allotted to answer questions at that time and throughout the ED course.  Emphasis was placed on timely follow-up after discharge.  I also discussed the potential for the  development of an acute emergent condition requiring further evaluation, admission, or even surgical intervention. I discussed that we found nothing during the visit today indicating the need for further workup, admission, or the presence of an unstable medical condition.  I encouraged the patient to return to the emergency department immediately for ANY concerns, worsening, new complaints, or if symptoms persist and unable to seek follow-up in a timely fashion.  The patient/family expressed understanding and agreement with this plan.  The patient will follow-up with their PCP in 1-2 days for reevaluation.           CRITICAL CARE TIME    Approximately 31 minutes of discontinuous critical care time was provided to this patient by myself absent of any time spent performing procedures.  Patient presents critically ill with acute anaphylaxis placed in the cardiovascular, respiratory, neurologic, renal systems at risk requiring the following interventions: Administration of intramuscular epinephrine, administration of IV Benadryl/Pepcid/Solu-Medrol, frequent reassessment with the following response: Resolution of anaphylaxis symptoms.  Patient at high risk of deterioration and possibly death without these interventions.      FINAL IMPRESSION      1. Anaphylaxis, initial encounter    2. Angioedema, initial encounter    3. History of asthma          DISPOSITION/PLAN     ED Disposition       ED Disposition   Discharge    Condition   Stable    Comment   --                 Comment: Please note this report has been produced using speech recognition software.      Juarez Syed MD  Attending Emergency Physician             Juarez Syed MD  09/23/24 2023

## 2024-12-05 ENCOUNTER — APPOINTMENT (OUTPATIENT)
Dept: GENERAL RADIOLOGY | Facility: HOSPITAL | Age: 29
End: 2024-12-05
Payer: COMMERCIAL

## 2024-12-05 ENCOUNTER — HOSPITAL ENCOUNTER (EMERGENCY)
Facility: HOSPITAL | Age: 29
Discharge: HOME | End: 2024-12-05
Attending: EMERGENCY MEDICINE
Payer: COMMERCIAL

## 2024-12-05 VITALS
SYSTOLIC BLOOD PRESSURE: 169 MMHG | HEART RATE: 102 BPM | RESPIRATION RATE: 20 BRPM | HEIGHT: 60 IN | OXYGEN SATURATION: 90 % | TEMPERATURE: 98.3 F | WEIGHT: 260 LBS | BODY MASS INDEX: 51.04 KG/M2 | DIASTOLIC BLOOD PRESSURE: 88 MMHG

## 2024-12-05 DIAGNOSIS — J45.41 MODERATE PERSISTENT ASTHMA WITH EXACERBATION: Primary | ICD-10-CM

## 2024-12-05 LAB
ALBUMIN SERPL-MCNC: 4.8 G/DL (ref 3.5–5.2)
ALBUMIN/GLOB SERPL: 1.8 G/DL
ALP SERPL-CCNC: 62 U/L (ref 39–117)
ALT SERPL W P-5'-P-CCNC: 43 U/L (ref 1–33)
ANION GAP SERPL CALCULATED.3IONS-SCNC: 15 MMOL/L (ref 5–15)
AST SERPL-CCNC: 38 U/L (ref 1–32)
BASOPHILS # BLD AUTO: 0.1 10*3/MM3 (ref 0–0.2)
BASOPHILS NFR BLD AUTO: 1.4 % (ref 0–1.5)
BILIRUB SERPL-MCNC: 0.5 MG/DL (ref 0–1.2)
BUN SERPL-MCNC: 6 MG/DL (ref 6–20)
BUN/CREAT SERPL: 9.1 (ref 7–25)
CALCIUM SPEC-SCNC: 9.5 MG/DL (ref 8.6–10.5)
CHLORIDE SERPL-SCNC: 103 MMOL/L (ref 98–107)
CO2 SERPL-SCNC: 22 MMOL/L (ref 22–29)
CREAT SERPL-MCNC: 0.66 MG/DL (ref 0.57–1)
DEPRECATED RDW RBC AUTO: 38.9 FL (ref 37–54)
EGFRCR SERPLBLD CKD-EPI 2021: 122.7 ML/MIN/1.73
EOSINOPHIL # BLD AUTO: 0.72 10*3/MM3 (ref 0–0.4)
EOSINOPHIL NFR BLD AUTO: 10.3 % (ref 0.3–6.2)
ERYTHROCYTE [DISTWIDTH] IN BLOOD BY AUTOMATED COUNT: 14.7 % (ref 12.3–15.4)
FLUAV RNA RESP QL NAA+PROBE: NOT DETECTED
FLUBV RNA RESP QL NAA+PROBE: NOT DETECTED
GLOBULIN UR ELPH-MCNC: 2.6 GM/DL
GLUCOSE SERPL-MCNC: 101 MG/DL (ref 65–99)
HCT VFR BLD AUTO: 46.1 % (ref 34–46.6)
HGB BLD-MCNC: 14.5 G/DL (ref 12–15.9)
IMM GRANULOCYTES # BLD AUTO: 0.02 10*3/MM3 (ref 0–0.05)
IMM GRANULOCYTES NFR BLD AUTO: 0.3 % (ref 0–0.5)
LYMPHOCYTES # BLD AUTO: 1.28 10*3/MM3 (ref 0.7–3.1)
LYMPHOCYTES NFR BLD AUTO: 18.3 % (ref 19.6–45.3)
MCH RBC QN AUTO: 23.5 PG (ref 26.6–33)
MCHC RBC AUTO-ENTMCNC: 31.5 G/DL (ref 31.5–35.7)
MCV RBC AUTO: 74.8 FL (ref 79–97)
MONOCYTES # BLD AUTO: 0.77 10*3/MM3 (ref 0.1–0.9)
MONOCYTES NFR BLD AUTO: 11 % (ref 5–12)
NEUTROPHILS NFR BLD AUTO: 4.09 10*3/MM3 (ref 1.7–7)
NEUTROPHILS NFR BLD AUTO: 58.7 % (ref 42.7–76)
NRBC BLD AUTO-RTO: 0 /100 WBC (ref 0–0.2)
NT-PROBNP SERPL-MCNC: <36 PG/ML (ref 0–450)
PLATELET # BLD AUTO: 351 10*3/MM3 (ref 140–450)
PMV BLD AUTO: 11.6 FL (ref 6–12)
POTASSIUM SERPL-SCNC: 4 MMOL/L (ref 3.5–5.2)
PROT SERPL-MCNC: 7.4 G/DL (ref 6–8.5)
QT INTERVAL: 346 MS
QTC INTERVAL: 457 MS
RBC # BLD AUTO: 6.16 10*6/MM3 (ref 3.77–5.28)
SARS-COV-2 RNA RESP QL NAA+PROBE: NOT DETECTED
SODIUM SERPL-SCNC: 140 MMOL/L (ref 136–145)
WBC NRBC COR # BLD AUTO: 6.98 10*3/MM3 (ref 3.4–10.8)

## 2024-12-05 PROCEDURE — 25010000002 EPINEPHRINE (ANAPHYLAXIS) 1 MG/ML SOLUTION: Performed by: PHYSICIAN ASSISTANT

## 2024-12-05 PROCEDURE — 96374 THER/PROPH/DIAG INJ IV PUSH: CPT

## 2024-12-05 PROCEDURE — 83880 ASSAY OF NATRIURETIC PEPTIDE: CPT | Performed by: PHYSICIAN ASSISTANT

## 2024-12-05 PROCEDURE — 99284 EMERGENCY DEPT VISIT MOD MDM: CPT

## 2024-12-05 PROCEDURE — 94640 AIRWAY INHALATION TREATMENT: CPT

## 2024-12-05 PROCEDURE — 25010000002 METHYLPREDNISOLONE PER 125 MG: Performed by: PHYSICIAN ASSISTANT

## 2024-12-05 PROCEDURE — 87636 SARSCOV2 & INF A&B AMP PRB: CPT | Performed by: PHYSICIAN ASSISTANT

## 2024-12-05 PROCEDURE — 93005 ELECTROCARDIOGRAM TRACING: CPT | Performed by: EMERGENCY MEDICINE

## 2024-12-05 PROCEDURE — 85025 COMPLETE CBC W/AUTO DIFF WBC: CPT | Performed by: PHYSICIAN ASSISTANT

## 2024-12-05 PROCEDURE — 71045 X-RAY EXAM CHEST 1 VIEW: CPT

## 2024-12-05 PROCEDURE — 80053 COMPREHEN METABOLIC PANEL: CPT | Performed by: PHYSICIAN ASSISTANT

## 2024-12-05 PROCEDURE — 96375 TX/PRO/DX INJ NEW DRUG ADDON: CPT

## 2024-12-05 RX ORDER — IPRATROPIUM BROMIDE AND ALBUTEROL SULFATE 2.5; .5 MG/3ML; MG/3ML
3 SOLUTION RESPIRATORY (INHALATION) ONCE
Status: COMPLETED | OUTPATIENT
Start: 2024-12-05 | End: 2024-12-05

## 2024-12-05 RX ORDER — METHYLPREDNISOLONE 4 MG/1
TABLET ORAL
Qty: 21 TABLET | Refills: 0 | Status: SHIPPED | OUTPATIENT
Start: 2024-12-05

## 2024-12-05 RX ORDER — EPINEPHRINE 1 MG/ML
0.3 INJECTION, SOLUTION INTRAMUSCULAR; SUBCUTANEOUS ONCE
Status: COMPLETED | OUTPATIENT
Start: 2024-12-05 | End: 2024-12-05

## 2024-12-05 RX ORDER — METHYLPREDNISOLONE SODIUM SUCCINATE 125 MG/2ML
125 INJECTION, POWDER, LYOPHILIZED, FOR SOLUTION INTRAMUSCULAR; INTRAVENOUS ONCE
Status: COMPLETED | OUTPATIENT
Start: 2024-12-05 | End: 2024-12-05

## 2024-12-05 RX ADMIN — EPINEPHRINE 0.3 MG: 1 INJECTION INTRAMUSCULAR; INTRAVENOUS; SUBCUTANEOUS at 08:45

## 2024-12-05 RX ADMIN — METHYLPREDNISOLONE SODIUM SUCCINATE 125 MG: 125 INJECTION INTRAMUSCULAR; INTRAVENOUS at 08:45

## 2024-12-05 RX ADMIN — IPRATROPIUM BROMIDE AND ALBUTEROL SULFATE 3 ML: 2.5; .5 SOLUTION RESPIRATORY (INHALATION) at 09:30

## 2024-12-05 NOTE — ED PROVIDER NOTES
Subjective   History of Present Illness  Ms. Regan is a 29-year-old female with history of asthma, GERD, ALMA and obesity who presents to the emergency department with a 2-day history of shortness of breath, cough and wheezing.  The patient states that she routinely uses inhaled steroids and albuterol neb treatments but they have been largely ineffective over the past couple of days.  She notes that she is wheezing and has a nonproductive cough.  No associated fever or chills.  No sore throat.  No rash or itching.  No known exposures.  She is a non-smoker.      Review of Systems   Constitutional:  Negative for chills and fever.   HENT:  Negative for congestion, sore throat and trouble swallowing.    Respiratory:  Positive for cough and shortness of breath.    Cardiovascular:  Negative for chest pain, palpitations and leg swelling.   Gastrointestinal:  Negative for abdominal pain, nausea and vomiting.   Genitourinary:  Negative for dysuria.   Musculoskeletal:  Negative for back pain.   Skin:  Negative for rash.   Neurological:  Negative for headaches.   Hematological: Negative.    Psychiatric/Behavioral: Negative.         Past Medical History:   Diagnosis Date    Allergic 2000    Asthma     GERD (gastroesophageal reflux disease)     Obesity 2016    Pneumonia 2017    Sleep apnea, obstructive     not diagnosed but drs have noticed during hospital stays       No Known Allergies    Past Surgical History:   Procedure Laterality Date    CHOLECYSTECTOMY         Family History   Problem Relation Age of Onset    Asthma Mother     Sleep apnea Mother     Diabetes type II Father     Coronary artery disease Maternal Grandmother     Heart attack Maternal Grandmother     Asthma Paternal Grandmother     Hypertension Paternal Grandmother        Social History     Socioeconomic History    Marital status: Single   Tobacco Use    Smoking status: Never     Passive exposure: Never    Smokeless tobacco: Never   Vaping Use    Vaping status:  Never Used   Substance and Sexual Activity    Alcohol use: No    Drug use: No    Sexual activity: Yes     Partners: Male     Birth control/protection: None           Objective   Physical Exam  Constitutional:       Appearance: She is well-developed. She is obese.   HENT:      Head: Normocephalic.      Nose: Nose normal. No congestion.      Mouth/Throat:      Mouth: Mucous membranes are moist.      Pharynx: No pharyngeal swelling or oropharyngeal exudate.   Eyes:      Pupils: Pupils are equal, round, and reactive to light.   Cardiovascular:      Rate and Rhythm: Normal rate and regular rhythm.      Pulses: Normal pulses.      Heart sounds: Normal heart sounds.   Pulmonary:      Effort: Respiratory distress (Moderate) present.      Breath sounds: Wheezing (Moderate bilateral expiratory wheezes) present.   Abdominal:      Tenderness: There is no abdominal tenderness.   Musculoskeletal:         General: No swelling or tenderness.      Cervical back: Neck supple.      Right lower leg: No edema.      Left lower leg: No edema.   Skin:     Coloration: Skin is not pale.      Findings: No rash.   Neurological:      Mental Status: She is alert and oriented to person, place, and time.   Psychiatric:         Mood and Affect: Mood normal.         Procedures           ED Course      In summary, 29-year-old female with known history of asthma, GERD, ALMA, obesity, presents to the emergency department with 2-day history of wheezing, shortness of breath and nonproductive cough.  The patient has had little relief with inhaled steroids and albuterol.  She denies any chest pain.  No fever or chills.  No sore throat or swelling.  No rash or itching.    MDM: Differential includes asthma exacerbation, viral URI, allergic component, pneumonia, CHF, etc.    Patient given albuterol/Atrovent neb treatment and epinephrine 0.3 mg subcu.  Additionally, she was given Solu-Medrol 125 mg IV.  Basic labs, EKG and chest x-ray have been ordered.  O2  sats are in the mid 90s on room air.    10:12 EST  Labs are bland.  White count is normal at 6.98.  No anemia.  Glucose is 101.  Normal creatinine at 0.66.  Normal chemistries.    proBNP is normal at less than 36.    COVID swab is negative for both COVID and influenza.    Chest x-ray shows no active disease.    EKG read by me shows sinus tachycardia with a rate of 105.  No acute ischemia noted.    On recheck, patient was sleeping and was easily awakened.  She appears in no distress.  She still has slight expiratory wheezes but they have improved from her initial presentation.  O2 sats are in the mid 90s on room air.  I spoke with her about her workup.  I will plan to discharge her home on a taper dose of steroids and have her continue her neb treatments and follow-up or return if worse.                                                 Medical Decision Making      Final diagnoses:   Moderate persistent asthma with exacerbation       ED Disposition  ED Disposition       ED Disposition   Discharge    Condition   Stable    Comment   --               Sandra Guerra APRN  [address]  [address]  [place] [address]  [phone]      As needed    [facility] EMERGENCY DEPARTMENT  [address] [id number] [phone]    If symptoms worsen         Medication List        New Prescriptions      methylPREDNISolone 4 MG dose pack  Commonly known as: MEDROL  Take as directed on package instructions.            Stop      predniSONE 10 MG tablet  Commonly known as: DELTASONE               Where to Get Your Medications        These medications were sent to [id number] - [place], [place] - [address] - [phone]  - [phone] FX  [address], [place] [address]      Phone: [phone]   methylPREDNISolone 4 MG dose pack            Connor Roach PA  12/05/24 1012

## 2024-12-05 NOTE — DISCHARGE INSTRUCTIONS
Rest.  Continue your current medications and neb treatment as previously prescribed.  Take Medrol as prescribed.  Follow-up with your PCP or return to the emergency department if symptoms persist or worsen.

## 2024-12-05 NOTE — Clinical Note
[facility] EMERGENCY DEPARTMENT  [address]  [facility] [address]  Phone: [phone]    Yoli Regan was seen and treated in our emergency department on 12/5/2024.  She may return to work on 12/09/2024.         Thank you for choosing [facility].    Babak Gill MD

## 2025-02-04 DIAGNOSIS — J45.50 SEVERE PERSISTENT ASTHMA WITHOUT COMPLICATION: ICD-10-CM

## 2025-02-04 RX ORDER — FLUTICASONE PROPIONATE AND SALMETEROL 250; 50 UG/1; UG/1
1 POWDER RESPIRATORY (INHALATION)
Qty: 60 EACH | Refills: 5 | OUTPATIENT
Start: 2025-02-04

## 2025-02-04 RX ORDER — ALBUTEROL SULFATE 90 UG/1
2 INHALANT RESPIRATORY (INHALATION) EVERY 4 HOURS PRN
Qty: 18 G | Refills: 5 | OUTPATIENT
Start: 2025-02-04

## 2025-02-04 RX ORDER — MONTELUKAST SODIUM 10 MG/1
10 TABLET ORAL NIGHTLY
Qty: 90 TABLET | Refills: 1 | OUTPATIENT
Start: 2025-02-04

## 2025-02-04 NOTE — TELEPHONE ENCOUNTER
LOV 04/27/2022  NOV Not scheduled yet    Tried to reach patient no answer left voicemail to return call    RELAY:  Patient needs an appointment for additional refills.